# Patient Record
Sex: MALE | Race: WHITE | NOT HISPANIC OR LATINO | Employment: OTHER | URBAN - METROPOLITAN AREA
[De-identification: names, ages, dates, MRNs, and addresses within clinical notes are randomized per-mention and may not be internally consistent; named-entity substitution may affect disease eponyms.]

---

## 2017-03-09 PROBLEM — E78.2 MIXED HYPERLIPIDEMIA: Status: ACTIVE | Noted: 2017-03-09

## 2018-06-07 ENCOUNTER — TELEPHONE (OUTPATIENT)
Dept: ADMINISTRATIVE | Facility: HOSPITAL | Age: 58
End: 2018-06-07

## 2019-07-12 ENCOUNTER — PATIENT OUTREACH (OUTPATIENT)
Dept: ADMINISTRATIVE | Facility: HOSPITAL | Age: 59
End: 2019-07-12

## 2020-03-23 ENCOUNTER — HOSPITAL ENCOUNTER (EMERGENCY)
Facility: HOSPITAL | Age: 60
End: 2020-03-24
Attending: SURGERY
Payer: MEDICARE
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
## 2020-03-23 DIAGNOSIS — F29 PSYCHOSIS, UNSPECIFIED PSYCHOSIS TYPE: Primary | ICD-10-CM

## 2020-03-23 LAB
25(OH)D3+25(OH)D2 SERPL-MCNC: 13 NG/ML (ref 30–96)
ALBUMIN SERPL BCP-MCNC: 4.2 G/DL (ref 3.5–5.2)
ALP SERPL-CCNC: 135 U/L (ref 55–135)
ALT SERPL W/O P-5'-P-CCNC: 32 U/L (ref 10–44)
AMPHET+METHAMPHET UR QL: NEGATIVE
ANION GAP SERPL CALC-SCNC: 17 MMOL/L (ref 8–16)
APAP SERPL-MCNC: <3 UG/ML (ref 10–20)
AST SERPL-CCNC: 29 U/L (ref 10–40)
BARBITURATES UR QL SCN>200 NG/ML: NEGATIVE
BASOPHILS # BLD AUTO: ABNORMAL K/UL (ref 0–0.2)
BASOPHILS NFR BLD: 0 % (ref 0–1.9)
BENZODIAZ UR QL SCN>200 NG/ML: NEGATIVE
BILIRUB SERPL-MCNC: 0.5 MG/DL (ref 0.1–1)
BUN SERPL-MCNC: 22 MG/DL (ref 6–20)
BZE UR QL SCN: NEGATIVE
CALCIUM SERPL-MCNC: 9.6 MG/DL (ref 8.7–10.5)
CANNABINOIDS UR QL SCN: NEGATIVE
CHLORIDE SERPL-SCNC: 99 MMOL/L (ref 95–110)
CO2 SERPL-SCNC: 18 MMOL/L (ref 23–29)
CREAT SERPL-MCNC: 1.7 MG/DL (ref 0.5–1.4)
CREAT UR-MCNC: 133.1 MG/DL (ref 23–375)
DIFFERENTIAL METHOD: ABNORMAL
EOSINOPHIL # BLD AUTO: ABNORMAL K/UL (ref 0–0.5)
EOSINOPHIL NFR BLD: 0 % (ref 0–8)
ERYTHROCYTE [DISTWIDTH] IN BLOOD BY AUTOMATED COUNT: 12.9 % (ref 11.5–14.5)
EST. GFR  (AFRICAN AMERICAN): 50 ML/MIN/1.73 M^2
EST. GFR  (NON AFRICAN AMERICAN): 43 ML/MIN/1.73 M^2
ETHANOL SERPL-MCNC: <10 MG/DL
FOLATE SERPL-MCNC: 14.3 NG/ML (ref 4–24)
GLUCOSE SERPL-MCNC: 429 MG/DL (ref 70–110)
HCT VFR BLD AUTO: 39.9 % (ref 40–54)
HGB BLD-MCNC: 14 G/DL (ref 14–18)
IMM GRANULOCYTES # BLD AUTO: ABNORMAL K/UL (ref 0–0.04)
IMM GRANULOCYTES NFR BLD AUTO: ABNORMAL % (ref 0–0.5)
LYMPHOCYTES # BLD AUTO: ABNORMAL K/UL (ref 1–4.8)
LYMPHOCYTES NFR BLD: 22 % (ref 18–48)
MCH RBC QN AUTO: 30.8 PG (ref 27–31)
MCHC RBC AUTO-ENTMCNC: 35.1 G/DL (ref 32–36)
MCV RBC AUTO: 88 FL (ref 82–98)
METHADONE UR QL SCN>300 NG/ML: NEGATIVE
MONOCYTES # BLD AUTO: ABNORMAL K/UL (ref 0.3–1)
MONOCYTES NFR BLD: 10 % (ref 4–15)
NEUTROPHILS NFR BLD: 58 % (ref 38–73)
NEUTS BAND NFR BLD MANUAL: 10 %
NRBC BLD-RTO: 0 /100 WBC
OPIATES UR QL SCN: NEGATIVE
PCP UR QL SCN>25 NG/ML: NEGATIVE
PLATELET # BLD AUTO: 197 K/UL (ref 150–350)
PLATELET BLD QL SMEAR: ABNORMAL
PMV BLD AUTO: 10.5 FL (ref 9.2–12.9)
POCT GLUCOSE: 273 MG/DL (ref 70–110)
POCT GLUCOSE: 329 MG/DL (ref 70–110)
POCT GLUCOSE: 398 MG/DL (ref 70–110)
POCT GLUCOSE: 399 MG/DL (ref 70–110)
POTASSIUM SERPL-SCNC: 4.2 MMOL/L (ref 3.5–5.1)
PROT SERPL-MCNC: 8.2 G/DL (ref 6–8.4)
RBC # BLD AUTO: 4.54 M/UL (ref 4.6–6.2)
SALICYLATES SERPL-MCNC: <5 MG/DL (ref 15–30)
SODIUM SERPL-SCNC: 134 MMOL/L (ref 136–145)
T3FREE SERPL-MCNC: 3.7 PG/ML (ref 2.3–4.2)
T4 FREE SERPL-MCNC: 1.39 NG/DL (ref 0.71–1.51)
TOXICOLOGY INFORMATION: NORMAL
TSH SERPL DL<=0.005 MIU/L-ACNC: 7.48 UIU/ML (ref 0.4–4)
VIT B12 SERPL-MCNC: 339 PG/ML (ref 210–950)
WBC # BLD AUTO: 4.91 K/UL (ref 3.9–12.7)

## 2020-03-23 PROCEDURE — 84443 ASSAY THYROID STIM HORMONE: CPT

## 2020-03-23 PROCEDURE — 96361 HYDRATE IV INFUSION ADD-ON: CPT

## 2020-03-23 PROCEDURE — 80053 COMPREHEN METABOLIC PANEL: CPT | Mod: 91

## 2020-03-23 PROCEDURE — 63600175 PHARM REV CODE 636 W HCPCS: Performed by: SURGERY

## 2020-03-23 PROCEDURE — 82306 VITAMIN D 25 HYDROXY: CPT

## 2020-03-23 PROCEDURE — 80329 ANALGESICS NON-OPIOID 1 OR 2: CPT

## 2020-03-23 PROCEDURE — 84439 ASSAY OF FREE THYROXINE: CPT

## 2020-03-23 PROCEDURE — 80053 COMPREHEN METABOLIC PANEL: CPT

## 2020-03-23 PROCEDURE — 99285 EMERGENCY DEPT VISIT HI MDM: CPT | Mod: 25

## 2020-03-23 PROCEDURE — 63600175 PHARM REV CODE 636 W HCPCS: Performed by: EMERGENCY MEDICINE

## 2020-03-23 PROCEDURE — 80307 DRUG TEST PRSMV CHEM ANLYZR: CPT

## 2020-03-23 PROCEDURE — 82746 ASSAY OF FOLIC ACID SERUM: CPT

## 2020-03-23 PROCEDURE — 82607 VITAMIN B-12: CPT

## 2020-03-23 PROCEDURE — 82962 GLUCOSE BLOOD TEST: CPT | Mod: 91

## 2020-03-23 PROCEDURE — 84481 FREE ASSAY (FT-3): CPT

## 2020-03-23 PROCEDURE — 85027 COMPLETE CBC AUTOMATED: CPT

## 2020-03-23 PROCEDURE — 80320 DRUG SCREEN QUANTALCOHOLS: CPT

## 2020-03-23 PROCEDURE — 96372 THER/PROPH/DIAG INJ SC/IM: CPT | Mod: 59

## 2020-03-23 PROCEDURE — 85007 BL SMEAR W/DIFF WBC COUNT: CPT

## 2020-03-23 PROCEDURE — 96374 THER/PROPH/DIAG INJ IV PUSH: CPT

## 2020-03-23 PROCEDURE — 36415 COLL VENOUS BLD VENIPUNCTURE: CPT

## 2020-03-23 RX ORDER — LORAZEPAM 2 MG/ML
2 INJECTION INTRAMUSCULAR EVERY 4 HOURS PRN
Status: DISCONTINUED | OUTPATIENT
Start: 2020-03-23 | End: 2020-03-24 | Stop reason: HOSPADM

## 2020-03-23 RX ORDER — SODIUM CHLORIDE 9 MG/ML
1000 INJECTION, SOLUTION INTRAVENOUS
Status: COMPLETED | OUTPATIENT
Start: 2020-03-23 | End: 2020-03-23

## 2020-03-23 RX ORDER — INSULIN ASPART 100 [IU]/ML
6 INJECTION, SOLUTION INTRAVENOUS; SUBCUTANEOUS
Status: COMPLETED | OUTPATIENT
Start: 2020-03-23 | End: 2020-03-23

## 2020-03-23 RX ORDER — HALOPERIDOL 5 MG/ML
5 INJECTION INTRAMUSCULAR EVERY 4 HOURS PRN
Status: DISCONTINUED | OUTPATIENT
Start: 2020-03-23 | End: 2020-03-24 | Stop reason: HOSPADM

## 2020-03-23 RX ORDER — DIPHENHYDRAMINE HYDROCHLORIDE 50 MG/ML
50 INJECTION INTRAMUSCULAR; INTRAVENOUS EVERY 4 HOURS PRN
Status: DISCONTINUED | OUTPATIENT
Start: 2020-03-23 | End: 2020-03-24 | Stop reason: HOSPADM

## 2020-03-23 RX ORDER — INSULIN ASPART 100 [IU]/ML
6 INJECTION, SUSPENSION SUBCUTANEOUS
Status: DISCONTINUED | OUTPATIENT
Start: 2020-03-23 | End: 2020-03-23

## 2020-03-23 RX ADMIN — LORAZEPAM 2 MG: 2 INJECTION INTRAMUSCULAR; INTRAVENOUS at 04:03

## 2020-03-23 RX ADMIN — INSULIN ASPART 6 UNITS: 100 INJECTION, SOLUTION INTRAVENOUS; SUBCUTANEOUS at 05:03

## 2020-03-23 RX ADMIN — SODIUM CHLORIDE 1000 ML: 0.9 INJECTION, SOLUTION INTRAVENOUS at 10:03

## 2020-03-23 RX ADMIN — DIPHENHYDRAMINE HYDROCHLORIDE 50 MG: 50 INJECTION INTRAMUSCULAR; INTRAVENOUS at 04:03

## 2020-03-23 RX ADMIN — INSULIN HUMAN 4 UNITS: 100 INJECTION, SOLUTION PARENTERAL at 07:03

## 2020-03-23 RX ADMIN — HALOPERIDOL LACTATE 5 MG: 5 INJECTION, SOLUTION INTRAMUSCULAR at 04:03

## 2020-03-23 NOTE — ED TRIAGE NOTES
Patient presents to the ER via EMS for psychiatric evaluation.  Patient is seen by [facility].  Patient has history of bipolar and schizophrenia.  Patient is manic at time of arrival, shouting out random words.  It is reported by EMS that patient is having visual hallucinations, pointing to people who are not there.  Patient unable to answer any questions himself due to manic state.

## 2020-03-23 NOTE — ED PROVIDER NOTES
[facility]                                                 Chief Complaint  59 y.o. male with Psychiatric Evaluation    History of Present Illness  Chau Lovett presents to the emergency room with hallucinations  Patient has long history of bipolar disorder, last hospitalization was January  Patient is seeing things and hearing voices, family called 911 this afternoon  Patient initially was paranoid per EMS, has calmed down considerably since  Patient is nonviolent, not suicidal but obviously delusional and paranoid today    The history is provided by the patient   device was not used during this ER visit  Medical history:  Bipolar, bradycardia, diabetes, HTN, nuclear sclerosis  Surgeries: Cholecystectomy and pacemaker  No known allergies    I have reviewed all of this patient's past medical, surgical, family, and social   histories as well as active allergies and medications documented in the  electronic medical record    Review of Systems and Physical Exam      Review of Systems  -- Constitution - no fever, denies fatigue, no weakness, no chills  -- Eyes - no tearing or redness, no visual disturbance  -- Ear, Nose - no tinnitus or earache, no nasal congestion or discharge  -- Mouth,Throat - no sore throat, no toothache, normal voice, normal swallowing  -- Respiratory - denies cough and congestion, no shortness of breath, no REES  -- Cardiovascular - denies chest pain, no palpitations, denies claudication  -- Gastrointestinal - denies abdominal pain, nausea, vomiting, or diarrhea  -- Genitourinary - no dysuria, denies flank pain, no hematuria, no STD risk  -- Musculoskeletal - denies back pain, negative for trauma or injury   -- Neurological - no headache, denies weakness or seizure; no LOC  -- Skin - denies pallor, rash, or changes in skin. no hives or welts noted  -- Psychiatric - psychosis with fracture thought process, not suicidal    Physical Exam  -- Nursing  note and vitals reviewed  -- Constitutional: Appears well-developed and well-nourished  -- Head: Atraumatic. Normocephalic. No obvious abnormality  -- Eyes: Pupils are equal and reactive to light. Normal conjunctiva and lids  -- Cardiac: Normal rate, regular rhythm and normal heart sounds  -- Pulmonary: Normal respiratory effort, breath sounds clear to auscultation  -- Abdominal: Soft, no tenderness. Normal bowel sounds. Normal liver edge  -- Musculoskeletal: Normal range of motion, no effusions. Joints stable   -- Neurological: No focal deficits. Showed good interaction with staff  -- Vascular: Posterior tibial, dorsalis pedis and radial pulses 2+ bilaterally    -- Lymphatics: No cervical or peripheral lymphadenopathy. No edema noted  -- Skin: Warm and dry. No evidence of rash or cellulitis    Emergency Room Course      Diagnosis  -- The encounter diagnosis was Psychosis, unspecified psychosis type.    Disposition and Plan  -- Disposition: PEC  -- Condition: stable  -- Pt will be placed in a psychiatric facility  -- The patient is a direct observation until placement  -- The patient has been made aware of his or her rights while under [facility]EC in the ER  -- All questions have been answered; will follow ER protocols until placement    Lab work was performed in the ER, cleared for psychiatric placement     This note is dictated on Dragon Natural Speaking word recognition program.  There are word recognition mistakes that are occasionally missed on review.          Kei Bonds MD  03/23/20 4232

## 2020-03-23 NOTE — ED NOTES
Pt occupied to bathroom to void and give urine sample and change in blue paper scrubs.  Pt co-operated well.

## 2020-03-24 ENCOUNTER — HOSPITAL ENCOUNTER (INPATIENT)
Facility: HOSPITAL | Age: 60
LOS: 15 days | Discharge: HOME | DRG: 885 | End: 2020-04-08
Attending: PSYCHIATRY & NEUROLOGY | Admitting: PSYCHIATRY & NEUROLOGY
Payer: MEDICARE

## 2020-03-24 VITALS
OXYGEN SATURATION: 100 % | TEMPERATURE: 97 F | SYSTOLIC BLOOD PRESSURE: 128 MMHG | HEIGHT: 70 IN | WEIGHT: 200 LBS | HEART RATE: 84 BPM | DIASTOLIC BLOOD PRESSURE: 62 MMHG | BODY MASS INDEX: 28.63 KG/M2 | RESPIRATION RATE: 18 BRPM

## 2020-03-24 DIAGNOSIS — N28.9 ACUTE RENAL INSUFFICIENCY: ICD-10-CM

## 2020-03-24 DIAGNOSIS — E78.2 MIXED HYPERLIPIDEMIA: ICD-10-CM

## 2020-03-24 DIAGNOSIS — F31.2 BIPOLAR AFFECTIVE DISORDER, MANIC, SEVERE, WITH PSYCHOTIC BEHAVIOR: ICD-10-CM

## 2020-03-24 DIAGNOSIS — E11.65 UNCONTROLLED TYPE 2 DIABETES MELLITUS WITH HYPERGLYCEMIA, WITHOUT LONG-TERM CURRENT USE OF INSULIN: ICD-10-CM

## 2020-03-24 DIAGNOSIS — R79.89 ABNORMAL TSH: ICD-10-CM

## 2020-03-24 DIAGNOSIS — R00.1 BRADYCARDIA: ICD-10-CM

## 2020-03-24 DIAGNOSIS — I10 ESSENTIAL HYPERTENSION: ICD-10-CM

## 2020-03-24 DIAGNOSIS — F31.2 BIPOLAR I DISORDER, CURRENT OR MOST RECENT EPISODE MANIC, SEVERE WITH MOOD-CONGRUENT PSYCHOTIC FEATURES: Primary | ICD-10-CM

## 2020-03-24 DIAGNOSIS — E87.1 HYPONATREMIA: ICD-10-CM

## 2020-03-24 LAB
ALBUMIN SERPL BCP-MCNC: 3.5 G/DL (ref 3.5–5.2)
ALP SERPL-CCNC: 97 U/L (ref 55–135)
ALT SERPL W/O P-5'-P-CCNC: 28 U/L (ref 10–44)
ANION GAP SERPL CALC-SCNC: 10 MMOL/L (ref 8–16)
ANION GAP SERPL CALC-SCNC: 11 MMOL/L (ref 8–16)
AST SERPL-CCNC: 34 U/L (ref 10–40)
BILIRUB SERPL-MCNC: 0.1 MG/DL (ref 0.1–1)
BUN SERPL-MCNC: 24 MG/DL (ref 6–20)
BUN SERPL-MCNC: 26 MG/DL (ref 6–20)
CALCIUM SERPL-MCNC: 8 MG/DL (ref 8.7–10.5)
CALCIUM SERPL-MCNC: 8.4 MG/DL (ref 8.7–10.5)
CHLORIDE SERPL-SCNC: 98 MMOL/L (ref 95–110)
CHLORIDE SERPL-SCNC: 99 MMOL/L (ref 95–110)
CO2 SERPL-SCNC: 22 MMOL/L (ref 23–29)
CO2 SERPL-SCNC: 24 MMOL/L (ref 23–29)
CREAT SERPL-MCNC: 1.3 MG/DL (ref 0.5–1.4)
CREAT SERPL-MCNC: 1.4 MG/DL (ref 0.5–1.4)
EST. GFR  (AFRICAN AMERICAN): >60 ML/MIN/1.73 M^2
EST. GFR  (AFRICAN AMERICAN): >60 ML/MIN/1.73 M^2
EST. GFR  (NON AFRICAN AMERICAN): 55 ML/MIN/1.73 M^2
EST. GFR  (NON AFRICAN AMERICAN): 60 ML/MIN/1.73 M^2
ESTIMATED AVG GLUCOSE: 295 MG/DL (ref 68–131)
GLUCOSE SERPL-MCNC: 255 MG/DL (ref 70–110)
GLUCOSE SERPL-MCNC: 262 MG/DL (ref 70–110)
HBA1C MFR BLD HPLC: 11.9 % (ref 4–5.6)
POCT GLUCOSE: 219 MG/DL (ref 70–110)
POCT GLUCOSE: 248 MG/DL (ref 70–110)
POCT GLUCOSE: 267 MG/DL (ref 70–110)
POCT GLUCOSE: 267 MG/DL (ref 70–110)
POTASSIUM SERPL-SCNC: 3.8 MMOL/L (ref 3.5–5.1)
POTASSIUM SERPL-SCNC: 4.6 MMOL/L (ref 3.5–5.1)
PROT SERPL-MCNC: 6.7 G/DL (ref 6–8.4)
SODIUM SERPL-SCNC: 132 MMOL/L (ref 136–145)
SODIUM SERPL-SCNC: 132 MMOL/L (ref 136–145)

## 2020-03-24 PROCEDURE — 83036 HEMOGLOBIN GLYCOSYLATED A1C: CPT

## 2020-03-24 PROCEDURE — 90833 PR PSYCHOTHERAPY W/PATIENT W/E&M, 30 MIN (ADD ON): ICD-10-PCS | Mod: S$PBB,,, | Performed by: PSYCHIATRY & NEUROLOGY

## 2020-03-24 PROCEDURE — 90833 PSYTX W PT W E/M 30 MIN: CPT | Mod: S$PBB,,, | Performed by: PSYCHIATRY & NEUROLOGY

## 2020-03-24 PROCEDURE — 80048 BASIC METABOLIC PNL TOTAL CA: CPT

## 2020-03-24 PROCEDURE — 63600175 PHARM REV CODE 636 W HCPCS: Performed by: PSYCHIATRY & NEUROLOGY

## 2020-03-24 PROCEDURE — 99222 1ST HOSP IP/OBS MODERATE 55: CPT | Mod: ,,, | Performed by: NURSE PRACTITIONER

## 2020-03-24 PROCEDURE — 11400000 HC PSYCH PRIVATE ROOM

## 2020-03-24 PROCEDURE — 25000003 PHARM REV CODE 250: Performed by: PSYCHIATRY & NEUROLOGY

## 2020-03-24 PROCEDURE — 99222 PR INITIAL HOSPITAL CARE,LEVL II: ICD-10-PCS | Mod: ,,, | Performed by: NURSE PRACTITIONER

## 2020-03-24 PROCEDURE — 99223 PR INITIAL HOSPITAL CARE,LEVL III: ICD-10-PCS | Mod: AI,S$PBB,, | Performed by: PSYCHIATRY & NEUROLOGY

## 2020-03-24 PROCEDURE — 36415 COLL VENOUS BLD VENIPUNCTURE: CPT

## 2020-03-24 PROCEDURE — 25000003 PHARM REV CODE 250: Performed by: NURSE PRACTITIONER

## 2020-03-24 PROCEDURE — 99223 1ST HOSP IP/OBS HIGH 75: CPT | Mod: AI,S$PBB,, | Performed by: PSYCHIATRY & NEUROLOGY

## 2020-03-24 RX ORDER — FOLIC ACID 1 MG/1
1 TABLET ORAL DAILY
Status: DISCONTINUED | OUTPATIENT
Start: 2020-03-24 | End: 2020-03-25

## 2020-03-24 RX ORDER — CARBAMAZEPINE 100 MG/1
100 TABLET, EXTENDED RELEASE ORAL 2 TIMES DAILY
Status: DISCONTINUED | OUTPATIENT
Start: 2020-03-24 | End: 2020-03-26

## 2020-03-24 RX ORDER — IBUPROFEN 200 MG
24 TABLET ORAL
Status: DISCONTINUED | OUTPATIENT
Start: 2020-03-24 | End: 2020-04-08 | Stop reason: HOSPADM

## 2020-03-24 RX ORDER — ASPIRIN 81 MG/1
81 TABLET ORAL DAILY
Status: DISCONTINUED | OUTPATIENT
Start: 2020-03-24 | End: 2020-04-08 | Stop reason: HOSPADM

## 2020-03-24 RX ORDER — LOPERAMIDE HYDROCHLORIDE 2 MG/1
2 CAPSULE ORAL
Status: DISCONTINUED | OUTPATIENT
Start: 2020-03-24 | End: 2020-04-08 | Stop reason: HOSPADM

## 2020-03-24 RX ORDER — METFORMIN HYDROCHLORIDE 500 MG/1
1000 TABLET ORAL 2 TIMES DAILY WITH MEALS
Status: DISCONTINUED | OUTPATIENT
Start: 2020-03-24 | End: 2020-04-08 | Stop reason: HOSPADM

## 2020-03-24 RX ORDER — OLANZAPINE 10 MG/1
10 TABLET ORAL EVERY 4 HOURS PRN
Status: DISCONTINUED | OUTPATIENT
Start: 2020-03-24 | End: 2020-04-08 | Stop reason: HOSPADM

## 2020-03-24 RX ORDER — HYDROXYZINE PAMOATE 50 MG/1
50 CAPSULE ORAL EVERY 6 HOURS PRN
Status: DISCONTINUED | OUTPATIENT
Start: 2020-03-24 | End: 2020-04-08 | Stop reason: HOSPADM

## 2020-03-24 RX ORDER — GLUCAGON 1 MG
1 KIT INJECTION
Status: DISCONTINUED | OUTPATIENT
Start: 2020-03-24 | End: 2020-04-08 | Stop reason: HOSPADM

## 2020-03-24 RX ORDER — DOCUSATE SODIUM 100 MG/1
100 CAPSULE, LIQUID FILLED ORAL DAILY PRN
Status: DISCONTINUED | OUTPATIENT
Start: 2020-03-24 | End: 2020-04-08 | Stop reason: HOSPADM

## 2020-03-24 RX ORDER — ACETAMINOPHEN 325 MG/1
650 TABLET ORAL EVERY 6 HOURS PRN
Status: DISCONTINUED | OUTPATIENT
Start: 2020-03-24 | End: 2020-04-08 | Stop reason: HOSPADM

## 2020-03-24 RX ORDER — SIMVASTATIN 10 MG/1
20 TABLET, FILM COATED ORAL NIGHTLY
Status: DISCONTINUED | OUTPATIENT
Start: 2020-03-24 | End: 2020-04-08 | Stop reason: HOSPADM

## 2020-03-24 RX ORDER — MAG HYDROX/ALUMINUM HYD/SIMETH 200-200-20
30 SUSPENSION, ORAL (FINAL DOSE FORM) ORAL EVERY 6 HOURS PRN
Status: DISCONTINUED | OUTPATIENT
Start: 2020-03-24 | End: 2020-04-08 | Stop reason: HOSPADM

## 2020-03-24 RX ORDER — IBUPROFEN 200 MG
16 TABLET ORAL
Status: DISCONTINUED | OUTPATIENT
Start: 2020-03-24 | End: 2020-04-08 | Stop reason: HOSPADM

## 2020-03-24 RX ORDER — OLANZAPINE 5 MG/1
5 TABLET ORAL NIGHTLY
Status: DISCONTINUED | OUTPATIENT
Start: 2020-03-24 | End: 2020-03-25

## 2020-03-24 RX ORDER — OLANZAPINE 10 MG/2ML
10 INJECTION, POWDER, FOR SOLUTION INTRAMUSCULAR EVERY 4 HOURS PRN
Status: DISCONTINUED | OUTPATIENT
Start: 2020-03-24 | End: 2020-04-08 | Stop reason: HOSPADM

## 2020-03-24 RX ORDER — SAXAGLIPTIN 2.5 MG/1
5 TABLET, FILM COATED ORAL DAILY
Status: DISCONTINUED | OUTPATIENT
Start: 2020-03-24 | End: 2020-04-08 | Stop reason: HOSPADM

## 2020-03-24 RX ORDER — INSULIN ASPART 100 [IU]/ML
0-5 INJECTION, SOLUTION INTRAVENOUS; SUBCUTANEOUS
Status: DISCONTINUED | OUTPATIENT
Start: 2020-03-24 | End: 2020-04-08 | Stop reason: HOSPADM

## 2020-03-24 RX ADMIN — SAXAGLIPTIN 5 MG: 2.5 TABLET, FILM COATED ORAL at 08:03

## 2020-03-24 RX ADMIN — INSULIN ASPART 3 UNITS: 100 INJECTION, SOLUTION INTRAVENOUS; SUBCUTANEOUS at 12:03

## 2020-03-24 RX ADMIN — ASPIRIN 81 MG: 81 TABLET, COATED ORAL at 08:03

## 2020-03-24 RX ADMIN — CARBAMAZEPINE 100 MG: 100 TABLET, EXTENDED RELEASE ORAL at 08:03

## 2020-03-24 RX ADMIN — FOLIC ACID 1 MG: 1 TABLET ORAL at 08:03

## 2020-03-24 RX ADMIN — THERA TABS 1 TABLET: TAB at 08:03

## 2020-03-24 RX ADMIN — METFORMIN HYDROCHLORIDE 1000 MG: 500 TABLET ORAL at 08:03

## 2020-03-24 RX ADMIN — SIMVASTATIN 20 MG: 10 TABLET, FILM COATED ORAL at 08:03

## 2020-03-24 RX ADMIN — OLANZAPINE 5 MG: 5 TABLET, FILM COATED ORAL at 08:03

## 2020-03-24 RX ADMIN — INSULIN ASPART 3 UNITS: 100 INJECTION, SOLUTION INTRAVENOUS; SUBCUTANEOUS at 04:03

## 2020-03-24 RX ADMIN — INSULIN ASPART 1 UNITS: 100 INJECTION, SOLUTION INTRAVENOUS; SUBCUTANEOUS at 08:03

## 2020-03-24 RX ADMIN — METFORMIN HYDROCHLORIDE 1000 MG: 500 TABLET ORAL at 04:03

## 2020-03-24 RX ADMIN — CARBAMAZEPINE 100 MG: 100 TABLET, EXTENDED RELEASE ORAL at 10:03

## 2020-03-24 NOTE — PLAN OF CARE
Pt awake in assigned bed at this time.  Pt awakened by disruptive behavior of female peer.  Pt slept 2 hours.  Pathways clear and bed is low.  Q 15 minute safety checks ongoing.  All precautions maintained.

## 2020-03-24 NOTE — ASSESSMENT & PLAN NOTE
"Should be on ace/arb due to DM diagnosis. Has lisinopril 5mg on med list. Bp running on low side and patient reports that he was on something for bp but was taken off because "it leveled out". I will not start Ace/arb inpatient unless bp rises. Should f/u oupt to have this added to medications for renal protection     "

## 2020-03-24 NOTE — PLAN OF CARE
Problem: Adult Behavioral Health Plan of Care  Goal: Optimized Coping Skills in Response to Life Stressors  Intervention: Promote Effective Coping Strategies  Flowsheets (Taken 3/24/2020 1513)  Supportive Measures: active listening utilized; counseling provided; positive reinforcement provided; verbalization of feelings encouraged; problem solving facilitated; decision-making supported; relaxation techniques promoted; goal setting facilitated; self-care encouraged; self-reflection promoted; self-responsibility promoted; journaling promoted     Behavior: Patient attended psychotherapeutic group dressed in hospital scrubs, appropriate grooming with congruent affect and elevated mood, hypervigilant posture, and persistent eye contact. Patient remained engaged and attentive.    Intervention:  will engage patients in a CBT based process group focused on the cycle of anxiety: anxiety, avoidance, temporary relief, long term anxiety. This will allow patients to be introspective about their coping skills when they are anxious, as well as identify the behaviors they are exhibiting when anxious. Patients will be encouraged to express concerns and goals for treatment and discharge, as well as perceived barriers to progress.  will aid patients in prioritizing and making realistic goals, identifying strengths that will aid in transition, and verbalizing emotions.     Response: Patient remained attentive and engaged in conversation. He is grandiose and repeats that he never gives up. He has minimal insight at this time. He refers to himself as a perfectionist. He was smiling throughout group.    Plan:  will continue to encourage patient to explore and verbalize emotions, set goals to aid in preventing re-hospitalization, attend psychotherapeutic group, and follow up with aftercare appointments.
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
## 2020-03-24 NOTE — PROGRESS NOTES
"   03/24/20 1040   [facility] Group Therapy   Group Name Therapeutic Recreation   Specific Interventions Cognitive Stimulation Training   Participation Level Appropriate   Participation Quality Cooperative   Insight/Motivation Good   Affect/Mood Display Appropriate   Cognition Alert   Psychomotor WNL   Patient reports "good" mood. Patient states "here I get total relaxation, at home it's to much pressure, people telling me what to do when I think it's wrong. When I'm high I think I can touch the jose guadalupe. It's my bipolar." Patient attention seeking, talking while others talked, smiling and staring.  "

## 2020-03-24 NOTE — ASSESSMENT & PLAN NOTE
Amaryl, metformin and trulicity on home med list. A1C pending. Bs 255 this am.   Start DM diet. SSI as well as resume metformin while here hold amaryl

## 2020-03-24 NOTE — SUBJECTIVE & OBJECTIVE
Past Medical History:   Diagnosis Date    Bipolar 1 disorder     Bradycardia     Diabetes mellitus     History of psychiatric hospitalization     Hx of psychiatric care     Hypertension     Jacinta     Nuclear sclerosis of both eyes 10/22/2015    Psychiatric problem     Therapy        Past Surgical History:   Procedure Laterality Date    GALLBLADDER SURGERY      INSERT / REPLACE / REMOVE PACEMAKER         Review of patient's allergies indicates:  No Known Allergies    Current Facility-Administered Medications on File Prior to Encounter   Medication    [COMPLETED] 0.9%  NaCl infusion    [COMPLETED] insulin aspart U-100 pen 6 Units    [COMPLETED] insulin regular injection 4 Units    [DISCONTINUED] diphenhydrAMINE injection 50 mg    [DISCONTINUED] haloperidol lactate injection 5 mg    [DISCONTINUED] insulin aspart protamine-insulin aspart (NovoLOG 70/30) injection    [DISCONTINUED] lorazepam injection 2 mg     Current Outpatient Medications on File Prior to Encounter   Medication Sig    blood sugar diagnostic (BLOOD GLUCOSE TEST) Strp 1 strip once daily.    cholecalciferol, vitamin D3, (VITAMIN D3) 2,000 unit Cap Take 1 capsule by mouth once daily.    metFORMIN (GLUCOPHAGE) 1000 MG tablet Take 1 tablet (1,000 mg total) by mouth 2 (two) times daily with meals.    SAXagliptin (ONGLYZA) 5 mg Tab tablet Take 1 tablet (5 mg total) by mouth once daily.    aspirin (ECOTRIN) 81 MG EC tablet Take 81 mg by mouth once daily.    diphenhydrAMINE (BENADRYL) 25 mg capsule Take 25 mg by mouth every evening. Take three tablets (75mg total) nightly    divalproex (DEPAKOTE ER) 500 MG Tb24 Take 500 mg by mouth nightly.     fish oil-omega-3 fatty acids 300-1,000 mg capsule Take 2 g by mouth once daily.    glimepiride (AMARYL) 2 MG tablet Take 1 tablet (2 mg total) by mouth 2 (two) times daily.    lancets (TRUEPLUS LANCETS) 28 gauge Misc 1 lancet by Misc.(Non-Drug; Combo Route) route 3 (three) times daily.     lisinopril (PRINIVIL,ZESTRIL) 5 MG tablet TAKE 1 TABLET BY MOUTH EVERY DAY    multivitamin with minerals tablet Take 1 tablet by mouth once daily.    risperidone (RISPERDAL) 3 MG Tab Take 4 mg by mouth nightly.     simvastatin (ZOCOR) 20 MG tablet Take 1 tablet (20 mg total) by mouth every evening.    TRUEPLUS LANCETS 28 gauge Misc USE AS DIRECTED     Family History     Problem Relation (Age of Onset)    Bipolar disorder Sister    Heart attack Mother, Father        Tobacco Use    Smoking status: Never Smoker    Smokeless tobacco: Never Used   Substance and Sexual Activity    Alcohol use: No     Alcohol/week: 0.0 standard drinks    Drug use: No    Sexual activity: Never     Review of Systems   Constitutional: Negative for chills and fever.   Respiratory: Negative for chest tightness and shortness of breath.    Cardiovascular: Negative for chest pain and palpitations.   Gastrointestinal: Negative for abdominal distention, abdominal pain, blood in stool and vomiting.   Genitourinary: Negative for dysuria, flank pain, hematuria and urgency.   Musculoskeletal: Negative for back pain and neck pain.   Skin: Negative for rash and wound.   Neurological: Negative for dizziness, weakness and numbness.   Hematological: Does not bruise/bleed easily.   Psychiatric/Behavioral: Positive for self-injury. Negative for agitation and suicidal ideas. The patient is not nervous/anxious.      Objective:     Vital Signs (Most Recent):  Temp: 96.9 °F (36.1 °C) (03/24/20 0045)  Pulse: 94 (03/24/20 0045)  Resp: 18 (03/24/20 0045)  BP: 130/68 (03/24/20 0045) Vital Signs (24h Range):  Temp:  [96.4 °F (35.8 °C)-97.6 °F (36.4 °C)] 96.9 °F (36.1 °C)  Pulse:  [] 94  Resp:  [16-20] 18  SpO2:  [98 %-100 %] 100 %  BP: (114-134)/(58-83) 130/68     Weight: 92 kg (202 lb 11.4 oz)  Body mass index is 29.09 kg/m².    Physical Exam   Constitutional: He is oriented to person, place, and time. He appears well-developed and well-nourished.    HENT:   Head: Normocephalic and atraumatic.   Neck: Normal range of motion. Neck supple. No thyromegaly present.   Cardiovascular: Normal rate, regular rhythm, normal heart sounds and intact distal pulses.   No murmur heard.  Pulmonary/Chest: Effort normal and breath sounds normal. No respiratory distress. He has no wheezes. He has no rales.   Abdominal: Soft. Bowel sounds are normal. He exhibits no distension. There is no tenderness.   Musculoskeletal: Normal range of motion. He exhibits no edema or deformity.   Neurological: He is alert and oriented to person, place, and time.   Neuro: Cranial nerves:  CN II Visual fields full to confrontation.   CN III, IV, VI Pupils are equal, round, and reactive to light.  CN III: no palsy  Nystagmus: none   Diplopia: none  Ophthalmoparesis: none  CN V Facial sensation intact.   CN VII Facial expression full, symmetric.   CN VIII normal.   CN IX normal.   CN X normal.   CN XI normal.   CN XII normal.     Skin: Skin is warm and dry.   Very superficial scrapes scabbed to right knee. No s/s infection    No bruising to right rib cage, just tender to palp   Psychiatric: He has a normal mood and affect. His behavior is normal. Thought content normal.   Nursing note and vitals reviewed.      Significant Labs:     U/A- ordered  UDS  Results for orders placed or performed during the hospital encounter of 03/23/20   Drug screen panel, emergency   Result Value Ref Range    Benzodiazepines Negative     Methadone metabolites Negative     Cocaine (Metab.) Negative     Opiate Scrn, Ur Negative     Barbiturate Screen, Ur Negative     Amphetamine Screen, Ur Negative     THC Negative     Phencyclidine Negative     Creatinine, Random Ur 133.1 23.0 - 375.0 mg/dL    Toxicology Information SEE COMMENT      CBC  Results for orders placed or performed during the hospital encounter of 03/23/20   CBC auto differential   Result Value Ref Range    WBC 4.91 3.90 - 12.70 K/uL    RBC 4.54 (L) 4.60 - 6.20  M/uL    Hemoglobin 14.0 14.0 - 18.0 g/dL    Hematocrit 39.9 (L) 40.0 - 54.0 %    Mean Corpuscular Volume 88 82 - 98 fL    Mean Corpuscular Hemoglobin 30.8 27.0 - 31.0 pg    Mean Corpuscular Hemoglobin Conc 35.1 32.0 - 36.0 g/dL    RDW 12.9 11.5 - 14.5 %    Platelets 197 150 - 350 K/uL    MPV 10.5 9.2 - 12.9 fL    Immature Granulocytes CANCELED 0.0 - 0.5 %    Immature Grans (Abs) CANCELED 0.00 - 0.04 K/uL    Lymph # CANCELED 1.0 - 4.8 K/uL    Mono # CANCELED 0.3 - 1.0 K/uL    Eos # CANCELED 0.0 - 0.5 K/uL    Baso # CANCELED 0.00 - 0.20 K/uL    nRBC 0 0 /100 WBC    Gran% 58.0 38.0 - 73.0 %    Lymph% 22.0 18.0 - 48.0 %    Mono% 10.0 4.0 - 15.0 %    Eosinophil% 0.0 0.0 - 8.0 %    Basophil% 0.0 0.0 - 1.9 %    Bands 10.0 %    Platelet Estimate Appears normal     Differential Method Manual      CMP  Results for orders placed or performed during the hospital encounter of 03/23/20   Comprehensive metabolic panel   Result Value Ref Range    Sodium 132 (L) 136 - 145 mmol/L    Potassium 3.8 3.5 - 5.1 mmol/L    Chloride 99 95 - 110 mmol/L    CO2 22 (L) 23 - 29 mmol/L    Glucose 262 (H) 70 - 110 mg/dL    BUN, Bld 26 (H) 6 - 20 mg/dL    Creatinine 1.4 0.5 - 1.4 mg/dL    Calcium 8.0 (L) 8.7 - 10.5 mg/dL    Total Protein 6.7 6.0 - 8.4 g/dL    Albumin 3.5 3.5 - 5.2 g/dL    Total Bilirubin 0.1 0.1 - 1.0 mg/dL    Alkaline Phosphatase 97 55 - 135 U/L    AST 34 10 - 40 U/L    ALT 28 10 - 44 U/L    Anion Gap 11 8 - 16 mmol/L    eGFR if African American >60 >60 mL/min/1.73 m^2    eGFR if non African American 55 (A) >60 mL/min/1.73 m^2     TSH  Results for orders placed or performed during the hospital encounter of 03/23/20   TSH   Result Value Ref Range    TSH 7.479 (H) 0.400 - 4.000 uIU/mL   free t4 1.39  ETOH  Results for orders placed or performed during the hospital encounter of 03/23/20   Ethanol   Result Value Ref Range    Alcohol, Medical, Serum <10 <10 mg/dL     Salicylate  Results for orders placed or performed during the hospital  encounter of 03/23/20   Salicylate level   Result Value Ref Range    Salicylate Lvl <5.0 (L) 15.0 - 30.0 mg/dL     Acetaminophen  Results for orders placed or performed during the hospital encounter of 03/23/20   Acetaminophen level   Result Value Ref Range    Acetaminophen (Tylenol), Serum <3.0 (L) 10.0 - 20.0 ug/mL

## 2020-03-24 NOTE — PLAN OF CARE
Recommendation:  1. Continue Diabetic diet as ordered;  2. encourage pt to increase intake of meals    Goals: increase intake of meals to 75% by f/u  Nutrition Goal Status: new  Nutrition Discharge Planning: Diabetic/low sodium

## 2020-03-24 NOTE — CONSULTS
[facility] Hospital Medicine  Consult Note    Patient Name: Chau Lovett  MRN: [id number]  Admission Date: 3/24/2020  Hospital Length of Stay: 0 days  Attending Physician: Bao Naranjo MD   Primary Care Provider: Primary Doctor No           Patient information was obtained from patient and ER records.     Inpatient consult to [facility] for History and Physical  Consult performed by: Tiarra Ahn NP  Consult ordered by: Bao Naranjo MD        Subjective:     Principal Problem: <principal problem not specified>    Chief Complaint: No chief complaint on file.       HPI: 60 yo male patient that presented to ER with EMS delusional. Hx of bipolar. He was admitted to [facility] and medicine was consulted for H/p. He also has hx of DM, HTN and bradycardia. He report sthat his only complaint is bruised ribs from recent trip and fall. Xray without rib fractures from ER. No SOB or difficulty breathing. % on RA. Other VSS/labs reviewed.     Past Medical History:   Diagnosis Date    Bipolar 1 disorder     Bradycardia     Diabetes mellitus     History of psychiatric hospitalization     Hx of psychiatric care     Hypertension     Jacinta     Nuclear sclerosis of both eyes 10/22/2015    Psychiatric problem     Therapy        Past Surgical History:   Procedure Laterality Date    GALLBLADDER SURGERY      INSERT / REPLACE / REMOVE PACEMAKER         Review of patient's allergies indicates:  No Known Allergies    Current Facility-Administered Medications on File Prior to Encounter   Medication    [COMPLETED] 0.9%  NaCl infusion    [COMPLETED] insulin aspart U-100 pen 6 Units    [COMPLETED] insulin regular injection 4 Units    [DISCONTINUED] diphenhydrAMINE injection 50 mg    [DISCONTINUED] haloperidol lactate injection 5 mg    [DISCONTINUED] insulin aspart protamine-insulin aspart (NovoLOG 70/30) injection    [DISCONTINUED] lorazepam injection 2 mg     Current  Outpatient Medications on File Prior to Encounter   Medication Sig    blood sugar diagnostic (BLOOD GLUCOSE TEST) Strp 1 strip once daily.    cholecalciferol, vitamin D3, (VITAMIN D3) 2,000 unit Cap Take 1 capsule by mouth once daily.    metFORMIN (GLUCOPHAGE) 1000 MG tablet Take 1 tablet (1,000 mg total) by mouth 2 (two) times daily with meals.    SAXagliptin (ONGLYZA) 5 mg Tab tablet Take 1 tablet (5 mg total) by mouth once daily.    aspirin (ECOTRIN) 81 MG EC tablet Take 81 mg by mouth once daily.    diphenhydrAMINE (BENADRYL) 25 mg capsule Take 25 mg by mouth every evening. Take three tablets (75mg total) nightly    divalproex (DEPAKOTE ER) 500 MG Tb24 Take 500 mg by mouth nightly.     fish oil-omega-3 fatty acids 300-1,000 mg capsule Take 2 g by mouth once daily.    glimepiride (AMARYL) 2 MG tablet Take 1 tablet (2 mg total) by mouth 2 (two) times daily.    lancets (TRUEPLUS LANCETS) 28 gauge Misc 1 lancet by Misc.(Non-Drug; Combo Route) route 3 (three) times daily.    lisinopril (PRINIVIL,ZESTRIL) 5 MG tablet TAKE 1 TABLET BY MOUTH EVERY DAY    multivitamin with minerals tablet Take 1 tablet by mouth once daily.    risperidone (RISPERDAL) 3 MG Tab Take 4 mg by mouth nightly.     simvastatin (ZOCOR) 20 MG tablet Take 1 tablet (20 mg total) by mouth every evening.    TRUEPLUS LANCETS 28 gauge Misc USE AS DIRECTED     Family History     Problem Relation (Age of Onset)    Bipolar disorder Sister    Heart attack Mother, Father        Tobacco Use    Smoking status: Never Smoker    Smokeless tobacco: Never Used   Substance and Sexual Activity    Alcohol use: No     Alcohol/week: 0.0 standard drinks    Drug use: No    Sexual activity: Never     Review of Systems   Constitutional: Negative for chills and fever.   Respiratory: Negative for chest tightness and shortness of breath.    Cardiovascular: Negative for chest pain and palpitations.   Gastrointestinal: Negative for abdominal distention,  abdominal pain, blood in stool and vomiting.   Genitourinary: Negative for dysuria, flank pain, hematuria and urgency.   Musculoskeletal: Negative for back pain and neck pain.   Skin: Negative for rash and wound.   Neurological: Negative for dizziness, weakness and numbness.   Hematological: Does not bruise/bleed easily.   Psychiatric/Behavioral: Positive for self-injury. Negative for agitation and suicidal ideas. The patient is not nervous/anxious.      Objective:     Vital Signs (Most Recent):  Temp: 96.9 °F (36.1 °C) (03/24/20 0045)  Pulse: 94 (03/24/20 0045)  Resp: 18 (03/24/20 0045)  BP: 130/68 (03/24/20 0045) Vital Signs (24h Range):  Temp:  [96.4 °F (35.8 °C)-97.6 °F (36.4 °C)] 96.9 °F (36.1 °C)  Pulse:  [] 94  Resp:  [16-20] 18  SpO2:  [98 %-100 %] 100 %  BP: (114-134)/(58-83) 130/68     Weight: 92 kg (202 lb 11.4 oz)  Body mass index is 29.09 kg/m².    Physical Exam   Constitutional: He is oriented to person, place, and time. He appears well-developed and well-nourished.   HENT:   Head: Normocephalic and atraumatic.   Neck: Normal range of motion. Neck supple. No thyromegaly present.   Cardiovascular: Normal rate, regular rhythm, normal heart sounds and intact distal pulses.   No murmur heard.  Pulmonary/Chest: Effort normal and breath sounds normal. No respiratory distress. He has no wheezes. He has no rales.   Abdominal: Soft. Bowel sounds are normal. He exhibits no distension. There is no tenderness.   Musculoskeletal: Normal range of motion. He exhibits no edema or deformity.   Neurological: He is alert and oriented to person, place, and time.   Neuro: Cranial nerves:  CN II Visual fields full to confrontation.   CN III, IV, VI Pupils are equal, round, and reactive to light.  CN III: no palsy  Nystagmus: none   Diplopia: none  Ophthalmoparesis: none  CN V Facial sensation intact.   CN VII Facial expression full, symmetric.   CN VIII normal.   CN IX normal.   CN X normal.   CN XI normal.   CN XII  normal.     Skin: Skin is warm and dry.   Very superficial scrapes scabbed to right knee. No s/s infection    No bruising to right rib cage, just tender to palp   Psychiatric: He has a normal mood and affect. His behavior is normal. Thought content normal.   Nursing note and vitals reviewed.      Significant Labs:     U/A- ordered  UDS  Results for orders placed or performed during the hospital encounter of 03/23/20   Drug screen panel, emergency   Result Value Ref Range    Benzodiazepines Negative     Methadone metabolites Negative     Cocaine (Metab.) Negative     Opiate Scrn, Ur Negative     Barbiturate Screen, Ur Negative     Amphetamine Screen, Ur Negative     THC Negative     Phencyclidine Negative     Creatinine, Random Ur 133.1 23.0 - 375.0 mg/dL    Toxicology Information SEE COMMENT      CBC  Results for orders placed or performed during the hospital encounter of 03/23/20   CBC auto differential   Result Value Ref Range    WBC 4.91 3.90 - 12.70 K/uL    RBC 4.54 (L) 4.60 - 6.20 M/uL    Hemoglobin 14.0 14.0 - 18.0 g/dL    Hematocrit 39.9 (L) 40.0 - 54.0 %    Mean Corpuscular Volume 88 82 - 98 fL    Mean Corpuscular Hemoglobin 30.8 27.0 - 31.0 pg    Mean Corpuscular Hemoglobin Conc 35.1 32.0 - 36.0 g/dL    RDW 12.9 11.5 - 14.5 %    Platelets 197 150 - 350 K/uL    MPV 10.5 9.2 - 12.9 fL    Immature Granulocytes CANCELED 0.0 - 0.5 %    Immature Grans (Abs) CANCELED 0.00 - 0.04 K/uL    Lymph # CANCELED 1.0 - 4.8 K/uL    Mono # CANCELED 0.3 - 1.0 K/uL    Eos # CANCELED 0.0 - 0.5 K/uL    Baso # CANCELED 0.00 - 0.20 K/uL    nRBC 0 0 /100 WBC    Gran% 58.0 38.0 - 73.0 %    Lymph% 22.0 18.0 - 48.0 %    Mono% 10.0 4.0 - 15.0 %    Eosinophil% 0.0 0.0 - 8.0 %    Basophil% 0.0 0.0 - 1.9 %    Bands 10.0 %    Platelet Estimate Appears normal     Differential Method Manual      CMP  Results for orders placed or performed during the hospital encounter of 03/23/20   Comprehensive metabolic panel   Result Value Ref Range     Sodium 132 (L) 136 - 145 mmol/L    Potassium 3.8 3.5 - 5.1 mmol/L    Chloride 99 95 - 110 mmol/L    CO2 22 (L) 23 - 29 mmol/L    Glucose 262 (H) 70 - 110 mg/dL    BUN, Bld 26 (H) 6 - 20 mg/dL    Creatinine 1.4 0.5 - 1.4 mg/dL    Calcium 8.0 (L) 8.7 - 10.5 mg/dL    Total Protein 6.7 6.0 - 8.4 g/dL    Albumin 3.5 3.5 - 5.2 g/dL    Total Bilirubin 0.1 0.1 - 1.0 mg/dL    Alkaline Phosphatase 97 55 - 135 U/L    AST 34 10 - 40 U/L    ALT 28 10 - 44 U/L    Anion Gap 11 8 - 16 mmol/L    eGFR if African American >60 >60 mL/min/1.73 m^2    eGFR if non African American 55 (A) >60 mL/min/1.73 m^2     TSH  Results for orders placed or performed during the hospital encounter of 03/23/20   TSH   Result Value Ref Range    TSH 7.479 (H) 0.400 - 4.000 uIU/mL   free t4 1.39  ETOH  Results for orders placed or performed during the hospital encounter of 03/23/20   Ethanol   Result Value Ref Range    Alcohol, Medical, Serum <10 <10 mg/dL     Salicylate  Results for orders placed or performed during the hospital encounter of 03/23/20   Salicylate level   Result Value Ref Range    Salicylate Lvl <5.0 (L) 15.0 - 30.0 mg/dL     Acetaminophen  Results for orders placed or performed during the hospital encounter of 03/23/20   Acetaminophen level   Result Value Ref Range    Acetaminophen (Tylenol), Serum <3.0 (L) 10.0 - 20.0 ug/mL             Assessment/Plan:     Acute renal insufficiency  This is resolved as of todays repeat bmp      Hyponatremia  No diuretics, watch antipsychotics with this being his baseline      Abnormal TSH    Free t4 and t3 wnl    Bipolar affective disorder, manic, severe, with psychotic behavior  Further orders per psych      Mixed hyperlipidemia  Resume statin      Uncontrolled type 2 diabetes mellitus with hyperglycemia, without long-term current use of insulin  Amaryl, metformin and trulicity on home med list. A1C pending. Bs 255 this am.   Start DM diet. SSI as well as resume metformin while here hold amaryl  "      Essential hypertension  Should be on ace/arb due to DM diagnosis. Has lisinopril 5mg on med list. Bp running on low side and patient reports that he was on something for bp but was taken off because "it leveled out". I will not start Ace/arb inpatient unless bp rises. Should f/u oupt to have this added to medications for renal protection       Bradycardia  Actually HR is remaining in 90s here        VTE Risk Mitigation (From admission, onward)    None              Thank you for your consult. I will sign off. Please contact us if you have any additional questions.    Tiarra Ahn NP  Department of Hospital Medicine   [facility]    "

## 2020-03-24 NOTE — CONSULTS
"  [facility]  Adult Nutrition  Consult Note    SUMMARY     Recommendations    Recommendation:  1. Continue Diabetic diet as ordered;  2. encourage pt to increase intake of meals    Goals: increase intake of meals to 75% by f/u  Nutrition Goal Status: new  Communication of RD Recs: (POC)    Reason for Assessment    Reason For Assessment: consult  Diagnosis: psychological disorder  Relevant Medical History: Bipolar 1 disorder, T2DM, psych, HTN, craig    General Information Comments: 11.8% non-significant wt loss noted in 1 yr. 50% intake of breakfast this AM, will monitor for changes in intake. NFPE not completed per psych status.    Nutrition Discharge Planning: Diabetic/low sodium     Nutrition Risk Screen    Nutrition Risk Screen: no indicators present    Nutrition/Diet History    Spiritual, Cultural Beliefs, Episcopal Practices, Values that Affect Care: no  Food Allergies: NKFA    Anthropometrics    Temp: 97.9 °F (36.6 °C)  Height Method: Stated  Height: 5' 10" (177.8 cm)  Height (inches): 70 in  Weight Method: Standard Scale  Weight: 92 kg (202 lb 11.4 oz)  Weight (lb): 202.71 lb  Ideal Body Weight (IBW), Male: 166 lb  % Ideal Body Weight, Male (lb): 122.11 %  BMI (Calculated): 29.1  BMI Grade: 25 - 29.9 - overweight       Lab/Procedures/Meds    Pertinent Labs Reviewed: reviewed  Pertinent Labs Comments: Na 132, BUN 24, glucose 255, calcium 8.4  Pertinent Medications Reviewed: reviewed  Pertinent Medications Comments: Folic acid, metformin, multivitamin, statin    Estimated/Assessed Needs    Weight Used For Calorie Calculations: 91.6 kg (202 lb)  Energy Calorie Requirements (kcal): 2258  Energy Need Method: McKean-St Nathan  Protein Requirements: 73-91 g(.8-1 g/kg)  Weight Used For Protein Calculations: 91.6 kg (202 lb)     Estimated Fluid Requirement Method: RDA Method  RDA Method (mL): 2258  CHO Requirement: 50% of calories    Nutrition Prescription Ordered    Current Diet Order: " Diabetic    Evaluation of Received Nutrient/Fluid Intake    Fluid Required: meeting needs  % Intake of Estimated Energy Needs: 50 - 75 %  % Meal Intake: 50 - 75 %    Nutrition Risk    Level of Risk/Frequency of Follow-up: (1x/wk)     Assessment and Plan  Nutrition Problem:  Inadequate energy intake    Related to (etiology):   psychosis    Signs and Symptoms (as evidenced by):   Meal intake is 50%    Interventions:  General Healthful Diet    Nutrition Diagnosis Status:   New   Monitor and Evaluation    Food and Nutrient Intake: energy intake, food and beverage intake  Food and Nutrient Adminstration: diet order  Anthropometric Measurements: weight, weight change, body mass index  Biochemical Data, Medical Tests and Procedures: electrolyte and renal panel, glucose/endocrine profile  Nutrition-Focused Physical Findings: overall appearance     Nutrition Follow-Up    RD Follow-up?: Yes
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
## 2020-03-24 NOTE — HPI
58 yo male patient that presented to ER with EMS delusional. Hx of bipolar. He was admitted to [facility] and medicine was consulted for H/p. He also has hx of DM, HTN and bradycardia. He report sthat his only complaint is bruised ribs from recent trip and fall. Xray without rib fractures from ER. No SOB or difficulty breathing. % on RA. Other VSS/labs reviewed.
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
## 2020-03-24 NOTE — PLAN OF CARE
Pt is cooperative.  Slightly elevated with some pressured speech.  Restless but compliant with treatment thus far.  Denies any S/I or H/I.  Attending group and interacting appropriately with others.  No acute distress apparent at this time, will continue to monitor.

## 2020-03-25 LAB
POCT GLUCOSE: 199 MG/DL (ref 70–110)
POCT GLUCOSE: 220 MG/DL (ref 70–110)
POCT GLUCOSE: 224 MG/DL (ref 70–110)
POCT GLUCOSE: 230 MG/DL (ref 70–110)

## 2020-03-25 PROCEDURE — 11400000 HC PSYCH PRIVATE ROOM

## 2020-03-25 PROCEDURE — 25000003 PHARM REV CODE 250: Performed by: PSYCHIATRY & NEUROLOGY

## 2020-03-25 PROCEDURE — 99233 PR SUBSEQUENT HOSPITAL CARE,LEVL III: ICD-10-PCS | Mod: S$PBB,,, | Performed by: PSYCHIATRY & NEUROLOGY

## 2020-03-25 PROCEDURE — 90833 PR PSYCHOTHERAPY W/PATIENT W/E&M, 30 MIN (ADD ON): ICD-10-PCS | Mod: S$PBB,,, | Performed by: PSYCHIATRY & NEUROLOGY

## 2020-03-25 PROCEDURE — 90833 PSYTX W PT W E/M 30 MIN: CPT | Mod: S$PBB,,, | Performed by: PSYCHIATRY & NEUROLOGY

## 2020-03-25 PROCEDURE — 99233 SBSQ HOSP IP/OBS HIGH 50: CPT | Mod: S$PBB,,, | Performed by: PSYCHIATRY & NEUROLOGY

## 2020-03-25 PROCEDURE — 63600175 PHARM REV CODE 636 W HCPCS: Performed by: PSYCHIATRY & NEUROLOGY

## 2020-03-25 RX ORDER — OLANZAPINE 10 MG/1
10 TABLET ORAL NIGHTLY
Status: DISCONTINUED | OUTPATIENT
Start: 2020-03-25 | End: 2020-03-26

## 2020-03-25 RX ORDER — CYANOCOBALAMIN 1000 UG/ML
1000 INJECTION, SOLUTION INTRAMUSCULAR; SUBCUTANEOUS ONCE
Status: COMPLETED | OUTPATIENT
Start: 2020-03-25 | End: 2020-03-25

## 2020-03-25 RX ADMIN — THERA TABS 1 TABLET: TAB at 08:03

## 2020-03-25 RX ADMIN — Medication 1 TABLET: at 10:03

## 2020-03-25 RX ADMIN — SIMVASTATIN 20 MG: 10 TABLET, FILM COATED ORAL at 08:03

## 2020-03-25 RX ADMIN — METFORMIN HYDROCHLORIDE 1000 MG: 500 TABLET ORAL at 05:03

## 2020-03-25 RX ADMIN — CARBAMAZEPINE 100 MG: 100 TABLET, EXTENDED RELEASE ORAL at 08:03

## 2020-03-25 RX ADMIN — SAXAGLIPTIN 5 MG: 2.5 TABLET, FILM COATED ORAL at 08:03

## 2020-03-25 RX ADMIN — METFORMIN HYDROCHLORIDE 1000 MG: 500 TABLET ORAL at 08:03

## 2020-03-25 RX ADMIN — INSULIN ASPART 2 UNITS: 100 INJECTION, SOLUTION INTRAVENOUS; SUBCUTANEOUS at 08:03

## 2020-03-25 RX ADMIN — INSULIN ASPART 2 UNITS: 100 INJECTION, SOLUTION INTRAVENOUS; SUBCUTANEOUS at 11:03

## 2020-03-25 RX ADMIN — HYDROXYZINE PAMOATE 50 MG: 50 CAPSULE ORAL at 10:03

## 2020-03-25 RX ADMIN — INSULIN ASPART 1 UNITS: 100 INJECTION, SOLUTION INTRAVENOUS; SUBCUTANEOUS at 08:03

## 2020-03-25 RX ADMIN — OLANZAPINE 10 MG: 10 TABLET, FILM COATED ORAL at 08:03

## 2020-03-25 RX ADMIN — CYANOCOBALAMIN 1000 MCG: 1000 INJECTION, SOLUTION INTRAMUSCULAR; SUBCUTANEOUS at 10:03

## 2020-03-25 RX ADMIN — FOLIC ACID 1 MG: 1 TABLET ORAL at 08:03

## 2020-03-25 RX ADMIN — ASPIRIN 81 MG: 81 TABLET, COATED ORAL at 08:03

## 2020-03-25 NOTE — PLAN OF CARE
Pt out on unit all shift.Pt with poor sleep last night.Appetite good.Hygiene and grooming poor.Pt participating in unit activities.Pt with inappropriate smiling and affect.Pt currently denies si and hearing voices.Medication compliant.

## 2020-03-25 NOTE — PROGRESS NOTES
"   03/25/20 1040   [facility] Group Therapy   Group Name Therapeutic Recreation   Specific Interventions Cognitive Stimulation Training   Participation Level Attentive   Participation Quality Cooperative   Insight/Motivation Applies New Skills;Good   Affect/Mood Display Other (see comments)   Cognition Alert   Psychomotor WNL   Patient smiling inappropriately, attention seeking, interrupting CTRS while assisting others, easily redirected. Patient states "I feel like I accomplished something today. I'm proud of myself." Patient says "I'll be here 5 more days. I want to do some more puzzles too."  "

## 2020-03-25 NOTE — PROGRESS NOTES
"PSYCHIATRY DAILY INPATIENT PROGRESS NOTE  SUBSEQUENT HOSPITAL VISIT    ENCOUNTER DATE: 3/25/2020  SITE: [facility]    DATE OF ADMISSION: 3/24/2020 12:41 AM  LENGTH OF STAY: 1 days      HISTORY    CHIEF COMPLAINT   Chau Lovett is a 59 y.o. male, seen during daily contreras rounds on the inpatient unit.  Chau Lovett presents with the chief complaint of psychosis/hallucinations, "I have been falling."    HPI   (Elements: Location, Quality, Severity, Duration, Timing, Content, Modifying Factors, Associated Signs & Symptoms)    The patient was seen and examined. The chart was reviewed.     Reviewed notes by LMSW, RN, CTRS, LPN, NP and RD from the last 24 hours.    The patient's case was discussed with the treatment team and care providers today, including RN, CTRS, LMSW and Specialty Services.    Staff reports no behavioral or management issues.     The patient has been compliant with treatment. The patient denied any side effects.    The patient continues to lack insight into his illness. He denies all psychiatric symptoms, despite ongoing symtpoms of psychosis/craig which remain fx/bx impairing.     Denied Symptoms of Depression: no diminished mood or loss of interest/anhedonia; no irritability, diminished energy, change in sleep, change in appetite, diminished concentration or cognition or indecisiveness, PMA/R, excessive guilt or hopelessness or worthlessness, or suicidal ideations     Continued Changes in Sleep: +trouble with initiation/maintenance, no early morning awakening with inability to return to sleep; +diminshed need for sleep; pt slept 4.5 hours last night     Denied Suicidal/Homicidal ideations: no active/passive ideations, organized plans, or future intentions     Continued Symptoms of psychosis: +hallucinations, no delusions, no disorganized thinking, no disorganized behavior or abnormal motor behavior, no negative symptoms      Continued Symptoms of craig or hypomania: no " elevated, no expansive, or +irritable mood with +increased energy or activity; with no inflated self-esteem or grandiosity, +decreased need for sleep, no increased rate of speech, no FOI or racing thoughts, +distractibility, no increased goal directed activity or PMA, +risky/disinhibited behavior    Denied Symptoms of Anxiety: no excessive anxiety/worry/fear, no panic attacks; without agoraphobia    PSYCHOTHERAPY ADD-ON +87525   30 (16-37*) minutes    Time: 16 minutes  Participants: Met with patient    Therapeutic Intervention Type: behavior modifying psychotherapy, supportive psychotherapy  Why chosen therapy is appropriate versus another modality: relevant to diagnosis, patient responds to this modality, evidence based practice    Target symptoms: mood disorder, psychosis  Primary focus: psychosis, stressors  Psychotherapeutic techniques: supportive and psycho-dynamic techniques; psycho-education    Outcome monitoring methods: self-report, observation    Patient's response to intervention:  The patient's response to intervention is accepting.    Progress toward goals:  The patient's progress toward goals is limited.          ROS  General ROS: negative  Ophthalmic ROS: negative  ENT ROS: negative  Allergy and Immunology ROS: negative  Hematological and Lymphatic ROS: negative  Endocrine ROS: negative  Respiratory ROS: no cough, shortness of breath, or wheezing  Cardiovascular ROS: no chest pain or dyspnea on exertion  Gastrointestinal ROS: no abdominal pain, change in bowel habits, or black or bloody stools  Genito-Urinary ROS: no dysuria, trouble voiding, or hematuria  Musculoskeletal ROS: negative  Neurological ROS: no TIA or stroke symptoms  Dermatological ROS: negative    PAST MEDICAL HISTORY   Past Medical History:   Diagnosis Date    Anxiety     Bipolar 1 disorder     Bradycardia     Depression     Diabetes mellitus     Hallucination     [facility]     of psychiatric care     Hypertension     Jacinta     Nuclear sclerosis of both eyes 10/22/2015    Psychiatric exam requested by authority     Psychiatric problem     Psychosis     Renal disorder     Schizoaffective disorder     Sleep difficulties     Therapy     Thyroid disease     possible           PSYCHOTROPIC MEDICATIONS   Scheduled Meds:   aspirin  81 mg Oral Daily    carBAMazepine  100 mg Oral BID    folic acid  1 mg Oral Daily    metFORMIN  1,000 mg Oral BID WM    multivitamin  1 tablet Oral Daily    OLANZapine  5 mg Oral QHS    SAXagliptin  5 mg Oral Daily    simvastatin  20 mg Oral QHS     Continuous Infusions:  PRN Meds:.acetaminophen, aluminum-magnesium hydroxide-simethicone, docusate sodium, glucagon (human recombinant), glucose, glucose, glucose, hydrOXYzine pamoate, insulin aspart U-100, loperamide, OLANZapine **AND** OLANZapine        EXAMINATION    VITALS   Vitals:    03/25/20 0738   BP: 119/64   Pulse: 91   Resp: 20   Temp: 98.3 °F (36.8 °C)     Body mass index is 28.72 kg/m².      CONSTITUTIONAL  General Appearance: , in hospital garb, overweight; NAD     MUSCULOSKELETAL  Muscle Strength and Tone:  normal  Abnormal Involuntary Movements:  None aside from mild hand shake  Gait and Station:  normal; mildly ataxic     PSYCHIATRIC   Level of Consciousness: awake, alert  Orientation: p/p/t/s  Grooming:  adequate to circumstances  Psychomotor Behavior: no PMA/R  Speech: nl r/t/v/s  Language:  English fluent  Mood: "god"  Affect: less decreased range, less irritable  Thought Process: mostly linear and organized; racing at thoughts  Associations:  intact; no CARLEY  Thought Content:  denied AVH/delusions; denied HI/SI  Memory:  intact to recent and remote events  Attention: mostly intact to conversation; +somewhat distractible   Fund of Knowledge:  age and education appropriate  Estimate if Intelligence:  average based on work/education history, vocabulary and " mental status exam  Insight:  fair- partially seeks help, understands/accepts illness  Judgment:   good- no bx issues, compliant and cooperative        DIAGNOSTIC TESTING   Laboratory Results  Recent Results (from the past 24 hour(s))   POCT glucose    Collection Time: 03/24/20 11:36 AM   Result Value Ref Range    POCT Glucose 267 (H) 70 - 110 mg/dL   POCT glucose    Collection Time: 03/24/20  4:21 PM   Result Value Ref Range    POCT Glucose 267 (H) 70 - 110 mg/dL   POCT glucose    Collection Time: 03/24/20  8:23 PM   Result Value Ref Range    POCT Glucose 219 (H) 70 - 110 mg/dL   POCT glucose    Collection Time: 03/25/20  7:31 AM   Result Value Ref Range    POCT Glucose 224 (H) 70 - 110 mg/dL         ASSESSMENT      IMPRESSION   Bipolar I Disorder MRE manic, severe with psychotic features     EPS     Psychosocial stressors     Vitamin D insufficiency  B12 deficiency      Acute renal insufficiency  Hyponatremia  Abnormal TSH  Mixed hyperlipidemia  Uncontrolled type 2 diabetes mellitus with hyperglycemia, without long-term current use of insulin  Essential hypertension     MEDICAL DECISION MAKING       PROBLEM LIST AND MANAGEMENT PLANS; PRESCRIPTION DRUG MANAGEMENT  Compliance: yes  Side Effects: no  Regimen Adjustments:      Bipolar I Disorder mre manic, severe with psychotic features: pt counseled  -Start re-trial of Tegretol CR at 100 mg po BID- increase to 200 mg po TID- check level in 4 days; will titrate as indicated  -Start re-trial of Zyprexa 5 mg po q HS- increase to 10 mg po q HS  -we discussed his medication options in depth; the above medications were the only ones he was willing to try at this time     EPS: pt counseled  -continue to monitor for now; pt declining pharmacotherapy  -consider trial of cogentin     Psychosocial stressors: Pt counseled  -SW consulted and assisting with resources     Vitamin D insufficiency: Pt counseled  -Start Vitamin D3 50,000 units po q week x 8 weeks (1/8 given)     B12  deficiency:  Pt counseled  -Give B12 1000 mcg IM x 1 on 3/24/20  -Start/continue Folbic po q day     Acute renal insufficiency: Pt counseled  -likely from poor PO intake   -improved/resolved     Hyponatremia: pt counseled  -recheck and continue to monitor     Abnormal TSH:pt counseled  -recheck and continue to monitor     Mixed hyperlipidemia: pt counseled  Resume/continue simvastatin 20 mg po q day     Uncontrolled type 2 diabetes mellitus with hyperglycemia, without long-term current use of insulin: pt counseled  -resumed/continue metformin 1000 mg po BID with meals and Saxagliptim 5 mg po q day     Essential hypertension: pt counseled  Hold lisinopril for now; monitor BP closely and resume if needed        DIAGNOSTIC TESTING  Labs reviewed with patient; follow up pending labs; check tegretol, CBC, CMP and TSH in 4 days     Disposition:  -SW to assist with aftercare planning and collateral  -Once stable discharge home with outpatient follow up care and/or rehab  -Continue inpatient treatment under a PEC and/or CEC for grave disability as evident by fx/bx impairing mood and psychotic symptoms    NEED FOR CONTINUED HOSPITALIZATION  Psychiatric illness continues to pose a potential threat to life or bodily function, of self or others, thereby requiring the need for continued inpatient psychiatric hospitalization: Yes    Protective inpatient pyschiatric hospitalization required while a safe disposition plan is enacted: Yes    Patient stabilized and ready for discharge from inpatient psychiatric unit: No      STAFF:   Bao Naranjo MD  Psychiatry

## 2020-03-25 NOTE — PLAN OF CARE
Problem: Adult Behavioral Health Plan of Care  Goal: Develops/Participates in Therapeutic [place] to Support Successful Transition  Intervention: Foster Therapeutic [place]  Flowsheets (Taken 3/25/2020 1152)  Trust Relationship/Rapport: care explained; choices provided; thoughts/feelings acknowledged; emotional support provided; empathic listening provided; questions answered; questions encouraged; reassurance provided     Russell Amin at [phone], the patient's POA called to ask about the question he had regarding transfer.  reported what the psychiatrist said: a transfer can be done, but it has to be set up by the family and [facility] inpatient, and they would have to pay for it, as he is already receiving treatment here. He did not seem interested in this scenario and asked how he could ensure that the patient be sent to [facility] inpatient in the future to ensure continuity of care, since [facility] is now his PHP/ outpatient provider.  asked her supervisor and the discharge planner to confirm.      relayed that the power of a POA is only in cases where the patient is deemed incompetent, going to an inpatient unit does not deem the patient incompetent, incompetence is based on a judges ruling. Therefore, he is welcome to request, but the patient still has the ability to object. The patient can request, when he goes to an ER, to be sent to [facility]'s inpatient facility. Continuity and stability of care as he says it, is important. However, these are crisis situations, so choice is limited. The patient's placement will be based on the physicians approval - at the unit- and bed availability/ time frame of bed availability. There is no way at this time to ensure that all of his care be at a particular unit. I recommend you talk with [facility], maybe there is a way that you can bring him to them when in crisis, but I am unsure if they have the ability to PEC patient's at their  facility, which is usually why patient's are sent through the ER. If they do not, they would have to go through an ER either way.    He says that the best number to reach him at is his cell at [phone]. He verbalized understanding of the information given and says that correlates with what [facility] told him. He asked that the patient be set up with aftercare at [facility], that he be notified when patient is going to discharge due to needing to set up things like an aunt that goes cook for him. He also asked that the psychiatrist emphasize that he cannot drive unless someone gives him the okay due to his tendency to believe he can drive unless told otherwise. He says that he took his car with him, and the patient has been trying to buy a car. He says that he was signing papers at a dealership until the salesman called to confirm income with the cousin.

## 2020-03-25 NOTE — PLAN OF CARE
Problem: Adult Behavioral Health Plan of Care  Goal: Optimized Coping Skills in Response to Life Stressors  Intervention: Promote Effective Coping Strategies  Flowsheets (Taken 3/25/2020 1326)  Supportive Measures: active listening utilized; positive reinforcement provided; verbalization of feelings encouraged; counseling provided; problem solving facilitated; decision-making supported; relaxation techniques promoted; goal setting facilitated; self-reflection promoted; journaling promoted; self-responsibility promoted; self-care encouraged     Behavior: Patient attended psychotherapeutic group dressed in hospital scrubs, appropriate grooming with innapropriate smiling and elavated mood, attentive posture, and intense eye contact. Patient remained engaged and attentive.    Intervention:  will engage patients in a CBT based process group focused on interpersonal boundaries. Patient's will be engaged with discussion on boundaries, and asked to apply to their own lives, whether it applies to the self or to others. Patients will be encouraged to express concerns and goals for treatment and discharge, as well as perceived barriers to progress.  will aid patients in prioritizing and making realistic goals, identifying strengths that will aid in transition, and verbalizing emotions.     Response: Patient remained attentive and engaged. He interrupts often and repeats himself. He has quotes like reach for the stars that he likes to say. He has minimal insight and trouble understanding basic concepts. He did verbalize ways in which boundaries could be violated and mentioned that ultimatums are a form of violating sexual boundaries.     Plan:  will continue to encourage patient to explore and verbalize emotions, set goals to aid in preventing re-hospitalization, attend psychotherapeutic group, and follow up with aftercare appointments.

## 2020-03-25 NOTE — PSYCH
Received medication list from [facility] in [place], [place].  Printed a copy and placed it on patients chart.   will present it to the doctor in the morning.

## 2020-03-25 NOTE — PLAN OF CARE
Plan of care reviewed. Denies intent to harm self or others at this time. Not displaying any signs of psychosis at this time. Accepts snacks and medications. Gait steady, no falls. Interacting with staff and peers. Displays overly friendly behavior. Blood glucose: 219 mg/dl. Aspart Insulin 1 unit given SQ. Promoted individualized safety plan, reality-based interactions, effective coping strategies, and impulse control. Will continue precautions and monitor for safety.

## 2020-03-26 LAB
POCT GLUCOSE: 190 MG/DL (ref 70–110)
POCT GLUCOSE: 208 MG/DL (ref 70–110)
POCT GLUCOSE: 210 MG/DL (ref 70–110)
POCT GLUCOSE: 256 MG/DL (ref 70–110)

## 2020-03-26 PROCEDURE — 99233 SBSQ HOSP IP/OBS HIGH 50: CPT | Mod: ,,, | Performed by: PSYCHIATRY & NEUROLOGY

## 2020-03-26 PROCEDURE — 99233 PR SUBSEQUENT HOSPITAL CARE,LEVL III: ICD-10-PCS | Mod: ,,, | Performed by: PSYCHIATRY & NEUROLOGY

## 2020-03-26 PROCEDURE — 90833 PR PSYCHOTHERAPY W/PATIENT W/E&M, 30 MIN (ADD ON): ICD-10-PCS | Mod: ,,, | Performed by: PSYCHIATRY & NEUROLOGY

## 2020-03-26 PROCEDURE — 25000003 PHARM REV CODE 250: Performed by: PSYCHIATRY & NEUROLOGY

## 2020-03-26 PROCEDURE — 90833 PSYTX W PT W E/M 30 MIN: CPT | Mod: ,,, | Performed by: PSYCHIATRY & NEUROLOGY

## 2020-03-26 PROCEDURE — 11400000 HC PSYCH PRIVATE ROOM

## 2020-03-26 RX ORDER — CARBAMAZEPINE 100 MG/1
200 TABLET, EXTENDED RELEASE ORAL 2 TIMES DAILY
Status: DISCONTINUED | OUTPATIENT
Start: 2020-03-26 | End: 2020-03-29

## 2020-03-26 RX ADMIN — SIMVASTATIN 20 MG: 10 TABLET, FILM COATED ORAL at 08:03

## 2020-03-26 RX ADMIN — THERA TABS 1 TABLET: TAB at 09:03

## 2020-03-26 RX ADMIN — INSULIN ASPART 2 UNITS: 100 INJECTION, SOLUTION INTRAVENOUS; SUBCUTANEOUS at 12:03

## 2020-03-26 RX ADMIN — CARBAMAZEPINE 200 MG: 100 TABLET, EXTENDED RELEASE ORAL at 08:03

## 2020-03-26 RX ADMIN — OLANZAPINE 15 MG: 5 TABLET, FILM COATED ORAL at 08:03

## 2020-03-26 RX ADMIN — Medication 1 TABLET: at 09:03

## 2020-03-26 RX ADMIN — ASPIRIN 81 MG: 81 TABLET, COATED ORAL at 09:03

## 2020-03-26 RX ADMIN — SAXAGLIPTIN 5 MG: 2.5 TABLET, FILM COATED ORAL at 09:03

## 2020-03-26 RX ADMIN — METFORMIN HYDROCHLORIDE 1000 MG: 500 TABLET ORAL at 05:03

## 2020-03-26 RX ADMIN — METFORMIN HYDROCHLORIDE 1000 MG: 500 TABLET ORAL at 08:03

## 2020-03-26 RX ADMIN — CARBAMAZEPINE 100 MG: 100 TABLET, EXTENDED RELEASE ORAL at 09:03

## 2020-03-26 RX ADMIN — INSULIN ASPART 2 UNITS: 100 INJECTION, SOLUTION INTRAVENOUS; SUBCUTANEOUS at 09:03

## 2020-03-26 RX ADMIN — INSULIN ASPART 3 UNITS: 100 INJECTION, SOLUTION INTRAVENOUS; SUBCUTANEOUS at 05:03

## 2020-03-26 NOTE — PROGRESS NOTES
"PSYCHIATRY DAILY INPATIENT PROGRESS NOTE  SUBSEQUENT HOSPITAL VISIT    ENCOUNTER DATE: 3/26/2020  [facility]    DATE OF ADMISSION: 3/24/2020 12:41 AM  LENGTH OF STAY: 2 days      HISTORY    CHIEF COMPLAINT   Chau Lovett is a 59 y.o. male, seen during daily contreras rounds on the inpatient unit.  Chua Lovett presents with the chief complaint of psychosis/hallucinations, "I have been falling."    HPI   (Elements: Location, Quality, Severity, Duration, Timing, Content, Modifying Factors, Associated Signs & Symptoms)    The patient was seen and examined. The chart was reviewed.     Reviewed notes by LMSW, CTRS, Speciality Services, and RN from the last 24 hours.    The patient's case was discussed with the treatment team and care providers today, including RN, CTRS, LMSW and Specialty Services.    Staff reports no behavioral or management issues.     The patient has been compliant with treatment. The patient denied any side effects.    The patient continues to lack insight into his illness. He denies all psychiatric symptoms, despite ongoing symtpoms of psychosis/craig which remain fx/bx impairing. He discussed how he "tries to touch the sun.. I can do whatever I want." He attributes this to his craig.     Denied Symptoms of Depression: no diminished mood or loss of interest/anhedonia; no irritability, diminished energy, change in sleep, change in appetite, diminished concentration or cognition or indecisiveness, PMA/R, excessive guilt or hopelessness or worthlessness, or suicidal ideations     Continued Changes in Sleep: +trouble with initiation/maintenance, no early morning awakening with inability to return to sleep; +diminshed need for sleep; pt slept 4.5 hours last night     Denied Suicidal/Homicidal ideations: no active/passive ideations, organized plans, or future intentions     Continued Symptoms of psychosis: +hallucinations, no delusions, no disorganized thinking, no disorganized " behavior or abnormal motor behavior, no negative symptoms      Continued Symptoms of craig or hypomania: no elevated, no expansive, or +irritable mood with +increased energy or activity; with no inflated self-esteem or grandiosity, +decreased need for sleep, no increased rate of speech, no FOI or racing thoughts, +distractibility, no increased goal directed activity or PMA, +risky/disinhibited behavior    Denied Symptoms of Anxiety: no excessive anxiety/worry/fear, no panic attacks; without agoraphobia    PSYCHOTHERAPY ADD-ON +[id number]   30 (16-37*) minutes    Time: 16 minutes  Participants: Met with patient    Therapeutic Intervention Type: behavior modifying psychotherapy, supportive psychotherapy  Why chosen therapy is appropriate versus another modality: relevant to diagnosis, patient responds to this modality, evidence based practice    Target symptoms: mood disorder, psychosis  Primary focus: psychosis, stressors, compliance  Psychotherapeutic techniques: supportive and psycho-dynamic techniques; psycho-education; motivational techniques     Outcome monitoring methods: self-report, observation    Patient's response to intervention:  The patient's response to intervention is accepting.    Progress toward goals:  The patient's progress toward goals is limited.          ROS  General ROS: negative  Ophthalmic ROS: negative  ENT ROS: negative  Allergy and Immunology ROS: negative  Hematological and Lymphatic ROS: negative  Endocrine ROS: negative  Respiratory ROS: no cough, shortness of breath, or wheezing  Cardiovascular ROS: no chest pain or dyspnea on exertion  Gastrointestinal ROS: no abdominal pain, change in bowel habits, or black or bloody stools  Genito-Urinary ROS: no dysuria, trouble voiding, or hematuria  Musculoskeletal ROS: negative  Neurological ROS: no TIA or stroke symptoms  Dermatological ROS: negative    PAST MEDICAL HISTORY   Past Medical History:   Diagnosis Date    Anxiety     Bipolar 1 disorder  "    Bradycardia     Depression     Diabetes mellitus     Hallucination     History of psychiatric hospitalization     [place] 2/2020 and  [facility]    Hx of psychiatric care     Hypertension     [place]     Nuclear sclerosis of both eyes 10/22/2015    Psychiatric exam requested by authority     Psychiatric problem     Psychosis     Renal disorder     Schizoaffective disorder     Sleep difficulties     Therapy     Thyroid disease     possible           PSYCHOTROPIC MEDICATIONS   Scheduled Meds:   aspirin  81 mg Oral Daily    carBAMazepine  100 mg Oral BID    folic acid-vit B6-vit B12 2.5-25-2 mg  1 tablet Oral Daily    metFORMIN  1,000 mg Oral BID WM    multivitamin  1 tablet Oral Daily    OLANZapine  10 mg Oral QHS    SAXagliptin  5 mg Oral Daily    simvastatin  20 mg Oral QHS     Continuous Infusions:  PRN Meds:.acetaminophen, aluminum-magnesium hydroxide-simethicone, docusate sodium, glucagon (human recombinant), glucose, glucose, glucose, hydrOXYzine pamoate, insulin aspart U-100, loperamide, OLANZapine **AND** OLANZapine        EXAMINATION    VITALS   Vitals:    03/26/20 0820   BP: 128/82   Pulse: 95   Resp: 18   Temp: 98 °F (36.7 °C)     Body mass index is 28.72 kg/m².      CONSTITUTIONAL  General Appearance: WM, in hospital garb, overweight; NAD     MUSCULOSKELETAL  Muscle Strength and Tone:  normal  Abnormal Involuntary Movements:  None aside from mild hand shake  Gait and Station:  normal; mildly ataxic     PSYCHIATRIC   Level of Consciousness: awake, alert  Orientation: p/p/t/s  Grooming:  adequate to circumstances  Psychomotor Behavior: no PMA/R  Speech: nl r/t/v/s  Language:  English fluent  Mood: "ok"  Affect: less decreased range, less irritable  Thought Process: mostly linear and organized; racing at thoughts  Associations:  intact; no CARLEY  Thought Content:  denied AVH/delusions; denied HI/SI  Memory:  intact to recent and remote events  Attention: mostly intact to " conversation; +somewhat distractible   Fund of Knowledge:  age and education appropriate  Estimate if Intelligence:  average based on work/education history, vocabulary and mental status exam  Insight:  fair- partially seeks help, understands/accepts illness  Judgment:   good- no bx issues, compliant and cooperative        DIAGNOSTIC TESTING   Laboratory Results  Recent Results (from the past 24 hour(s))   POCT glucose    Collection Time: 03/25/20 11:29 AM   Result Value Ref Range    POCT Glucose 230 (H) 70 - 110 mg/dL   POCT glucose    Collection Time: 03/25/20  4:50 PM   Result Value Ref Range    POCT Glucose 199 (H) 70 - 110 mg/dL   POCT glucose    Collection Time: 03/25/20  8:18 PM   Result Value Ref Range    POCT Glucose 220 (H) 70 - 110 mg/dL   POCT glucose    Collection Time: 03/26/20  8:18 AM   Result Value Ref Range    POCT Glucose 210 (H) 70 - 110 mg/dL         ASSESSMENT      IMPRESSION   Bipolar I Disorder MRE manic, severe with psychotic features     EPS     Psychosocial stressors     Vitamin D insufficiency  B12 deficiency      Acute renal insufficiency  Hyponatremia  Abnormal TSH  Mixed hyperlipidemia  Uncontrolled type 2 diabetes mellitus with hyperglycemia, without long-term current use of insulin  Essential hypertension     MEDICAL DECISION MAKING       PROBLEM LIST AND MANAGEMENT PLANS; PRESCRIPTION DRUG MANAGEMENT  Compliance: yes  Side Effects: no  Regimen Adjustments:      Bipolar I Disorder mre manic, severe with psychotic features: pt counseled  -Start re-trial of Tegretol CR at 100 mg po BID- increase to 200 mg po BID (last known stabilizing dosage)- check level in 3 days; will titrate as indicated  -Start re-trial of Zyprexa 5 mg po q HS- increase to 10 mg po q HS- increase to 15 mg po q HS starting tonight (pt last on 25 mg nightly)  -we discussed his medication options in depth; the above medications were the only ones he was willing to try at this time     EPS: pt counseled  -continue to  monitor for now; pt declining pharmacotherapy  -consider trial of cogentin     Psychosocial stressors: Pt counseled  -SW consulted and assisting with resources     Vitamin D insufficiency: Pt counseled  -Start Vitamin D3 50,000 units po q week x 8 weeks (1/8 given)     B12 deficiency:  Pt counseled  -Give B12 1000 mcg IM x 1 on 3/24/20  -Start/continue Folbic po q day     Acute renal insufficiency: Pt counseled  -likely from poor PO intake   -improved/resolved     Hyponatremia: pt counseled  -recheck and continue to monitor     Abnormal TSH:pt counseled  -recheck and continue to monitor     Mixed hyperlipidemia: pt counseled  Resume/continue simvastatin 20 mg po q day     Uncontrolled type 2 diabetes mellitus with hyperglycemia, without long-term current use of insulin: pt counseled  -resumed/continue metformin 1000 mg po BID with meals and Saxagliptim 5 mg po q day     Essential hypertension: pt counseled  Hold lisinopril for now; monitor BP closely and resume if needed        DIAGNOSTIC TESTING  Labs reviewed with patient; follow up pending labs; check tegretol, CBC, CMP and TSH in 3 days     Disposition:  -SW to assist with aftercare planning and collateral  -Once stable discharge home with outpatient follow up care and/or rehab  -Continue inpatient treatment under a PEC and/or [facility] for grave disability as evident by fx/bx impairing mood and psychotic symptoms    NEED FOR CONTINUED HOSPITALIZATION  Psychiatric illness continues to pose a potential threat to life or bodily function, of self or others, thereby requiring the need for continued inpatient psychiatric hospitalization: Yes    Protective inpatient pyschiatric hospitalization required while a safe disposition plan is enacted: Yes    Patient stabilized and ready for discharge from inpatient psychiatric unit: No      STAFF:   Bao Naranjo MD  Psychiatry

## 2020-03-26 NOTE — PROGRESS NOTES
"   03/25/20 1600   Assessment   Patient's Identification of the Problem Patient cooperative, smiling inappropriate, intense eye contact and reports "good" mood. Patient states his admit is due to "I fell twice. The land was not leveled. I didn't have any shoes on." According to the H&P patient admit is due to Psychosis/Hallucinations. Patient reports a history of Bipolar diagnosis. Patient denies alcohol or drug use. Patient reports he has never been , no children, wears glasses, college graduate, disabled, receive SSI income, lives alone in [place] [facility]. Patient verbalized main goal is to stay healthy.    Leisure Interest Watching TV;Listen to Music;Walk;Sit Outside;Enjoy Family/Friends;Sports   Leisure Barriers In Pain;Lack of Transportation;Lack of Finances   Treatment Focus To Improve Reality Orientation;To Improve Leisure Awareness/Lifestyle/Interest;To Improve Coping Skills;To Promote Social Skills/Tolerance/Interaction     Treatment Recommendation: To address problem(s) #  1:1 Intervention (as needed)    Reality Orientation Skilled Activity  Cognitive Stimulation Skilled Activity  Creative Expression Skilled Activity  Mild Exercises Skilled Activity  Coping Skilled Activity  Leisure Education and Awareness Skilled Activity    Treatment Goal(s):  Long Term Goals Refer To Master Treatment Plan    Short Term Treatment Goal(s)  Patient Will:  Exhibit Improvement in Mood  Demonstrate Improvement in Thought Processes / Awareness  Demonstrate Constructive Expression of Feelings and Behavior    Discharge Recommendations:  Encourage Patient to Actively Utilize Available Community Resources to Increase Leisure Involvement to Decrease Signs and Symptoms of Illness  Encourage Patient to Utilize Coping Skills on a Regular Basis to Reduce the Risk of Decompensating and Re-Hospitalizations  Follow Up with After Care Appointments  Continue with Current Leisure Activities  "

## 2020-03-26 NOTE — PSYCH
The patient has signed a release of information form for [facility]; therefore, a packet has been faxed. The patient has been informed that because of COVID 19, he may be asked to participate in an assessment by phone.

## 2020-03-26 NOTE — PLAN OF CARE
Problem: Adult Behavioral Health Plan of Care  Goal: Develops/Participates in Therapeutic [place] to Support Successful Transition  Outcome: Ongoing, Progressing     The medication regimen/list was obtained by staff from [facility], the patient's outpatient provider. The information was relayed to staff and placed in pt chart.

## 2020-03-26 NOTE — PLAN OF CARE
"  Problem: Adult Behavioral Health Plan of Care  Goal: Optimized Coping Skills in Response to Life Stressors  Intervention: Promote Effective Coping Strategies  Flowsheets (Taken 3/26/2020 3952)  Supportive Measures: active listening utilized; positive reinforcement provided; verbalization of feelings encouraged; counseling provided; problem solving facilitated; decision-making supported; relaxation techniques promoted; goal setting facilitated; self-responsibility promoted; self-reflection promoted; journaling promoted; self-care encouraged     Behavior: Patient attended psychotherapeutic group dressed in personal clothing, appropriate grooming with smiling affect and "great" mood, attentive posture, and appropriate eye contact. Patient remained engaged and attentive.    Intervention:  will engage patients in a DBT based group regarding distress tolerance skills. We will discuss what a crisis situation is and skills that can help us get through times in intense emotions including the STOP skill, pros and cons, TIP your body chemistry, distract with Wise Mind accepts, self soothe with the five senses, and improve the moment. Patients will be given time to express thoughts, concerns and goals for treatment and discharge, as well as perceived barriers to progress.    Response: Patient remained attentive and engaged in conversation. He has minimal insight into his presentation and talks in motivational quotes.  tried to translate his quotes in a way that helped him open up about what has been going on in his life and the problematic behaviors. He says that "my adrenaline starts and then it wont stop."  translated that as needing to take a step back and think of his next move before he acts. He agreed that this would be beneficial. He said that TIP sounded like a tool he would use.     Plan:  will continue to encourage patient to explore and verbalize emotions, set " goals to aid in preventing re-hospitalization, attend psychotherapeutic group, and follow up with aftercare appointments.

## 2020-03-26 NOTE — PLAN OF CARE
Plan of care reviewed. Denies intent to harm self or others at this time. Accepts snacks and medications. Gait steady, no falls. Interacting with staff and peers. Exhibits extremely friendly behavior. Very talkative. Promoted individualized safety plan, reality-based interactions, effective coping strategies, and impulse control. Will continue precautions and monitor for safety.

## 2020-03-26 NOTE — PLAN OF CARE
Ambulating in room and in hallway. Has asked to take bath and was informed that bathtime is at 6:30 AM. Slept 5 hours thus far. Safety precautions maintained. Rounds done every 15 minutes. Bed is fixed in low position and room is uncluttered and pathways are clear.

## 2020-03-26 NOTE — PSYCH
Spoke with the patient about options for aftercare; therefore, [facility]'s partial hospitalization program was presented to the patient; however, he stated that he is unable to participate in their program. We also discussed [facility] as another option for treatment; however, the patient stated that he does not like the program. Finally we discussed [facility]'s intensive outpatient program; therefore' the patient stated that he would try the [place]  Program. The social  will obtain a release of information and send a packet for their review.

## 2020-03-26 NOTE — PLAN OF CARE
Pt out on unit all shift.Pt reports he slept well last night but night staff report  he only four hours sleep.Appetite good.Pt denies any complaints.Pt denies hallucinations or receiving messages.Pt with constant smile on face.Participating in unit activities.Medication compliant.

## 2020-03-26 NOTE — PROGRESS NOTES
"   03/26/20 1030   [facility] Group Therapy   Group Name Therapeutic Recreation   Specific Interventions Cognitive Stimulation Training   Participation Level Active;Attentive   Participation Quality Cooperative;Social   Insight/Motivation Applies New Skills;Improved   Affect/Mood Display Other (see comments)  (inapopropriate smiling, attention seeking)   Cognition Alert   Psychomotor WNL   Patient states "I really enjoy doing puzzles. It's challenges me." Patient used markers to decorate artwork and states "coloring helps me with patience."  "

## 2020-03-27 LAB
POCT GLUCOSE: 164 MG/DL (ref 70–110)
POCT GLUCOSE: 177 MG/DL (ref 70–110)
POCT GLUCOSE: 178 MG/DL (ref 70–110)
POCT GLUCOSE: 216 MG/DL (ref 70–110)

## 2020-03-27 PROCEDURE — 90833 PSYTX W PT W E/M 30 MIN: CPT | Mod: ,,, | Performed by: PSYCHIATRY & NEUROLOGY

## 2020-03-27 PROCEDURE — 90833 PR PSYCHOTHERAPY W/PATIENT W/E&M, 30 MIN (ADD ON): ICD-10-PCS | Mod: ,,, | Performed by: PSYCHIATRY & NEUROLOGY

## 2020-03-27 PROCEDURE — 25000003 PHARM REV CODE 250: Performed by: PSYCHIATRY & NEUROLOGY

## 2020-03-27 PROCEDURE — 99233 PR SUBSEQUENT HOSPITAL CARE,LEVL III: ICD-10-PCS | Mod: ,,, | Performed by: PSYCHIATRY & NEUROLOGY

## 2020-03-27 PROCEDURE — 99233 SBSQ HOSP IP/OBS HIGH 50: CPT | Mod: ,,, | Performed by: PSYCHIATRY & NEUROLOGY

## 2020-03-27 PROCEDURE — 11400000 HC PSYCH PRIVATE ROOM

## 2020-03-27 RX ORDER — OLANZAPINE 10 MG/1
20 TABLET ORAL NIGHTLY
Status: DISCONTINUED | OUTPATIENT
Start: 2020-03-27 | End: 2020-03-28

## 2020-03-27 RX ADMIN — CARBAMAZEPINE 200 MG: 100 TABLET, EXTENDED RELEASE ORAL at 09:03

## 2020-03-27 RX ADMIN — INSULIN ASPART 1 UNITS: 100 INJECTION, SOLUTION INTRAVENOUS; SUBCUTANEOUS at 09:03

## 2020-03-27 RX ADMIN — SIMVASTATIN 20 MG: 10 TABLET, FILM COATED ORAL at 09:03

## 2020-03-27 RX ADMIN — SAXAGLIPTIN 5 MG: 2.5 TABLET, FILM COATED ORAL at 09:03

## 2020-03-27 RX ADMIN — OLANZAPINE 20 MG: 10 TABLET, FILM COATED ORAL at 09:03

## 2020-03-27 RX ADMIN — ASPIRIN 81 MG: 81 TABLET, COATED ORAL at 09:03

## 2020-03-27 RX ADMIN — METFORMIN HYDROCHLORIDE 1000 MG: 500 TABLET ORAL at 04:03

## 2020-03-27 RX ADMIN — THERA TABS 1 TABLET: TAB at 09:03

## 2020-03-27 RX ADMIN — METFORMIN HYDROCHLORIDE 1000 MG: 500 TABLET ORAL at 09:03

## 2020-03-27 RX ADMIN — Medication 1 TABLET: at 09:03

## 2020-03-27 NOTE — PLAN OF CARE
Lying quiet in bed, eyes closed, respiration even and unlabored, appearing asleep.  Slept well this shift till 0400 with one awakening at about 0015.  Was going to read while sitting up in bed but fell back to sleep till 0400.  Once awake tried to engage another pt to go sit at the end of darby A but the other pt was easily redirected to go back to bed but he still went and was quiet.  Pt waiting to take a bath and attempts to push that time up to earlier but firm limits set.   Safety and precautions maintained with rounds every 15 minutes, bed is fixed in a low position and pathways kept clear.  No fall occurred.

## 2020-03-27 NOTE — PROGRESS NOTES
"PSYCHIATRY DAILY INPATIENT PROGRESS NOTE  SUBSEQUENT HOSPITAL VISIT    ENCOUNTER DATE: 3/27/2020  [facility]    DATE OF ADMISSION: 3/24/2020 12:41 AM  LENGTH OF STAY: 3 days    HISTORY    CHIEF COMPLAINT   Chau Lovett is a 59 y.o. male, seen during daily contreras rounds on the inpatient unit.  Chau Lovett presents with the chief complaint of psychosis/hallucinations, "I have been falling."    HPI   (Elements: Location, Quality, Severity, Duration, Timing, Content, Modifying Factors, Associated Signs & Symptoms)    The patient was seen and examined. The chart was reviewed.     Reviewed notes by LMSW, CTRS, Speciality Services, and RN from the last 24 hours.    The patient's case was discussed with the treatment team and care providers today, including RN, CTRS, LMSW and Specialty Services.    Staff reports no behavioral or management issues.     The patient has been compliant with treatment. The patient denied any side effects.    The patient continues to lack insight into his illness. He denies all psychiatric symptoms, despite ongoing symtpoms of psychosis/craig which remain fx/bx impairing. He discussed how he "tries to touch the sun.. I can do whatever I want." He attributes this to his craig. He had a "disagreement" with staff last night about wanting to shower outside of the normal showering times.     Denied Symptoms of Depression: no diminished mood or loss of interest/anhedonia; no irritability, diminished energy, change in sleep, change in appetite, diminished concentration or cognition or indecisiveness, PMA/R, excessive guilt or hopelessness or worthlessness, or suicidal ideations     Continued Changes in Sleep: +trouble with initiation/maintenance, no early morning awakening with inability to return to sleep; +diminshed need for sleep; pt slept 5 hours last night     Denied Suicidal/Homicidal ideations: no active/passive ideations, organized plans, or future " "intentions     Continued Symptoms of psychosis: +hallucinations, no delusions, no disorganized thinking, no disorganized behavior or abnormal motor behavior, no negative symptoms      Continued Symptoms of craig or hypomania: no elevated, no expansive, or +irritable mood with +increased energy or activity; with no inflated self-esteem or grandiosity, +decreased need for sleep, no increased rate of speech, no FOI or racing thoughts, +distractibility, no increased goal directed activity or PMA, +risky/disinhibited behavior    Denied Symptoms of Anxiety: no excessive anxiety/worry/fear, no panic attacks; without agoraphobia    PSYCHOTHERAPY ADD-ON +40727   30 (16-37*) minutes    Time: 16 minutes  Participants: Met with patient    Therapeutic Intervention Type: behavior modifying psychotherapy, supportive psychotherapy  Why chosen therapy is appropriate versus another modality: relevant to diagnosis, patient responds to this modality, evidence based practice    Target symptoms: mood disorder, psychosis  Primary focus: psychosis, stressors, compliance  Psychotherapeutic techniques: supportive and psycho-dynamic techniques; psycho-education; motivational techniques for compliance; managing "impaitence."     Outcome monitoring methods: self-report, observation    Patient's response to intervention:  The patient's response to intervention is accepting.    Progress toward goals:  The patient's progress toward goals is limited.        ROS  General ROS: negative  Ophthalmic ROS: negative  ENT ROS: negative  Allergy and Immunology ROS: negative  Hematological and Lymphatic ROS: negative  Endocrine ROS: negative  Respiratory ROS: no cough, shortness of breath, or wheezing  Cardiovascular ROS: no chest pain or dyspnea on exertion  Gastrointestinal ROS: no abdominal pain, change in bowel habits, or black or bloody stools  Genito-Urinary ROS: no dysuria, trouble voiding, or hematuria  Musculoskeletal ROS: negative  Neurological " "ROS: no TIA or stroke symptoms  Dermatological ROS: negative      PAST MEDICAL HISTORY   Past Medical History:   Diagnosis Date    Anxiety     Bipolar 1 disorder     Bradycardia     Depression     Diabetes mellitus     Hallucination     History of psychiatric hospitalization     [facility] 2/2020 and [facility] 3/2020     of psychiatric care     Hypertension     Jacinta     Nuclear sclerosis of both eyes 10/22/2015    Psychiatric exam requested by authority     Psychiatric problem     Psychosis     Renal disorder     Schizoaffective disorder     Sleep difficulties     Therapy     Thyroid disease     possible           PSYCHOTROPIC MEDICATIONS   Scheduled Meds:   aspirin  81 mg Oral Daily    carBAMazepine  200 mg Oral BID    folic acid-vit B6-vit B12 2.5-25-2 mg  1 tablet Oral Daily    metFORMIN  1,000 mg Oral BID WM    multivitamin  1 tablet Oral Daily    OLANZapine  15 mg Oral QHS    SAXagliptin  5 mg Oral Daily    simvastatin  20 mg Oral QHS     Continuous Infusions:  PRN Meds:.acetaminophen, aluminum-magnesium hydroxide-simethicone, docusate sodium, glucagon (human recombinant), glucose, glucose, glucose, hydrOXYzine pamoate, insulin aspart U-100, loperamide, OLANZapine **AND** OLANZapine        EXAMINATION    VITALS   Vitals:    03/27/20 0743   BP: (!) 140/65   Pulse: 97   Resp: 18   Temp: 96.7 °F (35.9 °C)     Body mass index is 28.72 kg/m².      CONSTITUTIONAL  General Appearance: , in hospital garb, overweight; NAD     MUSCULOSKELETAL  Muscle Strength and Tone:  normal  Abnormal Involuntary Movements:  None- no hand tremor today  Gait and Station:  normal; mildly ataxic     PSYCHIATRIC   Level of Consciousness: awake, alert  Orientation: p/p/t/s  Grooming:  adequate to circumstances  Psychomotor Behavior: no PMA/R  Speech: nl r/t/v/s  Language:  English fluent  Mood: "ok"  Affect: less decreased range, less irritable, oppositional at times  Thought Process: mostly linear and " organized; racing thoughts atimes  Associations:  intact; no CARLEY  Thought Content:  denied AVH/delusions; denied HI/SI  Memory:  intact to recent and remote events  Attention: mostly intact to conversation; +somewhat distractible   Fund of Knowledge:  age and education appropriate  Estimate if Intelligence:  average based on work/education history, vocabulary and mental status exam  Insight:  fair- partially seeks help, understands/accepts illness  Judgment:   good- no bx issues, compliant and cooperative        DIAGNOSTIC TESTING   Laboratory Results  Recent Results (from the past 24 hour(s))   POCT glucose    Collection Time: 03/26/20 12:02 PM   Result Value Ref Range    POCT Glucose 208 (H) 70 - 110 mg/dL   POCT glucose    Collection Time: 03/26/20  4:53 PM   Result Value Ref Range    POCT Glucose 256 (H) 70 - 110 mg/dL   POCT glucose    Collection Time: 03/26/20  7:57 PM   Result Value Ref Range    POCT Glucose 190 (H) 70 - 110 mg/dL   POCT glucose    Collection Time: 03/27/20  7:47 AM   Result Value Ref Range    POCT Glucose 178 (H) 70 - 110 mg/dL         ASSESSMENT      IMPRESSION   Bipolar I Disorder MRE manic, severe with psychotic features     EPS     Psychosocial stressors     Vitamin D insufficiency  B12 deficiency      Acute renal insufficiency  Hyponatremia  Abnormal TSH  Mixed hyperlipidemia  Uncontrolled type 2 diabetes mellitus with hyperglycemia, without long-term current use of insulin  Essential hypertension     MEDICAL DECISION MAKING       PROBLEM LIST AND MANAGEMENT PLANS; PRESCRIPTION DRUG MANAGEMENT  Compliance: yes  Side Effects: no  Regimen Adjustments:      Bipolar I Disorder mre manic, severe with psychotic features: pt counseled  -Start re-trial of Tegretol CR at 100 mg po BID- increase to 200 mg po BID (last known stabilizing dosage)- check level in 2 days; will titrate as indicated  -Start re-trial of Zyprexa 5 mg po q HS- increase to 10 mg po q HS- increase to 15 mg po q HS- increase  to 20 mg po q HS starting tonight (pt last on 25 mg nightly)  -we discussed his medication options in depth; the above medications were the only ones he was willing to try at this time     EPS: pt counseled  -continue to monitor for now; pt declining pharmacotherapy  -consider trial of cogentin     Psychosocial stressors: Pt counseled  -SW consulted and assisting with resources     Vitamin D insufficiency: Pt counseled  -Started/continue Vitamin D3 50,000 units po q week x 8 weeks (1/8 given)     B12 deficiency:  Pt counseled  -Give B12 1000 mcg IM x 1 on 3/24/20  -Start/continue Folbic po q day     Acute renal insufficiency: Pt counseled  -likely from poor PO intake   -improved/resolved     Hyponatremia: pt counseled  -recheck and continue to monitor     Abnormal TSH:pt counseled  -recheck and continue to monitor     Mixed hyperlipidemia: pt counseled  Resume/continue simvastatin 20 mg po q day     Uncontrolled type 2 diabetes mellitus with hyperglycemia, without long-term current use of insulin: pt counseled  -resumed/continue metformin 1000 mg po BID with meals and Saxagliptim 5 mg po q day     Essential hypertension: pt counseled  Hold lisinopril for now; monitor BP closely and resume if needed        DIAGNOSTIC TESTING  Labs reviewed with patient; follow up pending labs; check tegretol, CBC, CMP and TSH in 1 days     Disposition:  -SW to assist with aftercare planning and collateral  -Once stable discharge home with outpatient follow up care and/or rehab  -Continue inpatient treatment under a PEC and/or [facility] for grave disability as evident by fx/bx impairing mood and psychotic symptoms    NEED FOR CONTINUED HOSPITALIZATION  Psychiatric illness continues to pose a potential threat to life or bodily function, of self or others, thereby requiring the need for continued inpatient psychiatric hospitalization: Yes    Protective inpatient pyschiatric hospitalization required while a safe disposition plan is enacted:  Yes    Patient stabilized and ready for discharge from inpatient psychiatric unit: No      STAFF:   Bao Naranjo MD  Psychiatry

## 2020-03-27 NOTE — PLAN OF CARE
Accepts meals and medications without difficulty. Calm and cooperative. Interacting well with staff and select peers. Reports slept well last night. Mood improved. Denies SI, HI and AVH.

## 2020-03-27 NOTE — PLAN OF CARE
"Plan of care reviewed.  Denies intent to harm self or others at this time and no overt psychosis noted.  Accepts medications.  Gait steady, no falls.  Interacting with staff and peers. Did get upset about not being 1st on the shower list and became augmentative with the MHT's.  Baldo walked to room and just announced he wasn't going to shower.  "I don't need to shower."  Told him that was his choice and when attempting to process with him stated "It's over."  Returned to dayroom  and only apologized to the male MHT.  Promoted an individualized safety plan, reality-based interactions, effective coping strategies, and impulse control.  Will continue to monitor for safety.        "

## 2020-03-27 NOTE — PLAN OF CARE
"  Problem: Adult Behavioral Health Plan of Care  Goal: Optimized Coping Skills in Response to Life Stressors  Intervention: Promote Effective Coping Strategies  Flowsheets (Taken 3/27/2020 1115)  Supportive Measures: positive reinforcement provided; active listening utilized; verbalization of feelings encouraged; counseling provided; decision-making supported; problem solving facilitated; relaxation techniques promoted; goal setting facilitated; self-care encouraged; self-reflection promoted; self-responsibility promoted; journaling promoted     Behavior: Patient attended psychotherapeutic group dressed in personal clothing, appropriate grooming with congruent affect and happy mood, attentive posture, and appropriate eye contact. Patient remained engaged and attentive.    Intervention:  will engage patients in a CBT based, goal oriented wellness group to discuss cognitive distortions and ways of recognizing maladaptive thinking patterns.  will engage patient in exercise to help them recognize the distortions and identifying ways to combat those thoughts. Patients will be given time to express thoughts, concerns and goals for treatment and discharge, as well as perceived barriers to progress.    Response: Patient continues to have minimal insight into his behaviors or presentation. He says "I am a perfectionist and I think positive" as answers to most questions.     Plan:  will continue to encourage patient to explore and verbalize emotions, set goals to aid in preventing re-hospitalization, attend psychotherapeutic group, and follow up with aftercare appointments.  "

## 2020-03-27 NOTE — PROGRESS NOTES
"   03/27/20 1040   [facility] Group Therapy   Group Name Therapeutic Recreation   Specific Interventions Coping Skills Training   Participation Level Active;Appropriate;Attentive   Participation Quality Cooperative;Social   Insight/Motivation Applies New Skills;Good   Affect/Mood Display Appropriate   Cognition Alert   Psychomotor WNL   Patient shared "I'm a very selfish person. I'm a perfectionist. If it doesn't work. I'll keep trying until I get it right. I want to improve patience." Patient completed task using problem solving skills, matching skills and concentration.  "

## 2020-03-28 LAB
ALBUMIN SERPL BCP-MCNC: 3.6 G/DL (ref 3.5–5.2)
ALP SERPL-CCNC: 106 U/L (ref 55–135)
ALT SERPL W/O P-5'-P-CCNC: 21 U/L (ref 10–44)
ANION GAP SERPL CALC-SCNC: 12 MMOL/L (ref 8–16)
AST SERPL-CCNC: 26 U/L (ref 10–40)
BASOPHILS # BLD AUTO: 0 K/UL (ref 0–0.2)
BASOPHILS NFR BLD: 0 % (ref 0–1.9)
BILIRUB SERPL-MCNC: 0.3 MG/DL (ref 0.1–1)
BUN SERPL-MCNC: 11 MG/DL (ref 6–20)
CALCIUM SERPL-MCNC: 8.7 MG/DL (ref 8.7–10.5)
CARBAMAZEPINE SERPL-MCNC: 6.7 UG/ML (ref 4–12)
CHLORIDE SERPL-SCNC: 101 MMOL/L (ref 95–110)
CO2 SERPL-SCNC: 23 MMOL/L (ref 23–29)
CREAT SERPL-MCNC: 0.9 MG/DL (ref 0.5–1.4)
DIFFERENTIAL METHOD: ABNORMAL
EOSINOPHIL # BLD AUTO: 0 K/UL (ref 0–0.5)
EOSINOPHIL NFR BLD: 0.4 % (ref 0–8)
ERYTHROCYTE [DISTWIDTH] IN BLOOD BY AUTOMATED COUNT: 12.9 % (ref 11.5–14.5)
EST. GFR  (AFRICAN AMERICAN): >60 ML/MIN/1.73 M^2
EST. GFR  (NON AFRICAN AMERICAN): >60 ML/MIN/1.73 M^2
GLUCOSE SERPL-MCNC: 197 MG/DL (ref 70–110)
HCT VFR BLD AUTO: 37.1 % (ref 40–54)
HGB BLD-MCNC: 12.3 G/DL (ref 14–18)
IMM GRANULOCYTES # BLD AUTO: 0.02 K/UL (ref 0–0.04)
IMM GRANULOCYTES NFR BLD AUTO: 0.4 % (ref 0–0.5)
LYMPHOCYTES # BLD AUTO: 1 K/UL (ref 1–4.8)
LYMPHOCYTES NFR BLD: 19.4 % (ref 18–48)
MCH RBC QN AUTO: 30.4 PG (ref 27–31)
MCHC RBC AUTO-ENTMCNC: 33.2 G/DL (ref 32–36)
MCV RBC AUTO: 92 FL (ref 82–98)
MONOCYTES # BLD AUTO: 0.6 K/UL (ref 0.3–1)
MONOCYTES NFR BLD: 11.9 % (ref 4–15)
NEUTROPHILS # BLD AUTO: 3.4 K/UL (ref 1.8–7.7)
NEUTROPHILS NFR BLD: 67.9 % (ref 38–73)
NRBC BLD-RTO: 0 /100 WBC
PLATELET # BLD AUTO: 230 K/UL (ref 150–350)
PMV BLD AUTO: 10.2 FL (ref 9.2–12.9)
POCT GLUCOSE: 147 MG/DL (ref 70–110)
POCT GLUCOSE: 163 MG/DL (ref 70–110)
POCT GLUCOSE: 176 MG/DL (ref 70–110)
POCT GLUCOSE: 207 MG/DL (ref 70–110)
POTASSIUM SERPL-SCNC: 4.2 MMOL/L (ref 3.5–5.1)
PROT SERPL-MCNC: 7.5 G/DL (ref 6–8.4)
RBC # BLD AUTO: 4.05 M/UL (ref 4.6–6.2)
SODIUM SERPL-SCNC: 136 MMOL/L (ref 136–145)
WBC # BLD AUTO: 5.04 K/UL (ref 3.9–12.7)

## 2020-03-28 PROCEDURE — 99233 PR SUBSEQUENT HOSPITAL CARE,LEVL III: ICD-10-PCS | Mod: S$PBB,,, | Performed by: PSYCHIATRY & NEUROLOGY

## 2020-03-28 PROCEDURE — 85025 COMPLETE CBC W/AUTO DIFF WBC: CPT

## 2020-03-28 PROCEDURE — 80156 ASSAY CARBAMAZEPINE TOTAL: CPT

## 2020-03-28 PROCEDURE — 80053 COMPREHEN METABOLIC PANEL: CPT

## 2020-03-28 PROCEDURE — 25000003 PHARM REV CODE 250: Performed by: PSYCHIATRY & NEUROLOGY

## 2020-03-28 PROCEDURE — 99233 SBSQ HOSP IP/OBS HIGH 50: CPT | Mod: S$PBB,,, | Performed by: PSYCHIATRY & NEUROLOGY

## 2020-03-28 PROCEDURE — 11400000 HC PSYCH PRIVATE ROOM

## 2020-03-28 PROCEDURE — 90833 PR PSYCHOTHERAPY W/PATIENT W/E&M, 30 MIN (ADD ON): ICD-10-PCS | Mod: S$PBB,,, | Performed by: PSYCHIATRY & NEUROLOGY

## 2020-03-28 PROCEDURE — 36415 COLL VENOUS BLD VENIPUNCTURE: CPT

## 2020-03-28 PROCEDURE — 90833 PSYTX W PT W E/M 30 MIN: CPT | Mod: S$PBB,,, | Performed by: PSYCHIATRY & NEUROLOGY

## 2020-03-28 RX ADMIN — SAXAGLIPTIN 5 MG: 2.5 TABLET, FILM COATED ORAL at 08:03

## 2020-03-28 RX ADMIN — OLANZAPINE 25 MG: 5 TABLET, FILM COATED ORAL at 08:03

## 2020-03-28 RX ADMIN — CARBAMAZEPINE 200 MG: 100 TABLET, EXTENDED RELEASE ORAL at 08:03

## 2020-03-28 RX ADMIN — METFORMIN HYDROCHLORIDE 1000 MG: 500 TABLET ORAL at 08:03

## 2020-03-28 RX ADMIN — Medication 1 TABLET: at 08:03

## 2020-03-28 RX ADMIN — SIMVASTATIN 20 MG: 10 TABLET, FILM COATED ORAL at 08:03

## 2020-03-28 RX ADMIN — THERA TABS 1 TABLET: TAB at 08:03

## 2020-03-28 RX ADMIN — ASPIRIN 81 MG: 81 TABLET, COATED ORAL at 08:03

## 2020-03-28 RX ADMIN — METFORMIN HYDROCHLORIDE 1000 MG: 500 TABLET ORAL at 04:03

## 2020-03-28 RX ADMIN — INSULIN ASPART 1 UNITS: 100 INJECTION, SOLUTION INTRAVENOUS; SUBCUTANEOUS at 08:03

## 2020-03-28 NOTE — PROGRESS NOTES
"PSYCHIATRY DAILY INPATIENT PROGRESS NOTE  SUBSEQUENT HOSPITAL VISIT    ENCOUNTER DATE: 3/28/2020  SITE[facility]    DATE OF ADMISSION: 3/24/2020 12:41 AM  LENGTH OF STAY: 4 days    HISTORY    CHIEF COMPLAINT   Chau Lovett is a 59 y.o. male, seen during daily contreras rounds on the inpatient unit.  Chau Lovett presents with the chief complaint of psychosis/hallucinations, "I have been falling."    HPI   (Elements: Location, Quality, Severity, Duration, Timing, Content, Modifying Factors, Associated Signs & Symptoms)    The patient was seen and examined. The chart was reviewed.     Reviewed notes by LMSW, CTRS, LPN, and RN from the last 24 hours.    The patient's case was discussed with the treatment team and care providers today, including RNs.    Staff reports no behavioral or management issues.     The patient has been compliant with treatment. The patient denied any side effects.    The patient continues to lack insight into his illness. He denies all psychiatric symptoms, despite ongoing symtpoms of psychosis/craig which remain fx/bx impairing. He discussed how he "tries to touch the sun.. I can do whatever I want." He reports that his medicines are helpful and he would like to attend an IOP post discharge.     His thought process is concrete and distractible, which limits treatment/therapy efficacy a this time.      Denied Symptoms of Depression: no diminished mood or loss of interest/anhedonia; no irritability, diminished energy, change in sleep, change in appetite, diminished concentration or cognition or indecisiveness, PMA/R, excessive guilt or hopelessness or worthlessness, or suicidal ideations     Continued Changes in Sleep: +trouble with initiation/maintenance, no early morning awakening with inability to return to sleep; +diminshed need for sleep; pt slept 3 hours last night     Denied Suicidal/Homicidal ideations: no active/passive ideations, organized plans, or future " intentions     Continued Symptoms of psychosis: +hallucinations, no delusions, no disorganized thinking, no disorganized behavior or abnormal motor behavior, no negative symptoms      Continued but less Symptoms of craig or hypomania: no elevated, no expansive, or less irritable mood with less increased energy or activity; with no inflated self-esteem or grandiosity, +decreased need for sleep, no increased rate of speech, no FOI or racing thoughts, +distractibility, no increased goal directed activity or PMA, +risky/disinhibited behavior    Denied Symptoms of Anxiety: no excessive anxiety/worry/fear, no panic attacks; without agoraphobia    PSYCHOTHERAPY ADD-ON +36211   30 (16-37*) minutes    Time: 20 minutes  Participants: Met with patient    Therapeutic Intervention Type: behavior modifying psychotherapy, supportive psychotherapy  Why chosen therapy is appropriate versus another modality: relevant to diagnosis, patient responds to this modality, evidence based practice    Target symptoms: mood disorder, psychosis  Primary focus: psychosis, stressors, compliance  Psychotherapeutic techniques: supportive and psycho-dynamic techniques; psycho-education; motivational techniques for compliance; self-redirection    Outcome monitoring methods: self-report, observation    Patient's response to intervention:  The patient's response to intervention is accepting.    Progress toward goals:  The patient's progress toward goals is limited.        ROS  General ROS: negative  Ophthalmic ROS: negative  ENT ROS: negative  Allergy and Immunology ROS: negative  Hematological and Lymphatic ROS: negative  Endocrine ROS: negative  Respiratory ROS: no cough, shortness of breath, or wheezing  Cardiovascular ROS: no chest pain or dyspnea on exertion  Gastrointestinal ROS: no abdominal pain, change in bowel habits, or black or bloody stools  Genito-Urinary ROS: no dysuria, trouble voiding, or hematuria  Musculoskeletal ROS:  "negative  Neurological ROS: no TIA or stroke symptoms  Dermatological ROS: negative      PAST MEDICAL HISTORY   Past Medical History:   Diagnosis Date    Anxiety     Bipolar 1 disorder     Bradycardia     Depression     Diabetes mellitus     Hallucination     History of psychiatric hospitalization     [place] 2/2020 and [facility] 3/[facility]     of psychiatric care     Hypertension     Jacinta     Nuclear sclerosis of both eyes 10/22/2015    Psychiatric exam requested by authority     Psychiatric problem     Psychosis     Renal disorder     Schizoaffective disorder     Sleep difficulties     Therapy     Thyroid disease     possible           PSYCHOTROPIC MEDICATIONS   Scheduled Meds:   aspirin  81 mg Oral Daily    carBAMazepine  200 mg Oral BID    folic acid-vit B6-vit B12 2.5-25-2 mg  1 tablet Oral Daily    metFORMIN  1,000 mg Oral BID WM    multivitamin  1 tablet Oral Daily    OLANZapine  20 mg Oral QHS    SAXagliptin  5 mg Oral Daily    simvastatin  20 mg Oral QHS     Continuous Infusions:  PRN Meds:.acetaminophen, aluminum-magnesium hydroxide-simethicone, docusate sodium, glucagon (human recombinant), glucose, glucose, glucose, hydrOXYzine pamoate, insulin aspart U-100, loperamide, OLANZapine **AND** OLANZapine        EXAMINATION    VITALS   Vitals:    03/28/20 0740   BP: 137/60   Pulse: 92   Resp: 18   Temp: 98.7 °F (37.1 °C)     Body mass index is 28.72 kg/m².      CONSTITUTIONAL  General Appearance: , in hospital garb, overweight; NAD     MUSCULOSKELETAL  Muscle Strength and Tone:  normal  Abnormal Involuntary Movements:  None- no hand tremor today  Gait and Station:  normal; mildly ataxic     PSYCHIATRIC   Level of Consciousness: awake, alert  Orientation: p/p/t/s  Grooming:  adequate to circumstances  Psychomotor Behavior: no PMA/R  Speech: nl r/t/v/s  Language:  English fluent  Mood: "alright"  Affect: less decreased range, less irritable, less oppositional at times  Thought " Process: mostly linear and organized; racing thoughts at times; derailed at times  Associations:  intact; no CARLEY  Thought Content:  denied AVH/delusions; denied HI/SI  Memory:  intact to recent and remote events  Attention: mostly intact to conversation; +somewhat distractible   Fund of Knowledge:  age and education appropriate  Estimate if Intelligence:  average based on work/education history, vocabulary and mental status exam  Insight:  fair- partially seeks help, understands/accepts illness  Judgment:   good- no bx issues, compliant and cooperative        DIAGNOSTIC TESTING   Laboratory Results  Recent Results (from the past 24 hour(s))   POCT glucose    Collection Time: 03/27/20 11:28 AM   Result Value Ref Range    POCT Glucose 177 (H) 70 - 110 mg/dL   POCT glucose    Collection Time: 03/27/20  4:45 PM   Result Value Ref Range    POCT Glucose 164 (H) 70 - 110 mg/dL   POCT glucose    Collection Time: 03/27/20  9:20 PM   Result Value Ref Range    POCT Glucose 216 (H) 70 - 110 mg/dL   CBC auto differential    Collection Time: 03/28/20  6:33 AM   Result Value Ref Range    WBC 5.04 3.90 - 12.70 K/uL    RBC 4.05 (L) 4.60 - 6.20 M/uL    Hemoglobin 12.3 (L) 14.0 - 18.0 g/dL    Hematocrit 37.1 (L) 40.0 - 54.0 %    Mean Corpuscular Volume 92 82 - 98 fL    Mean Corpuscular Hemoglobin 30.4 27.0 - 31.0 pg    Mean Corpuscular Hemoglobin Conc 33.2 32.0 - 36.0 g/dL    RDW 12.9 11.5 - 14.5 %    Platelets 230 150 - 350 K/uL    MPV 10.2 9.2 - 12.9 fL    Immature Granulocytes 0.4 0.0 - 0.5 %    Gran # (ANC) 3.4 1.8 - 7.7 K/uL    Immature Grans (Abs) 0.02 0.00 - 0.04 K/uL    Lymph # 1.0 1.0 - 4.8 K/uL    Mono # 0.6 0.3 - 1.0 K/uL    Eos # 0.0 0.0 - 0.5 K/uL    Baso # 0.00 0.00 - 0.20 K/uL    nRBC 0 0 /100 WBC    Gran% 67.9 38.0 - 73.0 %    Lymph% 19.4 18.0 - 48.0 %    Mono% 11.9 4.0 - 15.0 %    Eosinophil% 0.4 0.0 - 8.0 %    Basophil% 0.0 0.0 - 1.9 %    Differential Method Automated    Comprehensive metabolic panel    Collection  Time: 03/28/20  6:33 AM   Result Value Ref Range    Sodium 136 136 - 145 mmol/L    Potassium 4.2 3.5 - 5.1 mmol/L    Chloride 101 95 - 110 mmol/L    CO2 23 23 - 29 mmol/L    Glucose 197 (H) 70 - 110 mg/dL    BUN, Bld 11 6 - 20 mg/dL    Creatinine 0.9 0.5 - 1.4 mg/dL    Calcium 8.7 8.7 - 10.5 mg/dL    Total Protein 7.5 6.0 - 8.4 g/dL    Albumin 3.6 3.5 - 5.2 g/dL    Total Bilirubin 0.3 0.1 - 1.0 mg/dL    Alkaline Phosphatase 106 55 - 135 U/L    AST 26 10 - 40 U/L    ALT 21 10 - 44 U/L    Anion Gap 12 8 - 16 mmol/L    eGFR if African American >60 >60 mL/min/1.73 m^2    eGFR if non African American >60 >60 mL/min/1.73 m^2   POCT glucose    Collection Time: 03/28/20  7:57 AM   Result Value Ref Range    POCT Glucose 176 (H) 70 - 110 mg/dL         ASSESSMENT      IMPRESSION   Bipolar I Disorder MRE manic, severe with psychotic features     EPS     Psychosocial stressors     Vitamin D insufficiency  B12 deficiency      Acute renal insufficiency  Hyponatremia  Abnormal TSH  Mixed hyperlipidemia  Uncontrolled type 2 diabetes mellitus with hyperglycemia, without long-term current use of insulin  Essential hypertension     MEDICAL DECISION MAKING       PROBLEM LIST AND MANAGEMENT PLANS; PRESCRIPTION DRUG MANAGEMENT  Compliance: yes  Side Effects: no  Regimen Adjustments:      Bipolar I Disorder mre manic, severe with psychotic features: pt counseled  -Start re-trial of Tegretol CR at 100 mg po BID- increase to 200 mg po BID (last known stabilizing dosage)- check level today (results pending) will titrate as indicated  -Start re-trial of Zyprexa 5 mg po q HS- increase to 10 mg po q HS- increase to 15 mg po q HS- increase to 20 mg po q HS- increase to 25 mg po q HS starting tonight  -we discussed his medication options in depth; the above medications were the only ones he was willing to try at this time     EPS: pt counseled  -continue to monitor for now; pt declining pharmacotherapy  -consider trial of cogentin     Psychosocial  stressors: Pt counseled  -SW consulted and assisting with resources     Vitamin D insufficiency: Pt counseled  -Started/continue Vitamin D3 50,000 units po q week x 8 weeks (1/8 given)     B12 deficiency:  Pt counseled  -Give B12 1000 mcg IM x 1 on 3/24/20  -Start/continue Folbic po q day     Acute renal insufficiency: Pt counseled  -likely from poor PO intake   -improved/resolved     Hyponatremia: pt counseled  -recheckrf snf resolved     Abnormal TSH:pt counseled  -recheck and continue to monitor     Mixed hyperlipidemia: pt counseled  Resume/continue simvastatin 20 mg po q day     Uncontrolled type 2 diabetes mellitus with hyperglycemia, without long-term current use of insulin: pt counseled  -resumed/continue metformin 1000 mg po BID with meals and Saxagliptim 5 mg po q day     Essential hypertension: pt counseled  Hold lisinopril for now; monitor BP closely and resume if needed        DIAGNOSTIC TESTING  Labs reviewed with patient; follow up pending labs     Disposition:  -SW to assist with aftercare planning and collateral  -Once stable discharge home with outpatient follow up care and/or rehab  -Continue inpatient treatment under a PEC and/or CEC for grave disability as evident by fx/bx impairing mood and psychotic symptoms    NEED FOR CONTINUED HOSPITALIZATION  Psychiatric illness continues to pose a potential threat to life or bodily function, of self or others, thereby requiring the need for continued inpatient psychiatric hospitalization: Yes    Protective inpatient pyschiatric hospitalization required while a safe disposition plan is enacted: Yes    Patient stabilized and ready for discharge from inpatient psychiatric unit: No      STAFF:   Bao Naranjo MD  Psychiatry

## 2020-03-28 NOTE — PLAN OF CARE
Pt slept 3 hours with 4 interruptions.  Pt is awake at this time.  No complaints.  Pathways clear and bed is low.  Q 15 minute safety checks ongoing.  All precautions maintained.

## 2020-03-28 NOTE — PLAN OF CARE
Patient in dayroom most of shift, calm and cooperative, talking to peers. Taking medications as ordered. Patient has not needed prn insulin,. Promoted safety plan, effective coping skills, absence SI/HI will continue to monitor safety

## 2020-03-28 NOTE — PLAN OF CARE
Pt is calm and cooperative.  Denies any S/I or H/I at this time.  Mood still somewhat elevated.  Interacting well with others.  No complaints voiced at this time.  No acute distress apparent, will continue to monitor.

## 2020-03-29 LAB
POCT GLUCOSE: 183 MG/DL (ref 70–110)
POCT GLUCOSE: 197 MG/DL (ref 70–110)
POCT GLUCOSE: 209 MG/DL (ref 70–110)
POCT GLUCOSE: 222 MG/DL (ref 70–110)

## 2020-03-29 PROCEDURE — 99233 PR SUBSEQUENT HOSPITAL CARE,LEVL III: ICD-10-PCS | Mod: S$PBB,,, | Performed by: PSYCHIATRY & NEUROLOGY

## 2020-03-29 PROCEDURE — 11400000 HC PSYCH PRIVATE ROOM

## 2020-03-29 PROCEDURE — 25000003 PHARM REV CODE 250: Performed by: PSYCHIATRY & NEUROLOGY

## 2020-03-29 PROCEDURE — 99233 SBSQ HOSP IP/OBS HIGH 50: CPT | Mod: S$PBB,,, | Performed by: PSYCHIATRY & NEUROLOGY

## 2020-03-29 RX ORDER — CARBAMAZEPINE 100 MG/1
300 TABLET, EXTENDED RELEASE ORAL 2 TIMES DAILY
Status: DISCONTINUED | OUTPATIENT
Start: 2020-03-29 | End: 2020-04-03

## 2020-03-29 RX ORDER — OLANZAPINE 10 MG/1
30 TABLET ORAL NIGHTLY
Status: DISCONTINUED | OUTPATIENT
Start: 2020-03-29 | End: 2020-03-30

## 2020-03-29 RX ADMIN — SIMVASTATIN 20 MG: 10 TABLET, FILM COATED ORAL at 08:03

## 2020-03-29 RX ADMIN — THERA TABS 1 TABLET: TAB at 08:03

## 2020-03-29 RX ADMIN — METFORMIN HYDROCHLORIDE 1000 MG: 500 TABLET ORAL at 04:03

## 2020-03-29 RX ADMIN — ASPIRIN 81 MG: 81 TABLET, COATED ORAL at 08:03

## 2020-03-29 RX ADMIN — CARBAMAZEPINE 300 MG: 100 TABLET, EXTENDED RELEASE ORAL at 08:03

## 2020-03-29 RX ADMIN — SAXAGLIPTIN 5 MG: 2.5 TABLET, FILM COATED ORAL at 08:03

## 2020-03-29 RX ADMIN — HYDROXYZINE PAMOATE 50 MG: 50 CAPSULE ORAL at 01:03

## 2020-03-29 RX ADMIN — CARBAMAZEPINE 200 MG: 100 TABLET, EXTENDED RELEASE ORAL at 08:03

## 2020-03-29 RX ADMIN — OLANZAPINE 30 MG: 10 TABLET, FILM COATED ORAL at 08:03

## 2020-03-29 RX ADMIN — METFORMIN HYDROCHLORIDE 1000 MG: 500 TABLET ORAL at 08:03

## 2020-03-29 RX ADMIN — Medication 1 TABLET: at 08:03

## 2020-03-29 NOTE — PLAN OF CARE
Pt is cooperative and interacting well with others.  Compliant with treatment thus far.  Still somewhat restless and slightly elevated but mood is improving.  Denies any S/I or H/I at this time.  No acute distress apparent at this time, will continue to monitor.

## 2020-03-29 NOTE — PLAN OF CARE
Out and about the unit.  Spending the evening in the dayroom watching tv, eating snack and talking with peers.  Interacting appropriately with peers and staff.  Talkative. Calm and cooperative.  Compliant with unit rules and meds.  Denies SI at this time.  Good appetite.  Fair hygiene.  Accucheck this evening was 207.  All precautions maintained.

## 2020-03-29 NOTE — PLAN OF CARE
Pt has slept 4.5 hours so far with two interruptions.  NAD.  Resp even & unlabored.  Pathways clear and bed is low.  Q 15 minute safety checks ongoing.  All precautions maintained.

## 2020-03-29 NOTE — PROGRESS NOTES
"PSYCHIATRY DAILY INPATIENT PROGRESS NOTE  SUBSEQUENT HOSPITAL VISIT    ENCOUNTER DATE: 3/29/2020  [facility]    DATE OF ADMISSION: 3/24/2020 12:41 AM  LENGTH OF STAY: 5 days    HISTORY    CHIEF COMPLAINT   Chau Lovett is a 59 y.o. male, seen during daily contreras rounds on the inpatient unit.  Chau Lovett presents with the chief complaint of psychosis/hallucinations, "I have been falling."    HPI   (Elements: Location, Quality, Severity, Duration, Timing, Content, Modifying Factors, Associated Signs & Symptoms)    The patient was seen and examined. The chart was reviewed.     Reviewed notes by LPN and RN from the last 24 hours.    The patient's case was discussed with the treatment team and care providers today, including RNs.    Staff reports no behavioral or management issues.     The patient has been compliant with treatment. The patient denied any side effects.    The patient continues to lack insight into his illness. He denies all psychiatric symptoms, despite ongoing symtpoms of psychosis/craig which remain fx/bx impairing. He discussed how he "tries to touch the sun.. I can do whatever I want." He reports that his medicines are helpful and he would like to attend an IOP post discharge.     His thought process is concrete and distractible, which limits treatment/therapy efficacy a this time.  He is pleasant and having no bx issues at this time.     Denied Symptoms of Depression: no diminished mood or loss of interest/anhedonia; no irritability, diminished energy, change in sleep, change in appetite, diminished concentration or cognition or indecisiveness, PMA/R, excessive guilt or hopelessness or worthlessness, or suicidal ideations     Continued Changes in Sleep: +trouble with initiation/maintenance, no early morning awakening with inability to return to sleep; +diminshed need for sleep; pt slept 3 hours last night     Denied Suicidal/Homicidal ideations: no active/passive " ideations, organized plans, or future intentions     Continued Symptoms of psychosis: +hallucinations, no delusions, no disorganized thinking, no disorganized behavior or abnormal motor behavior, no negative symptoms      Continued but less Symptoms of craig or hypomania: no elevated, no expansive, or less irritable mood with less increased energy or activity; with no inflated self-esteem or grandiosity, +decreased need for sleep, no increased rate of speech, no FOI or racing thoughts, less distractibility, no increased goal directed activity or PMA, less risky/disinhibited behavior    Denied Symptoms of Anxiety: no excessive anxiety/worry/fear, no panic attacks; without agoraphobia    PSYCHOTHERAPY ADD-ON +64711   30 (16-37*) minutes    Time: 20 minutes  Participants: Met with patient    Therapeutic Intervention Type: behavior modifying psychotherapy, supportive psychotherapy  Why chosen therapy is appropriate versus another modality: relevant to diagnosis, patient responds to this modality, evidence based practice    Target symptoms: mood disorder, psychosis  Primary focus: psychosis, stressors, compliance  Psychotherapeutic techniques: supportive and psycho-dynamic techniques; psycho-education; motivational techniques for compliance; self-redirection    Outcome monitoring methods: self-report, observation    Patient's response to intervention:  The patient's response to intervention is accepting.    Progress toward goals:  The patient's progress toward goals is limited.      ROS  General ROS: negative  Ophthalmic ROS: negative  ENT ROS: negative  Allergy and Immunology ROS: negative  Hematological and Lymphatic ROS: negative  Endocrine ROS: negative  Respiratory ROS: no cough, shortness of breath, or wheezing  Cardiovascular ROS: no chest pain or dyspnea on exertion  Gastrointestinal ROS: no abdominal pain, change in bowel habits, or black or bloody stools  Genito-Urinary ROS: no dysuria, trouble voiding, or  "hematuria  Musculoskeletal ROS: negative  Neurological ROS: no TIA or stroke symptoms  Dermatological ROS: negative      PAST MEDICAL HISTORY   Past Medical History:   Diagnosis Date    Anxiety     Bipolar 1 disorder     Bradycardia     Depression     Diabetes mellitus     Hallucination     History of psychiatric hospitalization     [place] 2/2020 and [facility] 3/[facility]     of psychiatric care     Hypertension     Jacinta     Nuclear sclerosis of both eyes 10/22/2015    Psychiatric exam requested by authority     Psychiatric problem     Psychosis     Renal disorder     Schizoaffective disorder     Sleep difficulties     Therapy     Thyroid disease     possible           PSYCHOTROPIC MEDICATIONS   Scheduled Meds:   aspirin  81 mg Oral Daily    carBAMazepine  200 mg Oral BID    folic acid-vit B6-vit B12 2.5-25-2 mg  1 tablet Oral Daily    metFORMIN  1,000 mg Oral BID WM    multivitamin  1 tablet Oral Daily    OLANZapine  25 mg Oral QHS    SAXagliptin  5 mg Oral Daily    simvastatin  20 mg Oral QHS     Continuous Infusions:  PRN Meds:.acetaminophen, aluminum-magnesium hydroxide-simethicone, docusate sodium, glucagon (human recombinant), glucose, glucose, glucose, hydrOXYzine pamoate, insulin aspart U-100, loperamide, OLANZapine **AND** OLANZapine        EXAMINATION    VITALS   Vitals:    03/29/20 0755   BP: 136/64   Pulse: 101   Resp: 18   Temp: 96.6 °F (35.9 °C)     Body mass index is 28.56 kg/m².      CONSTITUTIONAL  General Appearance: , in hospital garb, overweight; NAD     MUSCULOSKELETAL  Muscle Strength and Tone:  normal  Abnormal Involuntary Movements:  None- no hand tremor today  Gait and Station:  normal; mildly ataxic     PSYCHIATRIC   Level of Consciousness: awake, alert  Orientation: p/p/t/s  Grooming:  adequate to circumstances  Psychomotor Behavior: no PMA/R  Speech: nl r/t/v/s  Language:  English fluent  Mood: "alright"  Affect: less decreased range, less irritable, more " pleasant  Thought Process: mostly linear and organized; racing thoughts at times; derailed at times  Associations:  intact; no CARLEY  Thought Content:  denied AVH/delusions; denied HI/SI  Memory:  intact to recent and remote events  Attention: mostly intact to conversation; +somewhat distractible   Fund of Knowledge:  age and education appropriate  Estimate if Intelligence:  average based on work/education history, vocabulary and mental status exam  Insight:  fair- partially seeks help, understands/accepts illness  Judgment:   good- no bx issues, compliant and cooperative        DIAGNOSTIC TESTING   Laboratory Results  Recent Results (from the past 24 hour(s))   POCT glucose    Collection Time: 03/28/20 11:22 AM   Result Value Ref Range    POCT Glucose 147 (H) 70 - 110 mg/dL   POCT glucose    Collection Time: 03/28/20  4:42 PM   Result Value Ref Range    POCT Glucose 163 (H) 70 - 110 mg/dL   POCT glucose    Collection Time: 03/28/20  8:15 PM   Result Value Ref Range    POCT Glucose 207 (H) 70 - 110 mg/dL   POCT glucose    Collection Time: 03/29/20  7:39 AM   Result Value Ref Range    POCT Glucose 197 (H) 70 - 110 mg/dL         ASSESSMENT      IMPRESSION   Bipolar I Disorder MRE manic, severe with psychotic features     EPS     Psychosocial stressors     Vitamin D insufficiency  B12 deficiency      Acute renal insufficiency  Hyponatremia  Abnormal TSH  Mixed hyperlipidemia  Uncontrolled type 2 diabetes mellitus with hyperglycemia, without long-term current use of insulin  Essential hypertension     MEDICAL DECISION MAKING       PROBLEM LIST AND MANAGEMENT PLANS; PRESCRIPTION DRUG MANAGEMENT  Compliance: yes  Side Effects: no  Regimen Adjustments:      Bipolar I Disorder mre manic, severe with psychotic features: pt counseled  -Start re-trial of Tegretol CR at 100 mg po BID- increase to 200 mg po BID (last known stabilizing dosage)- increase to 300 mg po BID; check level in 4 days  -Start re-trial of Zyprexa 5 mg po q  HS- increase to 10 mg po q HS- increase to 15 mg po q HS- increase to 20 mg po q HS- increase to 25 mg po q HS- increase to 30 mg po q HS starting tonight  -we discussed his medication options in depth; the above medications were the only ones he was willing to try at this time     EPS: pt counseled  -continue to monitor for now; pt declining pharmacotherapy  -consider trial of cogentin     Psychosocial stressors: Pt counseled  -SW consulted and assisting with resources     Vitamin D insufficiency: Pt counseled  -Started/continue Vitamin D3 50,000 units po q week x 8 weeks (1/8 given)     B12 deficiency:  Pt counseled  -Give B12 1000 mcg IM x 1 on 3/24/20  -Start/continue Folbic po q day     Acute renal insufficiency: Pt counseled  -likely from poor PO intake   -improved/resolved     Hyponatremia: pt counseled  -recheckrf snf resolved     Abnormal TSH:pt counseled  -recheck and continue to monitor     Mixed hyperlipidemia: pt counseled  Resume/continue simvastatin 20 mg po q day     Uncontrolled type 2 diabetes mellitus with hyperglycemia, without long-term current use of insulin: pt counseled  -resumed/continue metformin 1000 mg po BID with meals and Saxagliptim 5 mg po q day     Essential hypertension: pt counseled  Hold lisinopril for now; monitor BP closely and resume if needed        DIAGNOSTIC TESTING  Labs reviewed with patient; follow up pending labs     Disposition:  -SW to assist with aftercare planning and collateral  -Once stable discharge home with outpatient follow up care and/or rehab  -Continue inpatient treatment under a PEC and/or CEC for grave disability as evident by fx/bx impairing mood and psychotic symptoms    NEED FOR CONTINUED HOSPITALIZATION  Psychiatric illness continues to pose a potential threat to life or bodily function, of self or others, thereby requiring the need for continued inpatient psychiatric hospitalization: Yes    Protective inpatient pyschiatric hospitalization required  while a safe disposition plan is enacted: Yes    Patient stabilized and ready for discharge from inpatient psychiatric unit: No      STAFF:   Bao Naranjo MD  Psychiatry

## 2020-03-30 LAB
POCT GLUCOSE: 162 MG/DL (ref 70–110)
POCT GLUCOSE: 173 MG/DL (ref 70–110)
POCT GLUCOSE: 248 MG/DL (ref 70–110)

## 2020-03-30 PROCEDURE — 25000003 PHARM REV CODE 250: Performed by: PSYCHIATRY & NEUROLOGY

## 2020-03-30 PROCEDURE — 90833 PSYTX W PT W E/M 30 MIN: CPT | Mod: S$PBB,,, | Performed by: PSYCHIATRY & NEUROLOGY

## 2020-03-30 PROCEDURE — 99233 PR SUBSEQUENT HOSPITAL CARE,LEVL III: ICD-10-PCS | Mod: S$PBB,,, | Performed by: PSYCHIATRY & NEUROLOGY

## 2020-03-30 PROCEDURE — 11400000 HC PSYCH PRIVATE ROOM

## 2020-03-30 PROCEDURE — 90833 PR PSYCHOTHERAPY W/PATIENT W/E&M, 30 MIN (ADD ON): ICD-10-PCS | Mod: S$PBB,,, | Performed by: PSYCHIATRY & NEUROLOGY

## 2020-03-30 PROCEDURE — 99233 SBSQ HOSP IP/OBS HIGH 50: CPT | Mod: S$PBB,,, | Performed by: PSYCHIATRY & NEUROLOGY

## 2020-03-30 RX ORDER — DIPHENHYDRAMINE HCL 50 MG
50 CAPSULE ORAL NIGHTLY
Status: DISCONTINUED | OUTPATIENT
Start: 2020-03-30 | End: 2020-04-04

## 2020-03-30 RX ORDER — TALC
6 POWDER (GRAM) TOPICAL NIGHTLY
Status: DISCONTINUED | OUTPATIENT
Start: 2020-03-30 | End: 2020-04-08 | Stop reason: HOSPADM

## 2020-03-30 RX ADMIN — Medication 1 TABLET: at 09:03

## 2020-03-30 RX ADMIN — DIPHENHYDRAMINE HYDROCHLORIDE 50 MG: 50 CAPSULE ORAL at 11:03

## 2020-03-30 RX ADMIN — METFORMIN HYDROCHLORIDE 1000 MG: 500 TABLET ORAL at 07:03

## 2020-03-30 RX ADMIN — METFORMIN HYDROCHLORIDE 1000 MG: 500 TABLET ORAL at 04:03

## 2020-03-30 RX ADMIN — Medication 6 MG: at 11:03

## 2020-03-30 RX ADMIN — THERA TABS 1 TABLET: TAB at 09:03

## 2020-03-30 RX ADMIN — SIMVASTATIN 20 MG: 10 TABLET, FILM COATED ORAL at 09:03

## 2020-03-30 RX ADMIN — INSULIN ASPART 2 UNITS: 100 INJECTION, SOLUTION INTRAVENOUS; SUBCUTANEOUS at 04:03

## 2020-03-30 RX ADMIN — SAXAGLIPTIN 5 MG: 2.5 TABLET, FILM COATED ORAL at 09:03

## 2020-03-30 RX ADMIN — CARBAMAZEPINE 300 MG: 100 TABLET, EXTENDED RELEASE ORAL at 09:03

## 2020-03-30 RX ADMIN — OLANZAPINE 25 MG: 5 TABLET, FILM COATED ORAL at 11:03

## 2020-03-30 RX ADMIN — ASPIRIN 81 MG: 81 TABLET, COATED ORAL at 09:03

## 2020-03-30 NOTE — PLAN OF CARE
Pt out on unit majority of shift.Pt with poor sleep last night.Appetite good and grooming poor.Participating in unit activities.Pt less elevated today and no acting out behavior.Medication compliant.

## 2020-03-30 NOTE — PROGRESS NOTES
"PSYCHIATRY DAILY INPATIENT PROGRESS NOTE  SUBSEQUENT HOSPITAL VISIT    ENCOUNTER DATE: 3/30/2020  [facility]    DATE OF ADMISSION: 3/24/2020 12:41 AM  LENGTH OF STAY: 6 days    HISTORY    CHIEF COMPLAINT   Chau Lovett is a 59 y.o. male, seen during daily contreras rounds on the inpatient unit.  Chau Lovett presents with the chief complaint of psychosis/hallucinations, "I have been falling."    HPI   (Elements: Location, Quality, Severity, Duration, Timing, Content, Modifying Factors, Associated Signs & Symptoms)    The patient was seen and examined. The chart was reviewed.     Reviewed notes by LPN, Speciality Services, and RN from the last 24 hours.    The patient's case was discussed with the treatment team and care providers today, including RNs. LMSW, and Speciality Services.    Staff reports no behavioral or management issues.     The patient has been compliant with treatment. The patient denied any side effects.    The patient continues to lack insight into his illness. He denies all psychiatric symptoms, despite ongoing symtpoms of psychosis/craig which remain fx/bx impairing (some improvement noted).     His thought process is concrete but less distractible, which still limits treatment/therapy efficacy a this time.  He is pleasant and having no bx issues at this time.     Denied Symptoms of Depression: no diminished mood or loss of interest/anhedonia; no irritability, diminished energy, change in sleep, change in appetite, diminished concentration or cognition or indecisiveness, PMA/R, excessive guilt or hopelessness or worthlessness, or suicidal ideations     Continued Changes in Sleep: +trouble with initiation/maintenance, no early morning awakening with inability to return to sleep; +diminshed need for sleep; pt slept 2.5 hours last night     Denied Suicidal/Homicidal ideations: no active/passive ideations, organized plans, or future intentions     Continued but less Symptoms " of psychosis: less hallucinations, no delusions, no disorganized thinking, no disorganized behavior or abnormal motor behavior, no negative symptoms      Continued but less Symptoms of craig or hypomania: no elevated, no expansive, or less irritable mood with less increased energy or activity; with no inflated self-esteem or grandiosity, +decreased need for sleep, no increased rate of speech, no FOI or racing thoughts, less distractibility, no increased goal directed activity or PMA, less risky/disinhibited behavior    Denied Symptoms of Anxiety: no excessive anxiety/worry/fear, no panic attacks; without agoraphobia    PSYCHOTHERAPY ADD-ON +87519   30 (16-37*) minutes    Time: 16 minutes  Participants: Met with patient    Therapeutic Intervention Type: behavior modifying psychotherapy, supportive psychotherapy  Why chosen therapy is appropriate versus another modality: relevant to diagnosis, patient responds to this modality, evidence based practice    Target symptoms: mood disorder, psychosis  Primary focus: psychosis, stressors, compliance  Psychotherapeutic techniques: supportive and psycho-dynamic techniques; psycho-education; motivational techniques for compliance; self-redirection; preventing recurrence    Outcome monitoring methods: self-report, observation    Patient's response to intervention:  The patient's response to intervention is accepting.    Progress toward goals:  The patient's progress toward goals is limited.      ROS  General ROS: negative  Ophthalmic ROS: negative  ENT ROS: negative  Allergy and Immunology ROS: negative  Hematological and Lymphatic ROS: negative  Endocrine ROS: negative  Respiratory ROS: no cough, shortness of breath, or wheezing  Cardiovascular ROS: no chest pain or dyspnea on exertion  Gastrointestinal ROS: no abdominal pain, change in bowel habits, or black or bloody stools  Genito-Urinary ROS: no dysuria, trouble voiding, or hematuria  Musculoskeletal ROS:  "negative  Neurological ROS: no TIA or stroke symptoms  Dermatological ROS: negative      PAST MEDICAL HISTORY   Past Medical History:   Diagnosis Date    Anxiety     Bipolar 1 disorder     Bradycardia     Depression     Diabetes mellitus     Hallucination     History of psychiatric hospitalization     [facility] 2/2020 and [facility]     of psychiatric care     Hypertension     Jacinta     Nuclear sclerosis of both eyes 10/22/2015    Psychiatric exam requested by authority     Psychiatric problem     Psychosis     Renal disorder     Schizoaffective disorder     Sleep difficulties     Therapy     Thyroid disease     possible           PSYCHOTROPIC MEDICATIONS   Scheduled Meds:   aspirin  81 mg Oral Daily    carBAMazepine  300 mg Oral BID    folic acid-vit B6-vit B12 2.5-25-2 mg  1 tablet Oral Daily    metFORMIN  1,000 mg Oral BID WM    multivitamin  1 tablet Oral Daily    OLANZapine  30 mg Oral QHS    SAXagliptin  5 mg Oral Daily    simvastatin  20 mg Oral QHS     Continuous Infusions:  PRN Meds:.acetaminophen, aluminum-magnesium hydroxide-simethicone, docusate sodium, glucagon (human recombinant), glucose, glucose, glucose, hydrOXYzine pamoate, insulin aspart U-100, loperamide, OLANZapine **AND** OLANZapine        EXAMINATION    VITALS   Vitals:    03/30/20 0725   BP: 121/70   Pulse: 104   Resp: 18   Temp: 99.3 °F (37.4 °C)     Body mass index is 28.56 kg/m².      CONSTITUTIONAL  General Appearance: , in hospital garb, overweight; NAD     MUSCULOSKELETAL  Muscle Strength and Tone:  normal  Abnormal Involuntary Movements:  None- no hand tremor today  Gait and Station:  normal; mildly ataxic     PSYCHIATRIC   Level of Consciousness: awake, alert  Orientation: p/p/t/s  Grooming:  adequate to circumstances  Psychomotor Behavior: no PMA/R  Speech: nl r/t/v/s  Language:  English fluent  Mood: "ok"  Affect: less decreased range, less irritable, more pleasant  Thought Process: mostly linear " and organized; racing thoughts at times; derailed at times  Associations:  intact; no CARLEY  Thought Content:  denied AVH/delusions; denied HI/SI  Memory:  intact to recent and remote events  Attention: mostly intact to conversation; lesst distractible   Fund of Knowledge:  age and education appropriate  Estimate if Intelligence:  average based on work/education history, vocabulary and mental status exam  Insight:  fair- partially seeks help, understands/accepts illness  Judgment:   good- no bx issues, compliant and cooperative        DIAGNOSTIC TESTING   Laboratory Results  Recent Results (from the past 24 hour(s))   POCT glucose    Collection Time: 03/29/20 11:33 AM   Result Value Ref Range    POCT Glucose 209 (H) 70 - 110 mg/dL   POCT glucose    Collection Time: 03/29/20  4:27 PM   Result Value Ref Range    POCT Glucose 222 (H) 70 - 110 mg/dL   POCT glucose    Collection Time: 03/29/20  7:59 PM   Result Value Ref Range    POCT Glucose 183 (H) 70 - 110 mg/dL   POCT glucose    Collection Time: 03/30/20  7:45 AM   Result Value Ref Range    POCT Glucose 173 (H) 70 - 110 mg/dL         ASSESSMENT      IMPRESSION   Bipolar I Disorder MRE manic, severe with psychotic features  Insomnia secondary to a mental illness     EPS     Psychosocial stressors     Vitamin D insufficiency  B12 deficiency      Acute renal insufficiency  Hyponatremia  Abnormal TSH  Mixed hyperlipidemia  Uncontrolled type 2 diabetes mellitus with hyperglycemia, without long-term current use of insulin  Essential hypertension     MEDICAL DECISION MAKING       PROBLEM LIST AND MANAGEMENT PLANS; PRESCRIPTION DRUG MANAGEMENT  Compliance: yes  Side Effects: no  Regimen Adjustments:      Bipolar I Disorder mre manic, severe with psychotic features: pt counseled  -Start re-trial of Tegretol CR at 100 mg po BID- increase to 200 mg po BID (last known stabilizing dosage)- increase to 300 mg po BID; check level in 3 days  -Start re-trial of Zyprexa 5 mg po q HS-  increase to 10 mg po q HS- increase to 15 mg po q HS- increase to 20 mg po q HS- increase to 25 mg po q HS- increase to/continue 30 mg po q HS- decrease back to 25 mg po q HS (no benefit derived form higher dosage)  -we discussed his medication options in depth; the above medications were the only ones he was willing to try at this time     Insomnia: pt counseled  -start melatonin 6 mg po q hs  -Start Benadryl at 50 mg pop q HS    EPS: pt counseled  -benadryl as above     Psychosocial stressors: Pt counseled  -SW consulted and assisting with resources     Vitamin D insufficiency: Pt counseled  -Started/continue Vitamin D3 50,000 units po q week x 8 weeks (1/8 given)     B12 deficiency:  Pt counseled  -Give B12 1000 mcg IM x 1 on 3/24/20  -Start/continue Folbic po q day     Acute renal insufficiency: Pt counseled  -was likely from poor PO intake   -improved/resolved     Hyponatremia: pt counseled  -rechecked- resolved     Abnormal TSH:pt counseled  -recheck and continue to monitor     Mixed hyperlipidemia: pt counseled  Resume/continue simvastatin 20 mg po q day     Uncontrolled type 2 diabetes mellitus with hyperglycemia, without long-term current use of insulin: pt counseled  -resumed/continue metformin 1000 mg po BID with meals and Saxagliptim 5 mg po q day     Essential hypertension: pt counseled  Hold lisinopril for now; monitor BP closely and resume if needed        DIAGNOSTIC TESTING  Labs reviewed with patient; follow up pending labs     Disposition:  -SW to assist with aftercare planning and collateral  -Once stable discharge home with outpatient follow up care and/or rehab  -Continue inpatient treatment under a PEC and/or CEC for grave disability as evident by fx/bx impairing mood and psychotic symptoms    NEED FOR CONTINUED HOSPITALIZATION  Psychiatric illness continues to pose a potential threat to life or bodily function, of self or others, thereby requiring the need for continued inpatient psychiatric  hospitalization: Yes    Protective inpatient pyschiatric hospitalization required while a safe disposition plan is enacted: Yes    Patient stabilized and ready for discharge from inpatient psychiatric unit: No      STAFF:   Bao Naranjo MD  Psychiatry

## 2020-03-30 NOTE — PLAN OF CARE
Pt has slept 2.5 hours.  Pt woke about 0100 and has been walking up and down the darby and in and out his assigned room ever since.  Pt was asked repeatedly to return to his room but did not.  Pt's response was either, 'I'm just going up the darby' or 'I'm going to my room', and then continues on his way.  Pt not irritable or angry, just matter of fact.  At one point, pt came to the nurses station window and stated that he was there for his meds.  The time was about 0300.  Pathways clear and bed is low.  Q 15 minute safety checks ongoing.  All precautions maintained.

## 2020-03-30 NOTE — PLAN OF CARE
"Out and about the unit this evening.  Spent the evening in the dayroom watching TV, coloring, and talking with peers.  Pt is cooperative, pleasant.  Somewhat elevated.  Interacting appropriately with peers and staff.  Good appetite.  Pt is quite comfortable here on [facility].  Pt stated, "I lke it here.  I want to stay here."  Forgetful at times.    "

## 2020-03-30 NOTE — PROGRESS NOTES
03/30/20 1040   [facility] Group Therapy   Group Name Leisure Education   Specific Interventions Coping Skills Training  (the whole benefits of leisure activities)   Participation Level Attentive;Sharing   Participation Quality Cooperative   Insight/Motivation Applies New Skills;Improved   Affect/Mood Display Appropriate   Cognition Alert   Psychomotor WNL   Patient read, wrote and shared he attends [employer] to socialize, watch football as a spectator, does volunteer work to help others, walk for exercise, and meditate to be alone. Patient complimented CTRS blouse and talked about the color blue being his favorite color(repeating himself), intense stare, easily redirected.Patient encouraged to participate in healthy leisure activities to promote a healthier mind and body..

## 2020-03-30 NOTE — PSYCH
Spoke with a representative from [facility] who related that the patient will has not been accepted in their program.

## 2020-03-30 NOTE — PLAN OF CARE
"  Problem: Adult Behavioral Health Plan of Care  Goal: Optimized Coping Skills in Response to Life Stressors  Intervention: Promote Effective Coping Strategies  Flowsheets (Taken 3/30/2020 1315)  Supportive Measures: active listening utilized; counseling provided; positive reinforcement provided; verbalization of feelings encouraged; problem solving facilitated; decision-making supported; self-care encouraged; goal setting facilitated; relaxation techniques promoted; self-reflection promoted; journaling promoted; self-responsibility promoted     Behavior: Patient attended psychotherapeutic group dressed in personal clothing, fair hygiene with congruent affect and "great" mood, attentive posture, and appropriate eye contact. Patient remained engaged and attentive.    Intervention:  will engage patients in a process group designed to stimulate self-reflection and insight. We will start with an ice breaker, in which patients will complete a crossword puzzle with communication words. We will discuss which word is most important to them right now and why. Then we will discuss the problem solving skills, used, translate that to real life situations, and break down the words in relation to effective communication. Patients will be given time to express thoughts, concerns and goals for treatment and discharge, as well as perceived barriers to progress.    Response: Patient remained attentive and engaged. He was focused on getting the words in the cross word puzzle and had a hard time concentrating during discussion. He seems to have poverty of thought, as he continues to talk in motivation quotes. He has minimal insight into his presentation or problematic behaviors. He requires a lot of reassurance and has been trying to give staff pictures or other activities that he completes.  told him to keep his crossword puzzle or throw it away, to model appropriate boundaries. He said "I don't throw " "anything away."     Plan:  will continue to encourage patient to explore and verbalize emotions, set goals to aid in preventing re-hospitalization, attend psychotherapeutic group, and follow up with aftercare appointments.  "

## 2020-03-31 LAB
POCT GLUCOSE: 138 MG/DL (ref 70–110)
POCT GLUCOSE: 167 MG/DL (ref 70–110)
POCT GLUCOSE: 170 MG/DL (ref 70–110)
POCT GLUCOSE: 196 MG/DL (ref 70–110)

## 2020-03-31 PROCEDURE — 90833 PR PSYCHOTHERAPY W/PATIENT W/E&M, 30 MIN (ADD ON): ICD-10-PCS | Mod: ,,, | Performed by: PSYCHIATRY & NEUROLOGY

## 2020-03-31 PROCEDURE — 90833 PSYTX W PT W E/M 30 MIN: CPT | Mod: ,,, | Performed by: PSYCHIATRY & NEUROLOGY

## 2020-03-31 PROCEDURE — 11400000 HC PSYCH PRIVATE ROOM

## 2020-03-31 PROCEDURE — 25000003 PHARM REV CODE 250: Performed by: PSYCHIATRY & NEUROLOGY

## 2020-03-31 PROCEDURE — 99233 SBSQ HOSP IP/OBS HIGH 50: CPT | Mod: ,,, | Performed by: PSYCHIATRY & NEUROLOGY

## 2020-03-31 PROCEDURE — 99233 PR SUBSEQUENT HOSPITAL CARE,LEVL III: ICD-10-PCS | Mod: ,,, | Performed by: PSYCHIATRY & NEUROLOGY

## 2020-03-31 RX ORDER — OLANZAPINE 10 MG/1
20 TABLET ORAL NIGHTLY
Status: DISCONTINUED | OUTPATIENT
Start: 2020-03-31 | End: 2020-04-08 | Stop reason: HOSPADM

## 2020-03-31 RX ADMIN — Medication 6 MG: at 09:03

## 2020-03-31 RX ADMIN — METFORMIN HYDROCHLORIDE 1000 MG: 500 TABLET ORAL at 04:03

## 2020-03-31 RX ADMIN — SAXAGLIPTIN 5 MG: 2.5 TABLET, FILM COATED ORAL at 08:03

## 2020-03-31 RX ADMIN — CARBAMAZEPINE 300 MG: 100 TABLET, EXTENDED RELEASE ORAL at 09:03

## 2020-03-31 RX ADMIN — METFORMIN HYDROCHLORIDE 1000 MG: 500 TABLET ORAL at 07:03

## 2020-03-31 RX ADMIN — DIPHENHYDRAMINE HYDROCHLORIDE 50 MG: 50 CAPSULE ORAL at 09:03

## 2020-03-31 RX ADMIN — SIMVASTATIN 20 MG: 10 TABLET, FILM COATED ORAL at 09:03

## 2020-03-31 RX ADMIN — Medication 1 TABLET: at 08:03

## 2020-03-31 RX ADMIN — OLANZAPINE 20 MG: 10 TABLET, FILM COATED ORAL at 11:03

## 2020-03-31 RX ADMIN — THERA TABS 1 TABLET: TAB at 08:03

## 2020-03-31 RX ADMIN — ASPIRIN 81 MG: 81 TABLET, COATED ORAL at 08:03

## 2020-03-31 RX ADMIN — CARBAMAZEPINE 300 MG: 100 TABLET, EXTENDED RELEASE ORAL at 08:03

## 2020-03-31 NOTE — PLAN OF CARE
Pt out on unit all shift.Pt slept better last night.Appetite good.Hygiene and grooming poor.Pt attended Tx Team and participated.Pt participating in groups.Pt less expansive and no acting out behavior.Medication compliant.

## 2020-03-31 NOTE — PROGRESS NOTES
"   03/31/20 1040   [facility] Group Therapy   Group Name Leisure Skills Training   Specific Interventions Cognitive Stimulation Training   Participation Level Active;Attentive   Participation Quality Cooperative;Social   Insight/Motivation Applies New Skills;Improved   Affect/Mood Display Appropriate   Cognition Alert   Psychomotor WNL   Patient reports "fine, relaxed" mood, enjoys the activity, works well with peers, attention seeking, easily redirected. Patient shared  watching TV and reading at home relaxes him.  "

## 2020-03-31 NOTE — PLAN OF CARE
Recommendation:   1. Continue Diabetic diet as ordered;  2. encourage pt to increase intake of meals    Goals: increase intake of meals to 75% by f/u  Nutrition Goal Status: progressing towards goal  Nutrition Discharge Planning: Diabetic/low sodium

## 2020-03-31 NOTE — PLAN OF CARE
Pt has slept 7 hours with one interruption and is sleeping at this time.  Pt up once to walk down the darby then returned to assigned bed and quickly resumed sleeping.  Much better than the night before.  NAD.  Resp even & unlabored.  Pathways clear and bed is low.  Q 15 minute safety checks ongoing.  All precautions maintained.

## 2020-03-31 NOTE — PLAN OF CARE
Problem: Adult Behavioral Health Plan of Care  Goal: Optimized Coping Skills in Response to Life Stressors  Outcome: Ongoing, Progressing      Behavior: Patient attended psychotherapeutic group dressed in  personal clothing, slightly disheveled with congruent but shallow affect and happy mood, attentive posture, and appropriate eye contact. Patient remained engaged and attentive.    Intervention:  will engage patients in a process group designed to stimulate self-reflection and insight. We will start with an ice breaker, in which patients will complete a crossword puzzle with stress relief words. We will discuss which word is most important to them right now and why. Then we will discuss the phases of grief. Patient's will be asked to apply this to their lives and describe something they are grieving or have grieved. Patients will be given time to express thoughts, concerns and goals for treatment and discharge, as well as perceived barriers to progress.    Response: Patient remained attentive and engaged in conversation but is repetitive and has minimal insight. He is unable to give examples of topics as it relates to his life.     Plan:  will continue to encourage patient to explore and verbalize emotions, set goals to aid in preventing re-hospitalization, attend psychotherapeutic group, and follow up with aftercare appointments.

## 2020-03-31 NOTE — PLAN OF CARE
Out and about in unit most of shift. Mood calm and cooperative. Compliant with medications and unit routine.. Interaction with peers. Safety measures maintained. Will continue to monitor.

## 2020-03-31 NOTE — PLAN OF CARE
"  Problem: Adult Behavioral Health Plan of Care  Goal: Rounds/Family Conference  Outcome: Ongoing, Progressing  Flowsheets (Taken 3/31/2020 5131)  Participants: psychiatrist; other (see comments); social work; patient; therapeutic recreation; nursing (social )  Summary: treatment team update     Chief Complaint:  Patient presented with elevated mood with psychotic features.     Current:  Patient presented to treatment team dressed in personal clothing, fair hygiene, elevated mood, concrete answers. He says that he is in the hospital due to "loss my balance and fell twice." Pt still has minimal insight into the precipitating event. He claims he wanted to come here. He was found in a ditch in front of the [employer]. His aunt called the police. He has not mentioned this since admit. He claims he is getting along well with his family members. Pt lives alone and he claims that Libia helps him with groceries and food, libia says that an aunt helps with that. Pt says that Libia and his arguments are usually due to "him telling me what to do." Pt claims that Libia took over POA when his mother  and a  was involved in the process. Pt says that he started [facility] when his cousin said he would pay bail to get out of skilled nursing. He was defensive about not going to [facility]. He says that he is willing to go to [facility] while waiting for [facility] to reopen. Pt does not have a smart phone or a computer. He says that his aunt Leti has a smart phone that he may be able to use if needed. Discharge planner will call home health to see their options. Pt has trouble maintaining focus. Discussed treatment plan and medications.     Plan:  Patient will be encouraged to engage in psychotherapeutic groups and recreational therapy. Patient's medication will be monitored and adjusted as needed. Patient will be encouraged to follow up with aftercare " appointments.

## 2020-03-31 NOTE — PROGRESS NOTES
"[facility]  Adult Nutrition  Progress Note    SUMMARY       Recommendations    Recommendation:   1. Continue Diabetic diet as ordered;  2. encourage pt to increase intake of meals    Goals: increase intake of meals to 75% by f/u  Nutrition Goal Status: progressing towards goal  Communication of RD Recs: (POC)    Reason for Assessment    Reason For Assessment: RD follow-up  Diagnosis: psychological disorder  Relevant Medical History: Bipolar 1 disorder, T2DM, psych, HTN, craig    General Information Comments: Meal intake fluctuates from 25-75% with 100% intake of snacks. Due to recent hospital wide restrictions to limit the transfer of (COVID-19), we are not performing any physical exams at this time. All S/S will be observational; NFPE to be performed at a future date.     Nutrition Discharge Planning: Diabetic/low sodium     Nutrition Risk Screen    Nutrition Risk Screen: no indicators present    Nutrition/Diet History    Spiritual, Cultural Beliefs, Anabaptist Practices, Values that Affect Care: no  Food Allergies: NKFA  Factors Affecting Nutritional Intake: None identified at this time    Anthropometrics    Temp: 99.5 °F (37.5 °C)  Height Method: Stated  Height: 5' 10" (177.8 cm)  Height (inches): 70 in  Weight Method: Standard Scale  Weight: 90.3 kg (199 lb 1.2 oz)  Weight (lb): 199.08 lb  Ideal Body Weight (IBW), Male: 166 lb  % Ideal Body Weight, Male (lb): 122.11 %  BMI (Calculated): 28.6  BMI Grade: 25 - 29.9 - overweight     Lab/Procedures/Meds    Pertinent Labs Reviewed: reviewed  Pertinent Labs Comments: glucose 167  Pertinent Medications Reviewed: reviewed  Pertinent Medications Comments: Folic acid, metformin, multivitamin, statin    Estimated/Assessed Needs    Weight Used For Calorie Calculations: 91.6 kg (202 lb)  Energy Calorie Requirements (kcal): 2258  Energy Need Method: [place]  Protein Requirements: 73-91 g(.8-1 g/kg)  Weight Used For Protein Calculations: 91.6 kg (202 " lb)     Estimated Fluid Requirement Method: RDA Method  RDA Method (mL): 2258  CHO Requirement: 50% of calories    Nutrition Prescription Ordered    Current Diet Order: Diabetic    Evaluation of Received Nutrient/Fluid Intake    Fluid Required: meeting needs  % Intake of Estimated Energy Needs: 50 - 75 %  % Meal Intake: 25-75%    Nutrition Risk    Level of Risk/Frequency of Follow-up: (1x/wk)     Assessment and Plan  Nutrition Problem:  Inadequate energy intake     Related to (etiology):   psychosis     Signs and Symptoms (as evidenced by):   Meal intake is 50%     Interventions:  General Healthful Diet     Nutrition Diagnosis Status:   Improving    Monitor and Evaluation    Food and Nutrient Intake: energy intake, food and beverage intake  Food and Nutrient Adminstration: diet order  Anthropometric Measurements: weight, weight change, body mass index  Biochemical Data, Medical Tests and Procedures: electrolyte and renal panel, glucose/endocrine profile  Nutrition-Focused Physical Findings: overall appearance     Nutrition Follow-Up    RD Follow-up?: Yes

## 2020-03-31 NOTE — PLAN OF CARE
Problem: Adult Behavioral Health Plan of Care  Goal: Develops/Participates in Therapeutic [place] to Support Successful Transition  Outcome: Ongoing, Progressing      reached out to Russell Amin at [phone] to inform of the patient's discharge for Thursday around noon.  informed him that he will be set up with [facility], while [facility] is closed. [facility] will contact him when they reopen. He will need a ride to the facility, which is at [address] [facility] in [place] [address] at [phone] for Tuesday, 4/14/2020 at 10 AM. He says that he will need transportation. His cell number is [phone]. He asked that the patient be referred back to [facility], who was his provider before he went to [facility].

## 2020-04-01 LAB
POCT GLUCOSE: 135 MG/DL (ref 70–110)
POCT GLUCOSE: 163 MG/DL (ref 70–110)
POCT GLUCOSE: 174 MG/DL (ref 70–110)
POCT GLUCOSE: 178 MG/DL (ref 70–110)

## 2020-04-01 PROCEDURE — 99233 SBSQ HOSP IP/OBS HIGH 50: CPT | Mod: ,,, | Performed by: PSYCHIATRY & NEUROLOGY

## 2020-04-01 PROCEDURE — 90833 PR PSYCHOTHERAPY W/PATIENT W/E&M, 30 MIN (ADD ON): ICD-10-PCS | Mod: ,,, | Performed by: PSYCHIATRY & NEUROLOGY

## 2020-04-01 PROCEDURE — 90833 PSYTX W PT W E/M 30 MIN: CPT | Mod: ,,, | Performed by: PSYCHIATRY & NEUROLOGY

## 2020-04-01 PROCEDURE — 11400000 HC PSYCH PRIVATE ROOM

## 2020-04-01 PROCEDURE — 25000003 PHARM REV CODE 250: Performed by: PSYCHIATRY & NEUROLOGY

## 2020-04-01 PROCEDURE — 99233 PR SUBSEQUENT HOSPITAL CARE,LEVL III: ICD-10-PCS | Mod: ,,, | Performed by: PSYCHIATRY & NEUROLOGY

## 2020-04-01 RX ADMIN — METFORMIN HYDROCHLORIDE 1000 MG: 500 TABLET ORAL at 08:04

## 2020-04-01 RX ADMIN — OLANZAPINE 20 MG: 10 TABLET, FILM COATED ORAL at 09:04

## 2020-04-01 RX ADMIN — Medication 6 MG: at 09:04

## 2020-04-01 RX ADMIN — METFORMIN HYDROCHLORIDE 1000 MG: 500 TABLET ORAL at 04:04

## 2020-04-01 RX ADMIN — THERA TABS 1 TABLET: TAB at 08:04

## 2020-04-01 RX ADMIN — Medication 1 TABLET: at 08:04

## 2020-04-01 RX ADMIN — DIPHENHYDRAMINE HYDROCHLORIDE 50 MG: 50 CAPSULE ORAL at 09:04

## 2020-04-01 RX ADMIN — CARBAMAZEPINE 300 MG: 100 TABLET, EXTENDED RELEASE ORAL at 08:04

## 2020-04-01 RX ADMIN — ASPIRIN 81 MG: 81 TABLET, COATED ORAL at 08:04

## 2020-04-01 RX ADMIN — SIMVASTATIN 20 MG: 10 TABLET, FILM COATED ORAL at 09:04

## 2020-04-01 RX ADMIN — CARBAMAZEPINE 300 MG: 100 TABLET, EXTENDED RELEASE ORAL at 09:04

## 2020-04-01 RX ADMIN — SAXAGLIPTIN 5 MG: 2.5 TABLET, FILM COATED ORAL at 08:04

## 2020-04-01 NOTE — PLAN OF CARE
Lying quiet in bed, eyes closed, respiration even and unlabored, appearing asleep.  Slept well most all shift except up to bathroom once at midnight and awake at 0430.  Was allowed to sit at end of darby A but requested to return to room shortly to get more sleep.  Did agree but did not follow through with this.  Remains at end of darby at present.   Safety and precautions maintained with rounds every 15 minutes, bed is fixed in a low position and pathways kept clear.  No fall occurred.

## 2020-04-01 NOTE — PLAN OF CARE
Problem: Adult Behavioral Health Plan of Care  Goal: Develops/Participates in Therapeutic [place] to Support Successful Transition  Outcome: Ongoing, Progressing      reached out to Russell Murraychica at [phone] to update him on the patient discharge information. After meeting with the treatment team, it was determined that the patient would benefit from a longer stay. Also the  informed him, per his request yesterday, that the discharge planner spoke to [facility], and they are no longer willing to accept him.     He verbalized understanding and was pleased with the determination. He says that he called [facility] in [place], who had someone from the [place] location call him. The woman who called is working from home as part of the [facility] outpatient program and her name is Lyssa (unsure of correct spelling) at [phone]. She informed Russell that they could are setting up their patient's with teletherapy and will pick the patient up from his home and bring him to their facility where they would see the nurse practitioner via their computer.  called and confirmed this. She says to send a discharge packet to [phone] to set up an appointment. This information will be passed to the treatment team.

## 2020-04-01 NOTE — PLAN OF CARE
Pt out on unit all shift.Pt reports fair sleep last night.Appetite good.Hygiene and grooming fair.Mood stable with no behavioral issues.No confusion noted.Steady on his feet.Medication compliant.

## 2020-04-01 NOTE — PROGRESS NOTES
"   04/01/20 1030   [facility] Group Therapy   Group Name Therapeutic Recreation   Specific Interventions Cognitive Stimulation Training   Participation Level Active;Appropriate;Attentive;Sharing   Participation Quality Cooperative;Social   Insight/Motivation Applies New Skills;Good   Affect/Mood Display Appropriate   Cognition Alert   Psychomotor WNL   Patient reports "I'm good" mood. Patient states his goal is to "focus on being honest about everything."  "

## 2020-04-01 NOTE — PROGRESS NOTES
"PSYCHIATRY DAILY INPATIENT PROGRESS NOTE  SUBSEQUENT HOSPITAL VISIT    ENCOUNTER DATE: 4/1/2020  SITE: [facility]    DATE OF ADMISSION: 3/24/2020 12:41 AM  LENGTH OF STAY: 8 days    HISTORY    CHIEF COMPLAINT   Chau Lovett is a 59 y.o. male, seen during daily contreras rounds on the inpatient unit.  Chau Lovett presents with the chief complaint of psychosis/hallucinations, "I have been falling."    HPI   (Elements: Location, Quality, Severity, Duration, Timing, Content, Modifying Factors, Associated Signs & Symptoms)    The patient was seen and examined. The chart was reviewed.     Reviewed notes by LPN, RD, LMSW, CTRS, and RN from the last 24 hours.    The patient's case was discussed with the treatment team and care providers today, including RNs, LMSW, CTRS, and [facility].    Staff reports no behavioral or management issues.     The patient has been compliant with treatment. The patient denied any side effects.    The patient continues to lack insight into his illness. He denies all psychiatric symptoms, despite ongoing symtpoms of psychosis/craig which remain fx/bx impairing (some improvement noted). He is unable to provide a narrative as to why he was hospitalized.     His thought process is concrete but less distractible, which still limits treatment/therapy efficacy a this time.  He is pleasant and having no bx issues at this time aside form having poor boundaries. He is compliant with treatment.     Collateral from his family reports that the patient has not been able to sustain more than 1-2 weeks out of the hospital; he is not stable enough to attend routine outpatient care (nearest available appointment is on 4/14/20). He requires an IOP to sustain stability.     Denied Symptoms of Depression: no diminished mood or loss of interest/anhedonia; no irritability, diminished energy, change in sleep, change in appetite, diminished concentration or cognition or indecisiveness, " PMA/R, excessive guilt or hopelessness or worthlessness, or suicidal ideations     Continued but less Changes in Sleep: +trouble with initiation/maintenance, no early morning awakening with inability to return to sleep; +diminshed need for sleep; pt slept 7.5 hours last night     Denied Suicidal/Homicidal ideations: no active/passive ideations, organized plans, or future intentions     Continued but lessening Symptoms of psychosis: less hallucinations, no delusions, no disorganized thinking, no disorganized behavior or abnormal motor behavior, no negative symptoms      Continued but lessening Symptoms of craig or hypomania: no elevated, no expansive, or less irritable mood with less increased energy or activity; with no inflated self-esteem or grandiosity, less decreased need for sleep, no increased rate of speech, no FOI or racing thoughts, less distractibility, no increased goal directed activity or PMA, less risky/disinhibited behavior    Denied Symptoms of Anxiety: no excessive anxiety/worry/fear, no panic attacks; without agoraphobia    PSYCHOTHERAPY ADD-ON +[id number]   30 (16-37*) minutes    Time: 16 minutes  Participants: Met with patient    Therapeutic Intervention Type: behavior modifying psychotherapy, supportive psychotherapy  Why chosen therapy is appropriate versus another modality: relevant to diagnosis, patient responds to this modality, evidence based practice    Target symptoms: mood disorder, psychosis  Primary focus: psychosis, stressors, compliance  Psychotherapeutic techniques: supportive and psycho-dynamic techniques; psycho-education; motivational techniques for compliance; self-redirection; preventing recurrence and identifying discharge needs; identifying resources    Outcome monitoring methods: self-report, observation    Patient's response to intervention:  The patient's response to intervention is accepting.    Progress toward goals:  The patient's progress toward goals is  limited.      ROS  General ROS: negative  Ophthalmic ROS: negative  ENT ROS: negative  Allergy and Immunology ROS: negative  Hematological and Lymphatic ROS: negative  Endocrine ROS: negative  Respiratory ROS: no cough, shortness of breath, or wheezing  Cardiovascular ROS: no chest pain or dyspnea on exertion  Gastrointestinal ROS: no abdominal pain, change in bowel habits, or black or bloody stools  Genito-Urinary ROS: no dysuria, trouble voiding, or hematuria  Musculoskeletal ROS: negative  Neurological ROS: no TIA or stroke symptoms  Dermatological ROS: negative      PAST MEDICAL HISTORY   Past Medical History:   Diagnosis Date    Anxiety     Bipolar 1 disorder     Bradycardia     Depression     Diabetes mellitus     Hallucination     History of psychiatric hospitalization     [place] 2/2020 and [facility]erg[facility] with [facility]    Hx of psychiatric care     Hypertension     Jacinta     Nuclear sclerosis of both eyes 10/22/2015    Psychiatric exam requested by authority     Psychiatric problem     Psychosis     Renal disorder     Schizoaffective disorder     Sleep difficulties     Therapy     Thyroid disease     possible           PSYCHOTROPIC MEDICATIONS   Scheduled Meds:   aspirin  81 mg Oral Daily    carBAMazepine  300 mg Oral BID    diphenhydrAMINE  50 mg Oral QHS    folic acid-vit B6-vit B12 2.5-25-2 mg  1 tablet Oral Daily    melatonin  6 mg Oral Nightly    metFORMIN  1,000 mg Oral BID WM    multivitamin  1 tablet Oral Daily    OLANZapine  20 mg Oral QHS    SAXagliptin  5 mg Oral Daily    simvastatin  20 mg Oral QHS     Continuous Infusions:  PRN Meds:.acetaminophen, aluminum-magnesium hydroxide-simethicone, docusate sodium, glucagon (human recombinant), glucose, glucose, glucose, hydrOXYzine pamoate, insulin aspart U-100, loperamide, OLANZapine **AND** OLANZapine        EXAMINATION    VITALS   Vitals:    04/01/20 0743   BP: 123/72   Pulse: 100   Resp: 18   Temp: 100.1 °F (37.8 °C)     Body mass  "index is 28.28 kg/m².      CONSTITUTIONAL  General Appearance: WM, in hospital garb, overweight; NAD     MUSCULOSKELETAL  Muscle Strength and Tone:  normal  Abnormal Involuntary Movements:  None- no hand tremor today  Gait and Station:  normal; mildly ataxic     PSYCHIATRIC   Level of Consciousness: awake, alert  Orientation: p/p/t/s  Grooming:  adequate to circumstances  Psychomotor Behavior: no PMA/R  Speech: nl r/t/v/s  Language:  English fluent  Mood: "alright"  Affect: less decreased range, less irritable, more pleasant/calmer  Thought Process: mostly linear and organized; racing thoughts at times; derailed at times  Associations:  intact; no CARLEY  Thought Content:  denied AVH/delusions; denied HI/SI  Memory:  intact to recent and remote events  Attention: mostly intact to conversation; lesst distractible   Fund of Knowledge:  age and education appropriate  Estimate if Intelligence:  average based on work/education history, vocabulary and mental status exam  Insight:  fair- partially seeks help, understands/accepts illness  Judgment:   good- no bx issues, compliant and cooperative        DIAGNOSTIC TESTING   Laboratory Results  Recent Results (from the past 24 hour(s))   POCT glucose    Collection Time: 03/31/20 11:52 AM   Result Value Ref Range    POCT Glucose 138 (H) 70 - 110 mg/dL   POCT glucose    Collection Time: 03/31/20  4:45 PM   Result Value Ref Range    POCT Glucose 170 (H) 70 - 110 mg/dL   POCT glucose    Collection Time: 03/31/20  9:29 PM   Result Value Ref Range    POCT Glucose 196 (H) 70 - 110 mg/dL   POCT glucose    Collection Time: 04/01/20  7:26 AM   Result Value Ref Range    POCT Glucose 174 (H) 70 - 110 mg/dL         ASSESSMENT      IMPRESSION   Bipolar I Disorder MRE manic, severe with psychotic features  Insomnia secondary to a mental illness     EPS     Psychosocial stressors     Vitamin D insufficiency  B12 deficiency      Acute renal insufficiency  Hyponatremia  Abnormal TSH  Mixed " hyperlipidemia  Uncontrolled type 2 diabetes mellitus with hyperglycemia, without long-term current use of insulin  Essential hypertension     MEDICAL DECISION MAKING       PROBLEM LIST AND MANAGEMENT PLANS; PRESCRIPTION DRUG MANAGEMENT  Compliance: yes  Side Effects: no  Regimen Adjustments:      Bipolar I Disorder mre manic, severe with psychotic features: pt counseled  -Start re-trial of Tegretol CR at 100 mg po BID- increase to 200 mg po BID (last known stabilizing dosage)- increase to 300 mg po BID; check level in 1 day  -Start re-trial of Zyprexa 5 mg po q HS- increase to 10 mg po q HS- increase to 15 mg po q HS- increase to 20 mg po q HS- increase to 25 mg po q HS- increase to/continue 30 mg po q HS- decrease back to 25 mg po q HS- decrease back to 20 mg po q HS (no benefit derived form higher dosage)  -we discussed his medication options in depth; the above medications were the only ones he was willing to try at this time     Insomnia: pt counseled  -start melatonin 6 mg po q hs  -Started/conitnue Benadryl at 50 mg pop q HS    EPS: pt counseled  -benadryl as above     Psychosocial stressors: Pt counseled  -SW consulted and assised with resources     Vitamin D insufficiency: Pt counseled  -Started/continue Vitamin D3 50,000 units po q week x 8 weeks (1/8 given)     B12 deficiency:  Pt counseled  -Give B12 1000 mcg IM x 1 on 3/24/20  -Start/continue Folbic po q day     Acute renal insufficiency: Pt counseled  -was likely from poor PO intake   -improved/resolved     Hyponatremia: pt counseled  -rechecked- resolved     Abnormal TSH:pt counseled  -recheck and continue to monitor     Mixed hyperlipidemia: pt counseled  Resume/continue simvastatin 20 mg po q day     Uncontrolled type 2 diabetes mellitus with hyperglycemia, without long-term current use of insulin: pt counseled  -resumed/continue metformin 1000 mg po BID with meals and Saxagliptim 5 mg po q day     Essential hypertension: pt counseled  Hold  lisinopril for now; monitor BP closely and resume if needed        DIAGNOSTIC TESTING  Labs reviewed with patient; follow up pending labs- check TSH, CBC, CMP, and Tegretol level on Thursday     Disposition:  -SW to assist with aftercare planning and collateral  -Once stable discharge home with outpatient follow up care and/or rehab  -Continue inpatient treatment under a PEC and/or CEC for grave disability as evident by fx/bx impairing mood and psychotic symptoms    NEED FOR CONTINUED HOSPITALIZATION  Psychiatric illness continues to pose a potential threat to life or bodily function, of self or others, thereby requiring the need for continued inpatient psychiatric hospitalization: Yes    Protective inpatient pyschiatric hospitalization required while a safe disposition plan is enacted: Yes    Patient stabilized and ready for discharge from inpatient psychiatric unit: No      STAFF:   Bao Naranjo MD  Psychiatry
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
## 2020-04-01 NOTE — PLAN OF CARE
Plan of care reviewed.  Denies intent to harm self or others at this time.  Accepts snacks and medications.  Gait steady, no falls.  Pleasantly interacting with staff and peers. Did note some confusion/disorientation at bedtime.  Pt was able to correct himself but still wants to sleep late in AM and have his breakfast saved.  This is quite different than his admit conversation about how he sleeps only 4 hours per night.  Promoted an individualized safety plan, reality-based interactions, effective coping strategies, and impulse control.  Will continue to monitor for safety.
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
## 2020-04-02 LAB
ALBUMIN SERPL BCP-MCNC: 3 G/DL (ref 3.5–5.2)
ALP SERPL-CCNC: 83 U/L (ref 55–135)
ALT SERPL W/O P-5'-P-CCNC: 26 U/L (ref 10–44)
ANION GAP SERPL CALC-SCNC: 10 MMOL/L (ref 8–16)
AST SERPL-CCNC: 24 U/L (ref 10–40)
BASOPHILS # BLD AUTO: 0.01 K/UL (ref 0–0.2)
BASOPHILS NFR BLD: 0.2 % (ref 0–1.9)
BILIRUB SERPL-MCNC: 0.2 MG/DL (ref 0.1–1)
BUN SERPL-MCNC: 13 MG/DL (ref 6–20)
CALCIUM SERPL-MCNC: 8.7 MG/DL (ref 8.7–10.5)
CARBAMAZEPINE SERPL-MCNC: 10.5 UG/ML (ref 4–12)
CHLORIDE SERPL-SCNC: 99 MMOL/L (ref 95–110)
CO2 SERPL-SCNC: 29 MMOL/L (ref 23–29)
CREAT SERPL-MCNC: 0.9 MG/DL (ref 0.5–1.4)
DIFFERENTIAL METHOD: ABNORMAL
EOSINOPHIL # BLD AUTO: 0 K/UL (ref 0–0.5)
EOSINOPHIL NFR BLD: 0.4 % (ref 0–8)
ERYTHROCYTE [DISTWIDTH] IN BLOOD BY AUTOMATED COUNT: 13.2 % (ref 11.5–14.5)
EST. GFR  (AFRICAN AMERICAN): >60 ML/MIN/1.73 M^2
EST. GFR  (NON AFRICAN AMERICAN): >60 ML/MIN/1.73 M^2
GLUCOSE SERPL-MCNC: 141 MG/DL (ref 70–110)
HCT VFR BLD AUTO: 33.6 % (ref 40–54)
HGB BLD-MCNC: 11.2 G/DL (ref 14–18)
IMM GRANULOCYTES # BLD AUTO: 0.02 K/UL (ref 0–0.04)
IMM GRANULOCYTES NFR BLD AUTO: 0.4 % (ref 0–0.5)
LYMPHOCYTES # BLD AUTO: 0.6 K/UL (ref 1–4.8)
LYMPHOCYTES NFR BLD: 14.1 % (ref 18–48)
MCH RBC QN AUTO: 30.4 PG (ref 27–31)
MCHC RBC AUTO-ENTMCNC: 33.3 G/DL (ref 32–36)
MCV RBC AUTO: 91 FL (ref 82–98)
MONOCYTES # BLD AUTO: 0.6 K/UL (ref 0.3–1)
MONOCYTES NFR BLD: 12.8 % (ref 4–15)
NEUTROPHILS # BLD AUTO: 3.3 K/UL (ref 1.8–7.7)
NEUTROPHILS NFR BLD: 72.1 % (ref 38–73)
NRBC BLD-RTO: 0 /100 WBC
PLATELET # BLD AUTO: 271 K/UL (ref 150–350)
PMV BLD AUTO: 9.2 FL (ref 9.2–12.9)
POCT GLUCOSE: 135 MG/DL (ref 70–110)
POCT GLUCOSE: 143 MG/DL (ref 70–110)
POCT GLUCOSE: 207 MG/DL (ref 70–110)
POTASSIUM SERPL-SCNC: 4.5 MMOL/L (ref 3.5–5.1)
PROT SERPL-MCNC: 7 G/DL (ref 6–8.4)
RBC # BLD AUTO: 3.69 M/UL (ref 4.6–6.2)
SODIUM SERPL-SCNC: 138 MMOL/L (ref 136–145)
TSH SERPL DL<=0.005 MIU/L-ACNC: 1.73 UIU/ML (ref 0.4–4)
WBC # BLD AUTO: 4.54 K/UL (ref 3.9–12.7)

## 2020-04-02 PROCEDURE — 84443 ASSAY THYROID STIM HORMONE: CPT

## 2020-04-02 PROCEDURE — 90833 PR PSYCHOTHERAPY W/PATIENT W/E&M, 30 MIN (ADD ON): ICD-10-PCS | Mod: ,,, | Performed by: PSYCHIATRY & NEUROLOGY

## 2020-04-02 PROCEDURE — 80053 COMPREHEN METABOLIC PANEL: CPT

## 2020-04-02 PROCEDURE — 25000003 PHARM REV CODE 250: Performed by: PSYCHIATRY & NEUROLOGY

## 2020-04-02 PROCEDURE — 11400000 HC PSYCH PRIVATE ROOM

## 2020-04-02 PROCEDURE — 99233 SBSQ HOSP IP/OBS HIGH 50: CPT | Mod: ,,, | Performed by: PSYCHIATRY & NEUROLOGY

## 2020-04-02 PROCEDURE — 90833 PSYTX W PT W E/M 30 MIN: CPT | Mod: ,,, | Performed by: PSYCHIATRY & NEUROLOGY

## 2020-04-02 PROCEDURE — 99233 PR SUBSEQUENT HOSPITAL CARE,LEVL III: ICD-10-PCS | Mod: ,,, | Performed by: PSYCHIATRY & NEUROLOGY

## 2020-04-02 PROCEDURE — 80156 ASSAY CARBAMAZEPINE TOTAL: CPT

## 2020-04-02 PROCEDURE — 36415 COLL VENOUS BLD VENIPUNCTURE: CPT

## 2020-04-02 PROCEDURE — 85025 COMPLETE CBC W/AUTO DIFF WBC: CPT

## 2020-04-02 RX ADMIN — Medication 1 TABLET: at 08:04

## 2020-04-02 RX ADMIN — CARBAMAZEPINE 300 MG: 100 TABLET, EXTENDED RELEASE ORAL at 08:04

## 2020-04-02 RX ADMIN — SIMVASTATIN 20 MG: 10 TABLET, FILM COATED ORAL at 08:04

## 2020-04-02 RX ADMIN — OLANZAPINE 20 MG: 10 TABLET, FILM COATED ORAL at 08:04

## 2020-04-02 RX ADMIN — ASPIRIN 81 MG: 81 TABLET, COATED ORAL at 08:04

## 2020-04-02 RX ADMIN — Medication 6 MG: at 08:04

## 2020-04-02 RX ADMIN — THERA TABS 1 TABLET: TAB at 08:04

## 2020-04-02 RX ADMIN — METFORMIN HYDROCHLORIDE 1000 MG: 500 TABLET ORAL at 08:04

## 2020-04-02 RX ADMIN — SAXAGLIPTIN 5 MG: 2.5 TABLET, FILM COATED ORAL at 08:04

## 2020-04-02 RX ADMIN — DIPHENHYDRAMINE HYDROCHLORIDE 50 MG: 50 CAPSULE ORAL at 08:04

## 2020-04-02 RX ADMIN — METFORMIN HYDROCHLORIDE 1000 MG: 500 TABLET ORAL at 04:04

## 2020-04-02 NOTE — PROGRESS NOTES
"PSYCHIATRY DAILY INPATIENT PROGRESS NOTE  SUBSEQUENT HOSPITAL VISIT    ENCOUNTER DATE: 4/2/2020  SITE: [facility]    DATE OF ADMISSION: 3/24/2020 12:41 AM  LENGTH OF STAY: 9 days    HISTORY    CHIEF COMPLAINT   Chau Lovett is a 59 y.o. male, seen during daily contreras rounds on the inpatient unit.  Chau Lovett presents with the chief complaint of psychosis/hallucinations, "I have been falling."    HPI   (Elements: Location, Quality, Severity, Duration, Timing, Content, Modifying Factors, Associated Signs & Symptoms)    The patient was seen and examined. The chart was reviewed.     Reviewed notes by LPN, RD, LMSW, CTRS, and RN from the last 24 hours.    The patient's case was discussed with the treatment team and care providers today, including RNs, LMSW, CTRS, and [facility].    Staff reports no behavioral or management issues.     The patient has been compliant with treatment. The patient denied any side effects.    The patient continues to lack insight into his illness. He denies all psychiatric symptoms, despite ongoing symtpoms of psychosis/craig which remain fx/bx impairing (some improvement noted). He is unable to provide a narrative as to why he was hospitalized.     His thought process is concrete but less distractible, which still limits treatment/therapy efficacy a this time.  He is pleasant and having no bx issues at this time aside form having poor boundaries. He is compliant with treatment.     Collateral from his family reports that the patient has not been able to sustain more than 1-2 weeks out of the hospital; he is not stable enough to attend routine outpatient care (nearest available appointment is on 4/14/20). He requires an IOP to sustain stability.     Denied Symptoms of Depression: no diminished mood or loss of interest/anhedonia; no irritability, diminished energy, change in sleep, change in appetite, diminished concentration or cognition or indecisiveness, " PMA/R, excessive guilt or hopelessness or worthlessness, or suicidal ideations     Continued but less Changes in Sleep: +trouble with initiation/maintenance, no early morning awakening with inability to return to sleep; +diminshed need for sleep; pt slept 6hours last night     Denied Suicidal/Homicidal ideations: no active/passive ideations, organized plans, or future intentions     Continued but lessening Symptoms of psychosis: less hallucinations, no delusions, no disorganized thinking, no disorganized behavior or abnormal motor behavior, no negative symptoms      Continued but lessening Symptoms of craig or hypomania: no elevated, no expansive, or less irritable mood with less increased energy or activity; with no inflated self-esteem or grandiosity, less decreased need for sleep, no increased rate of speech, no FOI or racing thoughts, less distractibility, no increased goal directed activity or PMA, less risky/disinhibited behavior    Denied Symptoms of Anxiety: no excessive anxiety/worry/fear, no panic attacks; without agoraphobia    PSYCHOTHERAPY ADD-ON +[id number]   30 (16-37*) minutes    Time: 16 minutes  Participants: Met with patient    Therapeutic Intervention Type: behavior modifying psychotherapy, supportive psychotherapy  Why chosen therapy is appropriate versus another modality: relevant to diagnosis, patient responds to this modality, evidence based practice    Target symptoms: mood disorder, psychosis  Primary focus: psychosis, stressors, compliance  Psychotherapeutic techniques: supportive and psycho-dynamic techniques; psycho-education; motivational techniques for compliance; identifying stressors  Outcome monitoring methods: self-report, observation    Patient's response to intervention:  The patient's response to intervention is accepting.    Progress toward goals:  The patient's progress toward goals is limited.      ROS  General ROS: negative  Ophthalmic ROS: negative  ENT ROS: negative  Allergy  and Immunology ROS: negative  Hematological and Lymphatic ROS: negative  Endocrine ROS: negative  Respiratory ROS: no cough, shortness of breath, or wheezing  Cardiovascular ROS: no chest pain or dyspnea on exertion  Gastrointestinal ROS: no abdominal pain, change in bowel habits, or black or bloody stools  Genito-Urinary ROS: no dysuria, trouble voiding, or hematuria  Musculoskeletal ROS: negative  Neurological ROS: no TIA or stroke symptoms  Dermatological ROS: negative      PAST MEDICAL HISTORY   Past Medical History:   Diagnosis Date    Anxiety     Bipolar 1 disorder     Bradycardia     Depression     Diabetes mellitus     Hallucination     History of psychiatric hospitalization     [facility] 2/2020 and [facility] 3/[facility]    Hx of psychiatric care     Hypertension     Jacinta     Nuclear sclerosis of both eyes 10/22/2015    Psychiatric exam requested by authority     Psychiatric problem     Psychosis     Renal disorder     Schizoaffective disorder     Sleep difficulties     Therapy     Thyroid disease     possible           PSYCHOTROPIC MEDICATIONS   Scheduled Meds:   aspirin  81 mg Oral Daily    carBAMazepine  300 mg Oral BID    diphenhydrAMINE  50 mg Oral QHS    folic acid-vit B6-vit B12 2.5-25-2 mg  1 tablet Oral Daily    melatonin  6 mg Oral Nightly    metFORMIN  1,000 mg Oral BID WM    multivitamin  1 tablet Oral Daily    OLANZapine  20 mg Oral QHS    SAXagliptin  5 mg Oral Daily    simvastatin  20 mg Oral QHS     Continuous Infusions:  PRN Meds:.acetaminophen, aluminum-magnesium hydroxide-simethicone, docusate sodium, glucagon (human recombinant), glucose, glucose, glucose, hydrOXYzine pamoate, insulin aspart U-100, loperamide, OLANZapine **AND** OLANZapine        EXAMINATION    VITALS   Vitals:    04/02/20 0804   BP: 119/62   Pulse: 107   Resp: 20   Temp: 96.9 °F (36.1 °C)     Body mass index is 28.28 kg/m².      CONSTITUTIONAL  General Appearance: , in hospital gar,  "overweight; NAD     MUSCULOSKELETAL  Muscle Strength and Tone:  normal  Abnormal Involuntary Movements:  None- no hand tremor today  Gait and Station:  normal; mildly ataxic     PSYCHIATRIC   Level of Consciousness: awake, alert  Orientation: p/p/t/s  Grooming:  adequate to circumstances  Psychomotor Behavior: no PMA/R  Speech: nl r/t/v/s  Language:  English fluent  Mood: "great.. blessed"  Affect: improving range, less irritable, more pleasant/calmer  Thought Process: mostly linear and organized; racing thoughts at times; derailed at times  Associations:  intact; no CARLEY  Thought Content:  denied AVH/delusions; denied HI/SI  Memory:  intact to recent and remote events; 3/3 immediate; 2/3 at 3 minutes  Attention: mostly intact to conversation; less distractible   Fund of Knowledge: about age and education appropriate; 2/4 recent presidents; aware of current events  Estimate if Intelligence:  average based on work/education history, vocabulary and mental status exam  Insight:  fair- partially seeks help, understands/accepts illness  Judgment:   good- no bx issues, compliant and cooperative        DIAGNOSTIC TESTING   Laboratory Results  Recent Results (from the past 24 hour(s))   POCT glucose    Collection Time: 04/01/20 11:52 AM   Result Value Ref Range    POCT Glucose 163 (H) 70 - 110 mg/dL   POCT glucose    Collection Time: 04/01/20  4:47 PM   Result Value Ref Range    POCT Glucose 135 (H) 70 - 110 mg/dL   POCT glucose    Collection Time: 04/01/20  9:48 PM   Result Value Ref Range    POCT Glucose 178 (H) 70 - 110 mg/dL   CBC auto differential    Collection Time: 04/02/20  7:11 AM   Result Value Ref Range    WBC 4.54 3.90 - 12.70 K/uL    RBC 3.69 (L) 4.60 - 6.20 M/uL    Hemoglobin 11.2 (L) 14.0 - 18.0 g/dL    Hematocrit 33.6 (L) 40.0 - 54.0 %    Mean Corpuscular Volume 91 82 - 98 fL    Mean Corpuscular Hemoglobin 30.4 27.0 - 31.0 pg    Mean Corpuscular Hemoglobin Conc 33.3 32.0 - 36.0 g/dL    RDW 13.2 11.5 - 14.5 % "    Platelets 271 150 - 350 K/uL    MPV 9.2 9.2 - 12.9 fL    Immature Granulocytes 0.4 0.0 - 0.5 %    Gran # (ANC) 3.3 1.8 - 7.7 K/uL    Immature Grans (Abs) 0.02 0.00 - 0.04 K/uL    Lymph # 0.6 (L) 1.0 - 4.8 K/uL    Mono # 0.6 0.3 - 1.0 K/uL    Eos # 0.0 0.0 - 0.5 K/uL    Baso # 0.01 0.00 - 0.20 K/uL    nRBC 0 0 /100 WBC    Gran% 72.1 38.0 - 73.0 %    Lymph% 14.1 (L) 18.0 - 48.0 %    Mono% 12.8 4.0 - 15.0 %    Eosinophil% 0.4 0.0 - 8.0 %    Basophil% 0.2 0.0 - 1.9 %    Differential Method Automated    Comprehensive metabolic panel    Collection Time: 04/02/20  7:11 AM   Result Value Ref Range    Sodium 138 136 - 145 mmol/L    Potassium 4.5 3.5 - 5.1 mmol/L    Chloride 99 95 - 110 mmol/L    CO2 29 23 - 29 mmol/L    Glucose 141 (H) 70 - 110 mg/dL    BUN, Bld 13 6 - 20 mg/dL    Creatinine 0.9 0.5 - 1.4 mg/dL    Calcium 8.7 8.7 - 10.5 mg/dL    Total Protein 7.0 6.0 - 8.4 g/dL    Albumin 3.0 (L) 3.5 - 5.2 g/dL    Total Bilirubin 0.2 0.1 - 1.0 mg/dL    Alkaline Phosphatase 83 55 - 135 U/L    AST 24 10 - 40 U/L    ALT 26 10 - 44 U/L    Anion Gap 10 8 - 16 mmol/L    eGFR if African American >60 >60 mL/min/1.73 m^2    eGFR if non African American >60 >60 mL/min/1.73 m^2   TSH    Collection Time: 04/02/20  7:11 AM   Result Value Ref Range    TSH 1.728 0.400 - 4.000 uIU/mL   POCT glucose    Collection Time: 04/02/20  8:36 AM   Result Value Ref Range    POCT Glucose 207 (H) 70 - 110 mg/dL         ASSESSMENT      IMPRESSION   Bipolar I Disorder MRE manic, severe with psychotic features  Insomnia secondary to a mental illness     EPS     Psychosocial stressors     Vitamin D insufficiency  B12 deficiency      Acute renal insufficiency  Hyponatremia  Abnormal TSH  Mixed hyperlipidemia  Uncontrolled type 2 diabetes mellitus with hyperglycemia, without long-term current use of insulin  Essential hypertension     MEDICAL DECISION MAKING       PROBLEM LIST AND MANAGEMENT PLANS; PRESCRIPTION DRUG MANAGEMENT  Compliance: yes  Side  Effects: no  Regimen Adjustments:      Bipolar I Disorder mre manic, severe with psychotic features: pt counseled  -Start re-trial of Tegretol CR at 100 mg po BID- increase to 200 mg po BID (last known stabilizing dosage)- increase to 300 mg po BID; check level in 1 day  -Start re-trial of Zyprexa 5 mg po q HS- increase to 10 mg po q HS- increase to 15 mg po q HS- increase to 20 mg po q HS- increase to 25 mg po q HS- increase to/continue 30 mg po q HS- decrease back to 25 mg po q HS- decrease back to 20 mg po q HS (no benefit derived form higher dosage)  -we discussed his medication options in depth; the above medications were the only ones he was willing to try at this time     Insomnia: pt counseled  -start melatonin 6 mg po q hs  -Started/conitnue Benadryl at 50 mg pop q HS    EPS: pt counseled  -benadryl as above     Psychosocial stressors: Pt counseled  -SW consulted and assised with resources     Vitamin D insufficiency: Pt counseled  -Started/continue Vitamin D3 50,000 units po q week x 8 weeks (1/8 given)     B12 deficiency:  Pt counseled  -Give B12 1000 mcg IM x 1 on 3/24/20  -Start/continue Folbic po q day     Acute renal insufficiency: Pt counseled  -was likely from poor PO intake   -improved/resolved     Hyponatremia: pt counseled  -rechecked- resolved     Abnormal TSH:pt counseled  -recheck and continue to monitor     Mixed hyperlipidemia: pt counseled  Resume/continue simvastatin 20 mg po q day     Uncontrolled type 2 diabetes mellitus with hyperglycemia, without long-term current use of insulin: pt counseled  -resumed/continue metformin 1000 mg po BID with meals and Saxagliptim 5 mg po q day     Essential hypertension: pt counseled  Hold lisinopril for now; monitor BP closely and resume if needed        DIAGNOSTIC TESTING  Labs reviewed with patient; follow up pending labs- check TSH, CBC, CMP, and Tegretol level on Thursday     Disposition:  -SW to assist with aftercare planning and collateral  -Once  stable discharge home with outpatient follow up care and/or rehab  -Continue inpatient treatment under a PEC and/or C[facility] for grave disability as evident by fx/bx impairing mood and psychotic symptoms    NEED FOR CONTINUED HOSPITALIZATION  Psychiatric illness continues to pose a potential threat to life or bodily function, of self or others, thereby requiring the need for continued inpatient psychiatric hospitalization: Yes    Protective inpatient pyschiatric hospitalization required while a safe disposition plan is enacted: Yes    Patient stabilized and ready for discharge from inpatient psychiatric unit: No      STAFF:   Bao Naranjo MD  Psychiatry

## 2020-04-02 NOTE — PROGRESS NOTES
"   04/02/20 1030   [facility] Group Therapy   Group Name Therapeutic Recreation   Specific Interventions Relaxation Training  (soothing music and jigsaw puzzles)   Participation Level Active;Appropriate;Attentive   Participation Quality Cooperative;Social   Insight/Motivation Good   Affect/Mood Display Appropriate   Cognition Alert   Psychomotor WNL   Patient states "this makes me concentrate, relax, I love doing puzzles." Patient became tearful when hearing the song "Can't Fight This Feeling". Patient states "this is my favorite song, makes me think about life, love, happiness, loneliness." Patient singing "I can't fight the feeling anymore I forgot where I started."  "

## 2020-04-02 NOTE — PLAN OF CARE
Plan of care reviewed.  Denies intent to harm self or others at this time.  Accepts snacks and medications.  Gait steady, no falls.  Reiterated about safety instruction to prevent falls and pt nonchalantly agreed to these fall prevention instructions. Pt has been more blunted but more agreeable.  Interacting some with staff and peers. Promoted an individualized safety plan, reality-based interactions, effective coping strategies, and impulse control.  Will continue to monitor for safety.

## 2020-04-02 NOTE — PLAN OF CARE
Pt is interactive on unit.  Compliant with treatment.  Denies any S/I or H/I at this time.  Encouraged pt to continue following treatment plan and to report any issues to staff.  Pt verbalized understanding.  No acute distress apparent at this time, will continue to monitor.

## 2020-04-02 NOTE — PLAN OF CARE
Problem: Adult Behavioral Health Plan of Care  Goal: Optimized Coping Skills in Response to Life Stressors  Intervention: Promote Effective Coping Strategies  Flowsheets (Taken 4/2/2020 4569)  Supportive Measures: active listening utilized; counseling provided; positive reinforcement provided; verbalization of feelings encouraged; problem solving facilitated; decision-making supported; relaxation techniques promoted; goal setting facilitated; self-care encouraged; self-reflection promoted; self-responsibility promoted; journaling promoted       Behavior: Patient attended psychotherapeutic group dressed in personal clothing, appropriate grooming with congruent affect and childlike, cooperative, good mood, attentive posture with arms crossed, and appropriate eye contact. Patient remained engaged and attentive.    Intervention:  will engage patients in a process group focused on self-forgiveness. A discussion will be based on forgiveness, what it is, what it entails, and ways to practice it. Patients will be given time to express thoughts, concerns and goals for treatment and discharge, as well as perceived barriers to progress.    Response: Patient remained attentive and engaged in conversation but focused tennis. He reports grandiose delusions of being on the [employer] list with shavon youngblood and being taken off so that someone else could be put there. Though the patient was having trouble applying forgiveness to his life, we discussed insight into the aspect of forgiveness that is for ourselves and the control that resentment has on us.     Plan:  will continue to encourage patient to explore and verbalize emotions, set goals to aid in preventing re-hospitalization, attend psychotherapeutic group, and follow up with aftercare appointments.

## 2020-04-03 LAB
POCT GLUCOSE: 153 MG/DL (ref 70–110)
POCT GLUCOSE: 164 MG/DL (ref 70–110)
POCT GLUCOSE: 188 MG/DL (ref 70–110)

## 2020-04-03 PROCEDURE — 25000003 PHARM REV CODE 250: Performed by: PSYCHIATRY & NEUROLOGY

## 2020-04-03 PROCEDURE — 11400000 HC PSYCH PRIVATE ROOM

## 2020-04-03 PROCEDURE — 90833 PSYTX W PT W E/M 30 MIN: CPT | Mod: ,,, | Performed by: PSYCHIATRY & NEUROLOGY

## 2020-04-03 PROCEDURE — 90833 PR PSYCHOTHERAPY W/PATIENT W/E&M, 30 MIN (ADD ON): ICD-10-PCS | Mod: ,,, | Performed by: PSYCHIATRY & NEUROLOGY

## 2020-04-03 PROCEDURE — 99233 SBSQ HOSP IP/OBS HIGH 50: CPT | Mod: ,,, | Performed by: PSYCHIATRY & NEUROLOGY

## 2020-04-03 PROCEDURE — 99233 PR SUBSEQUENT HOSPITAL CARE,LEVL III: ICD-10-PCS | Mod: ,,, | Performed by: PSYCHIATRY & NEUROLOGY

## 2020-04-03 RX ORDER — CARBAMAZEPINE 400 MG/1
400 TABLET, EXTENDED RELEASE ORAL 2 TIMES DAILY
Status: DISCONTINUED | OUTPATIENT
Start: 2020-04-03 | End: 2020-04-08 | Stop reason: HOSPADM

## 2020-04-03 RX ADMIN — METFORMIN HYDROCHLORIDE 1000 MG: 500 TABLET ORAL at 09:04

## 2020-04-03 RX ADMIN — METFORMIN HYDROCHLORIDE 1000 MG: 500 TABLET ORAL at 04:04

## 2020-04-03 RX ADMIN — THERA TABS 1 TABLET: TAB at 09:04

## 2020-04-03 RX ADMIN — SAXAGLIPTIN 5 MG: 2.5 TABLET, FILM COATED ORAL at 09:04

## 2020-04-03 RX ADMIN — CARBAMAZEPINE 400 MG: 400 TABLET, EXTENDED RELEASE ORAL at 08:04

## 2020-04-03 RX ADMIN — Medication 6 MG: at 08:04

## 2020-04-03 RX ADMIN — OLANZAPINE 20 MG: 10 TABLET, FILM COATED ORAL at 08:04

## 2020-04-03 RX ADMIN — DIPHENHYDRAMINE HYDROCHLORIDE 50 MG: 50 CAPSULE ORAL at 08:04

## 2020-04-03 RX ADMIN — CARBAMAZEPINE 300 MG: 100 TABLET, EXTENDED RELEASE ORAL at 09:04

## 2020-04-03 RX ADMIN — Medication 1 TABLET: at 09:04

## 2020-04-03 RX ADMIN — SIMVASTATIN 20 MG: 10 TABLET, FILM COATED ORAL at 08:04

## 2020-04-03 RX ADMIN — ASPIRIN 81 MG: 81 TABLET, COATED ORAL at 09:04

## 2020-04-03 NOTE — PLAN OF CARE
Pt is calm and cooperative.  Denies any S/I or H/I at this time.  Thought process seems to be improving.  Blood sugar checks WDL.  No complaints voiced.  Attending groups and participating in treatment.  No acute distress apparent at this time, will continue to monitor.

## 2020-04-03 NOTE — PLAN OF CARE
"  Problem: Adult Behavioral Health Plan of Care  Goal: Optimized Coping Skills in Response to Life Stressors  Intervention: Promote Effective Coping Strategies  Flowsheets (Taken 4/3/2020 0229)  Supportive Measures: active listening utilized; counseling provided; positive reinforcement provided; verbalization of feelings encouraged; problem solving facilitated; decision-making supported; relaxation techniques promoted; self-care encouraged; goal setting facilitated; self-reflection promoted; self-responsibility promoted; journaling promoted     Behavior: Patient attended psychotherapeutic group dressed in personal clothing, fair hygiene with congruent affect and superficial- evidenced by quickly changing mood strongly based on reaction of others and likely influenced by intellectual delay- mood, attentive posture, and hypervigilent eye contact. Patient remained engaged and attentive.    Intervention:  will engage patients in a process group focused on anger. This will allow patient's the time to gain perspective into the emotions they feel, their reactions to the emotions, and their control. Patients will be given time to express thoughts, concerns and goals for treatment and discharge, as well as perceived barriers to progress.    Response: Patient remained attentive and engaged in conversation providing vague motivation quote responses to most prompts. He is exhibiting behaviors indicating some innappropriate boundaries saying that the new patient is the daughter he never had. He was told by staff to stay at least 6 feet away and will be monitored.  modeled appropriate behavior by not allowing him to dwell on her being a "sweet heart."  redirected and reframed, "yes she is sweet. Our topic is anger." During the anger discussion, he recognizes that he struggles with walking away before he acts in anger.    Plan:  will continue to encourage patient to explore and " verbalize emotions, set goals to aid in preventing re-hospitalization, attend psychotherapeutic group, and follow up with aftercare appointments.

## 2020-04-03 NOTE — PROGRESS NOTES
"PSYCHIATRY DAILY INPATIENT PROGRESS NOTE  SUBSEQUENT HOSPITAL VISIT    ENCOUNTER DATE: 4/3/2020  [facility]    DATE OF ADMISSION: 3/24/2020 12:41 AM  LENGTH OF STAY: 10 days    HISTORY    CHIEF COMPLAINT   Chau Lovett is a 59 y.o. male, seen during daily contreras rounds on the inpatient unit.  Chau Lovett presents with the chief complaint of psychosis/hallucinations, "I have been falling."    HPI   (Elements: Location, Quality, Severity, Duration, Timing, Content, Modifying Factors, Associated Signs & Symptoms)    The patient was seen and examined. The chart was reviewed.     Reviewed notes by LMSW, CTRS, and RN from the last 24 hours.    The patient's case was discussed with the treatment team and care providers today, including RNs, LMSW, CTRS, and [facility].    Staff reports no behavioral or management issues. He had a "disagreement" with staff but "I got over it."    The patient has been compliant with treatment. The patient denied any side effects.    The patient continues to lack insight into his illness. He denies all psychiatric symptoms, despite ongoing symtpoms of psychosis/craig which remain fx/bx impairing (some improvement noted). He is unable to provide a narrative as to why he was hospitalized.     His thought process is concrete but less distractible, which still limits treatment/therapy efficacy a this time.  He is pleasant and having no bx issues at this time aside form having poor boundaries. He is compliant with treatment.     Collateral from his family reports that the patient has not been able to sustain more than 1-2 weeks out of the hospital; he is not stable enough to attend routine outpatient care (nearest available appointment is on 4/14/20). He requires an IOP to sustain stability.     He reports that he had a disagreement with staff over being told no- pt continues to have issues with boundaries and self-redirecting in face of frustration. "     Denied Symptoms of Depression: no diminished mood or loss of interest/anhedonia; no irritability, diminished energy, change in sleep, change in appetite, diminished concentration or cognition or indecisiveness, PMA/R, excessive guilt or hopelessness or worthlessness, or suicidal ideations     Continued but less Changes in Sleep: +trouble with initiation/maintenance, no early morning awakening with inability to return to sleep; +diminshed need for sleep; pt slept 5 hours last night     Denied Suicidal/Homicidal ideations: no active/passive ideations, organized plans, or future intentions     Continued but lessening Symptoms of psychosis: less hallucinations, no delusions, no disorganized thinking, no disorganized behavior or abnormal motor behavior, no negative symptoms      Continued but lessening Symptoms of craig or hypomania: no elevated, no expansive, or less irritable mood with less increased energy or activity; with no inflated self-esteem or grandiosity, less decreased need for sleep, no increased rate of speech, no FOI or racing thoughts, less distractibility, no increased goal directed activity or PMA, less risky/disinhibited behavior    Denied Symptoms of Anxiety: no excessive anxiety/worry/fear, no panic attacks; without agoraphobia    PSYCHOTHERAPY ADD-ON +[id number]   30 (16-37*) minutes    Time: 16 minutes  Participants: Met with patient    Therapeutic Intervention Type: behavior modifying psychotherapy, supportive psychotherapy  Why chosen therapy is appropriate versus another modality: relevant to diagnosis, patient responds to this modality, evidence based practice    Target symptoms: mood disorder, psychosis  Primary focus: psychosis, stressors, compliance  Psychotherapeutic techniques: supportive and psycho-dynamic techniques; psycho-education; motivational techniques for compliance; identifying and coping stressors  Outcome monitoring methods: self-report, observation    Patient's response to  intervention:  The patient's response to intervention is accepting.    Progress toward goals:  The patient's progress toward goals is limited.      ROS  General ROS: negative  Ophthalmic ROS: negative  ENT ROS: negative  Allergy and Immunology ROS: negative  Hematological and Lymphatic ROS: negative  Endocrine ROS: negative  Respiratory ROS: no cough, shortness of breath, or wheezing  Cardiovascular ROS: no chest pain or dyspnea on exertion  Gastrointestinal ROS: no abdominal pain, change in bowel habits, or black or bloody stools  Genito-Urinary ROS: no dysuria, trouble voiding, or hematuria  Musculoskeletal ROS: negative  Neurological ROS: no TIA or stroke symptoms  Dermatological ROS: negative      PAST MEDICAL HISTORY   Past Medical History:   Diagnosis Date    Anxiety     Bipolar 1 disorder     Bradycardia     Depression     Diabetes mellitus     Hallucination     History of psychiatric hospitalization     [place] 2/2020 and [facility] 3/2020    Hx of psychiatric care     Hypertension     Jacinta     Nuclear sclerosis of both eyes 10/22/2015    Psychiatric exam requested by authority     Psychiatric problem     Psychosis     Renal disorder     Schizoaffective disorder     Sleep difficulties     Therapy     Thyroid disease     possible           PSYCHOTROPIC MEDICATIONS   Scheduled Meds:   aspirin  81 mg Oral Daily    carBAMazepine  300 mg Oral BID    diphenhydrAMINE  50 mg Oral QHS    folic acid-vit B6-vit B12 2.5-25-2 mg  1 tablet Oral Daily    melatonin  6 mg Oral Nightly    metFORMIN  1,000 mg Oral BID WM    multivitamin  1 tablet Oral Daily    OLANZapine  20 mg Oral QHS    SAXagliptin  5 mg Oral Daily    simvastatin  20 mg Oral QHS     Continuous Infusions:  PRN Meds:.acetaminophen, aluminum-magnesium hydroxide-simethicone, docusate sodium, glucagon (human recombinant), glucose, glucose, glucose, hydrOXYzine pamoate, insulin aspart U-100, loperamide, OLANZapine **AND**  "OLANZapine        EXAMINATION    VITALS   Vitals:    04/03/20 0746   BP: 118/68   Pulse: 94   Resp: 20   Temp: 98 °F (36.7 °C)     Body mass index is 28.28 kg/m².      CONSTITUTIONAL  General Appearance: WM, in hospital/casual garb, overweight; NAD     MUSCULOSKELETAL  Muscle Strength and Tone:  normal  Abnormal Involuntary Movements:  None- no hand tremor today  Gait and Station:  normal; mildly ataxic     PSYCHIATRIC   Level of Consciousness: awake, alert  Orientation: p/p/t/s  Grooming:  adequate to circumstances  Psychomotor Behavior: no PMA/R  Speech: nl r/t/v/s  Language:  English fluent  Mood: "ok"  Affect: improving range, less irritable, more pleasant/calmer  Thought Process: mostly linear and organized; less racing thoughts at times; less derailed at times  Associations:  intact; no CARLEY  Thought Content:  denied AVH/delusions; denied HI/SI  Memory:  intact to recent and remote events; 3/3 immediate; 2/3 at 3 minutes  Attention: mostly intact to conversation; less distractible   Fund of Knowledge: about age and education appropriate; 2/4 recent presidents; aware of current events  Estimate if Intelligence:  average based on work/education history, vocabulary and mental status exam  Insight:  fair- partially seeks help, understands/accepts illness  Judgment:   good- no bx issues, compliant and cooperative        DIAGNOSTIC TESTING   Laboratory Results  Recent Results (from the past 24 hour(s))   POCT glucose    Collection Time: 04/02/20  4:33 PM   Result Value Ref Range    POCT Glucose 135 (H) 70 - 110 mg/dL   POCT glucose    Collection Time: 04/02/20  8:30 PM   Result Value Ref Range    POCT Glucose 143 (H) 70 - 110 mg/dL   POCT glucose    Collection Time: 04/03/20  7:58 AM   Result Value Ref Range    POCT Glucose 153 (H) 70 - 110 mg/dL         ASSESSMENT      IMPRESSION   Bipolar I Disorder MRE manic, severe with psychotic features  Insomnia secondary to a mental illness     EPS     Psychosocial " stressors     Vitamin D insufficiency  B12 deficiency      Acute renal insufficiency  Hyponatremia  Abnormal TSH  Mixed hyperlipidemia  Uncontrolled type 2 diabetes mellitus with hyperglycemia, without long-term current use of insulin  Essential hypertension     MEDICAL DECISION MAKING       PROBLEM LIST AND MANAGEMENT PLANS; PRESCRIPTION DRUG MANAGEMENT  Compliance: yes  Side Effects: no  Regimen Adjustments:      Bipolar I Disorder mre manic, severe with psychotic features: pt counseled  -Start re-trial of Tegretol CR at 100 mg po BID- increase to 200 mg po BID (last known stabilizing dosage)- increase to 300 mg po BID; increase to 400 mg po BID (check level in 4 days)  -Start re-trial of Zyprexa 5 mg po q HS- increase to 10 mg po q HS- increase to 15 mg po q HS- increase to 20 mg po q HS- increase to 25 mg po q HS- increase to/continue 30 mg po q HS- decrease back to 25 mg po q HS- decrease back to 20 mg po q HS (no benefit derived form higher dosage)  -we discussed his medication options in depth; the above medications were the only ones he was willing to try at this time     Insomnia: pt counseled  -start melatonin 6 mg po q hs  -Started/conitnue Benadryl at 50 mg pop q HS    EPS: pt counseled  -benadryl as above     Psychosocial stressors: Pt counseled  -SW consulted and assised with resources     Vitamin D insufficiency: Pt counseled  -Started/continue Vitamin D3 50,000 units po q week x 8 weeks (1/8 given)     B12 deficiency:  Pt counseled  -Gave B12 1000 mcg IM x 1 on 3/24/20  -Start/continue Folbic po q day     Acute renal insufficiency: Pt counseled  -was likely from poor PO intake   -improved/resolved     Hyponatremia: pt counseled  -rechecked- resolved     Abnormal TSH:pt counseled  -rechecked and normalized     Mixed hyperlipidemia: pt counseled  Resume/continue simvastatin 20 mg po q day     Uncontrolled type 2 diabetes mellitus with hyperglycemia, without long-term current use of insulin: pt  counseled  -resumed/continue metformin 1000 mg po BID with meals and Saxagliptim 5 mg po q day     Essential hypertension: pt counseled  Hold lisinopril for now; monitor BP closely and resume if needed        DIAGNOSTIC TESTING  Labs reviewed with patient; follow up pending labs- check  CBC, CMP, and Tegretol level in 4 days     Disposition:  -SW to assist with aftercare planning and collateral  -Once stable discharge home with outpatient follow up care and/or rehab  -Continue inpatient treatment under a PEC and/or CEC for grave disability as evident by fx/bx impairing mood and psychotic symptoms    NEED FOR CONTINUED HOSPITALIZATION  Psychiatric illness continues to pose a potential threat to life or bodily function, of self or others, thereby requiring the need for continued inpatient psychiatric hospitalization: Yes    Protective inpatient pyschiatric hospitalization required while a safe disposition plan is enacted: Yes    Patient stabilized and ready for discharge from inpatient psychiatric unit: No      STAFF:   Bao Naranjo MD  Psychiatry

## 2020-04-03 NOTE — PROGRESS NOTES
"   04/03/20 1030   [facility] Group Therapy   Group Name Therapeutic Recreation   Specific Interventions Skilled Activity Creative Expression   Participation Level Active;Appropriate;Attentive   Participation Quality Cooperative;Social   Insight/Motivation Applies New Skills;Good   Affect/Mood Display Appropriate   Cognition Alert   Psychomotor WNL   Patient states coloring and doing puzzles helps him "relax and keeps him focused. It's become something I enjoy." After group patient in the darby singing to a female in low tone "You are so beautiful to me."  "

## 2020-04-03 NOTE — PLAN OF CARE
Plan of care reviewed. Denies intent to harm self or others at this time. Accepts snacks and medications. Gait steady, no falls today. Interacting with staff and peers. Promoted individualized safety plan, reality-based interactions, effective coping strategies, and impulse control. Will continue precautions and monitor for safety.

## 2020-04-03 NOTE — NURSING
"Pt  In dayroom , sitting at table with peers at same table, drowsy and appearing to be nodding off.  When questioned pt denies this and states "All I need is my 4 hours."   When this writer ask the female peer sitting at the same table how she is doing, pt also commented that she is his new friend.  Will continue to monitor.  Safety and precautions maintained, bed low and pathway kept clear.  "

## 2020-04-03 NOTE — NURSING
Lying quietly in bed, eyes closed, respirations even, unlabored. Apparently asleep. Awoke during the night and walked in darby before returning to bed and falling asleep. Slept about 4.5 hours thus far. Safety precautions maintained. Rounds done every 15 minutes. Bed is fixed in low position and room is uncluttered and pathways are clear.

## 2020-04-04 LAB
POCT GLUCOSE: 115 MG/DL (ref 70–110)
POCT GLUCOSE: 162 MG/DL (ref 70–110)
POCT GLUCOSE: 201 MG/DL (ref 70–110)
POCT GLUCOSE: 232 MG/DL (ref 70–110)

## 2020-04-04 PROCEDURE — 11400000 HC PSYCH PRIVATE ROOM

## 2020-04-04 PROCEDURE — 99233 SBSQ HOSP IP/OBS HIGH 50: CPT | Mod: ,,, | Performed by: PSYCHIATRY & NEUROLOGY

## 2020-04-04 PROCEDURE — 25000003 PHARM REV CODE 250: Performed by: PSYCHIATRY & NEUROLOGY

## 2020-04-04 PROCEDURE — 99233 PR SUBSEQUENT HOSPITAL CARE,LEVL III: ICD-10-PCS | Mod: ,,, | Performed by: PSYCHIATRY & NEUROLOGY

## 2020-04-04 RX ADMIN — CARBAMAZEPINE 400 MG: 400 TABLET, EXTENDED RELEASE ORAL at 08:04

## 2020-04-04 RX ADMIN — Medication 6 MG: at 08:04

## 2020-04-04 RX ADMIN — INSULIN ASPART 2 UNITS: 100 INJECTION, SOLUTION INTRAVENOUS; SUBCUTANEOUS at 11:04

## 2020-04-04 RX ADMIN — INSULIN ASPART 2 UNITS: 100 INJECTION, SOLUTION INTRAVENOUS; SUBCUTANEOUS at 05:04

## 2020-04-04 RX ADMIN — THERA TABS 1 TABLET: TAB at 08:04

## 2020-04-04 RX ADMIN — HYDROXYZINE PAMOATE 50 MG: 50 CAPSULE ORAL at 10:04

## 2020-04-04 RX ADMIN — METFORMIN HYDROCHLORIDE 1000 MG: 500 TABLET ORAL at 07:04

## 2020-04-04 RX ADMIN — SIMVASTATIN 20 MG: 10 TABLET, FILM COATED ORAL at 08:04

## 2020-04-04 RX ADMIN — OLANZAPINE 20 MG: 10 TABLET, FILM COATED ORAL at 08:04

## 2020-04-04 RX ADMIN — Medication 1 TABLET: at 08:04

## 2020-04-04 RX ADMIN — DIPHENHYDRAMINE HYDROCHLORIDE 75 MG: 25 CAPSULE ORAL at 08:04

## 2020-04-04 RX ADMIN — SAXAGLIPTIN 5 MG: 2.5 TABLET, FILM COATED ORAL at 08:04

## 2020-04-04 RX ADMIN — METFORMIN HYDROCHLORIDE 1000 MG: 500 TABLET ORAL at 04:04

## 2020-04-04 RX ADMIN — ASPIRIN 81 MG: 81 TABLET, COATED ORAL at 08:04

## 2020-04-04 NOTE — PLAN OF CARE
Pt out on unit majority of shift.Pt reports he slept 7 hours last night which he says is good for him.Appetite is good.Hygiene and grooming fair.No acting out behavior and mood stable.Medication compliant.

## 2020-04-04 NOTE — PLAN OF CARE
"Plan of care reviewed. Denies intent to harm self or others at this time. Accepts snacks and medications. Gait steady, no falls. Interacting with staff and peers. When asked where he will go when he is discharged stated, "I'm not thinking of discharge." Promoted individualized safety plan, reality-based interactions, effective coping strategies, and impulse control. Will continue precautions and monitor for safety.   "

## 2020-04-04 NOTE — PROGRESS NOTES
"PSYCHIATRY DAILY INPATIENT PROGRESS NOTE  SUBSEQUENT HOSPITAL VISIT    ENCOUNTER DATE: 4/4/2020  SITE: [facility]    DATE OF ADMISSION: 3/24/2020 12:41 AM  LENGTH OF STAY: 11 days    HISTORY    CHIEF COMPLAINT   Chau Lovett is a 59 y.o. male, seen during daily contreras rounds on the inpatient unit.  Chau Lovett presents with the chief complaint of psychosis/hallucinations, "I have been falling."    HPI   (Elements: Location, Quality, Severity, Duration, Timing, Content, Modifying Factors, Associated Signs & Symptoms)    The patient was seen and examined. The chart was reviewed.     Reviewed notes by LMSW, CTRS, and RN from the last 24 hours.    The patient's case was discussed with the treatment team and care providers today, including RNs.    Staff reports no behavioral or management issues.     The patient has been compliant with treatment. The patient denied any side effects.    The patient continues to lack insight into his illness. He denies all psychiatric symptoms, despite ongoing symtpoms of psychosis/craig which remain fx/bx impairing (some improvement noted). He is unable to provide a narrative as to why he was hospitalized.     His thought process is concrete but less distractible, which still limits treatment/therapy efficacy a this time.  He is pleasant and having no bx issues at this time aside form having poor boundaries. He is compliant with treatment.     Collateral from his family reports that the patient has not been able to sustain more than 1-2 weeks out of the hospital; he is not stable enough to attend routine outpatient care (nearest available appointment is on 4/14/20). He requires an IOP to sustain stability.     Symptoms are unchanged form yesterday as documented below    Denied Symptoms of Depression: no diminished mood or loss of interest/anhedonia; no irritability, diminished energy, change in sleep, change in appetite, diminished concentration or cognition or " indecisiveness, PMA/R, excessive guilt or hopelessness or worthlessness, or suicidal ideations     Continued but less Changes in Sleep: +trouble with initiation/maintenance, no early morning awakening with inability to return to sleep; +diminshed need for sleep; pt slept 7.5 hours last night     Denied Suicidal/Homicidal ideations: no active/passive ideations, organized plans, or future intentions     Continued but lessening Symptoms of psychosis: less hallucinations, no delusions, no disorganized thinking, no disorganized behavior or abnormal motor behavior, no negative symptoms      Continued but lessening Symptoms of craig or hypomania: no elevated, no expansive, or less irritable mood with less increased energy or activity; with no inflated self-esteem or grandiosity, less decreased need for sleep, no increased rate of speech, no FOI or racing thoughts, less distractibility, no increased goal directed activity or PMA, less risky/disinhibited behavior    Denied Symptoms of Anxiety: no excessive anxiety/worry/fear, no panic attacks; without agoraphobia      ROS  General ROS: negative  Ophthalmic ROS: negative  ENT ROS: negative  Allergy and Immunology ROS: negative  Hematological and Lymphatic ROS: negative  Endocrine ROS: negative  Respiratory ROS: no cough, shortness of breath, or wheezing  Cardiovascular ROS: no chest pain or dyspnea on exertion  Gastrointestinal ROS: no abdominal pain, change in bowel habits, or black or bloody stools  Genito-Urinary ROS: no dysuria, trouble voiding, or hematuria  Musculoskeletal ROS: negative  Neurological ROS: no TIA or stroke symptoms  Dermatological ROS: negative      PAST MEDICAL HISTORY   Past Medical History:   Diagnosis Date    Anxiety     Bipolar 1 disorder     Bradycardia     Depression     Diabetes mellitus     Hallucination     History of psychiatric hospitalization     [place] 2/2020 and [facility] 3/2020    Hx of psychiatric care     Hypertension      "Jacinta     Nuclear sclerosis of both eyes 10/22/2015    Psychiatric exam requested by authority     Psychiatric problem     Psychosis     Renal disorder     Schizoaffective disorder     Sleep difficulties     Therapy     Thyroid disease     possible           PSYCHOTROPIC MEDICATIONS   Scheduled Meds:   aspirin  81 mg Oral Daily    carBAMazepine  400 mg Oral BID    diphenhydrAMINE  50 mg Oral QHS    folic acid-vit B6-vit B12 2.5-25-2 mg  1 tablet Oral Daily    melatonin  6 mg Oral Nightly    metFORMIN  1,000 mg Oral BID WM    multivitamin  1 tablet Oral Daily    OLANZapine  20 mg Oral QHS    SAXagliptin  5 mg Oral Daily    simvastatin  20 mg Oral QHS     Continuous Infusions:  PRN Meds:.acetaminophen, aluminum-magnesium hydroxide-simethicone, docusate sodium, glucagon (human recombinant), glucose, glucose, glucose, hydrOXYzine pamoate, insulin aspart U-100, loperamide, OLANZapine **AND** OLANZapine        EXAMINATION    VITALS   Vitals:    04/04/20 0738   BP: 118/85   Pulse: 105   Resp: 18   Temp: 96.8 °F (36 °C)     Body mass index is 28.28 kg/m².      CONSTITUTIONAL  General Appearance: WM, in hospital/casual garb, overweight; NAD     MUSCULOSKELETAL  Muscle Strength and Tone:  normal  Abnormal Involuntary Movements:  None- no hand tremor today  Gait and Station:  normal; mildly ataxic     PSYCHIATRIC   Level of Consciousness: awake, alert  Orientation: p/p/t/s  Grooming:  adequate to circumstances  Psychomotor Behavior: no PMA/R  Speech: nl r/t/v/s  Language:  English fluent  Mood: "alright.. I love you"  Affect: improving range, less irritable, more pleasant/calmer  Thought Process: mostly linear and organized; less racing thoughts at times; less derailed at times  Associations:  intact; no CARLEY  Thought Content:  denied AVH/delusions; denied HI/SI  Memory:  intact to recent and remote events; 3/3 immediate; 2/3 at 3 minutes  Attention: mostly intact to conversation; less distractible   Fund of " Knowledge: about age and education appropriate; 2/4 recent presidents; aware of current events  Estimate if Intelligence:  average based on work/education history, vocabulary and mental status exam  Insight:  fair- partially seeks help, understands/accepts illness  Judgment:   good- no bx issues, compliant and cooperative        DIAGNOSTIC TESTING   Laboratory Results  Recent Results (from the past 24 hour(s))   POCT glucose    Collection Time: 04/03/20  4:28 PM   Result Value Ref Range    POCT Glucose 188 (H) 70 - 110 mg/dL   POCT glucose    Collection Time: 04/03/20  8:50 PM   Result Value Ref Range    POCT Glucose 164 (H) 70 - 110 mg/dL   POCT glucose    Collection Time: 04/04/20  7:46 AM   Result Value Ref Range    POCT Glucose 162 (H) 70 - 110 mg/dL         ASSESSMENT      IMPRESSION   Bipolar I Disorder MRE manic, severe with psychotic features  Insomnia secondary to a mental illness     EPS     Psychosocial stressors     Vitamin D insufficiency  B12 deficiency      Acute renal insufficiency  Hyponatremia  Abnormal TSH  Mixed hyperlipidemia  Uncontrolled type 2 diabetes mellitus with hyperglycemia, without long-term current use of insulin  Essential hypertension     MEDICAL DECISION MAKING       PROBLEM LIST AND MANAGEMENT PLANS; PRESCRIPTION DRUG MANAGEMENT  Compliance: yes  Side Effects: no  Regimen Adjustments:      Bipolar I Disorder mre manic, severe with psychotic features: pt counseled  -Start re-trial of Tegretol CR at 100 mg po BID- increase to 200 mg po BID (last known stabilizing dosage)- increase to 300 mg po BID; increase to 400 mg po BID (checck level on Wednesday)  -Start re-trial of Zyprexa 5 mg po q HS- increase to 10 mg po q HS- increase to 15 mg po q HS- increase to 20 mg po q HS- increase to 25 mg po q HS- increase to/continue 30 mg po q HS- decrease back to 25 mg po q HS- decrease back to 20 mg po q HS (no benefit derived form higher dosage)  -we discussed his medication options in depth;  the above medications were the only ones he was willing to try at this time     Insomnia: pt counseled  -start melatonin 6 mg po q hs  -Started/conitnue Benadryl at 50 mg pop q HS- increase to 75 mg po q HS    EPS: pt counseled  -benadryl as above     Psychosocial stressors: Pt counseled  -SW consulted and assised with resources     Vitamin D insufficiency: Pt counseled  -Started/continue Vitamin D3 50,000 units po q week x 8 weeks (1/8 given)     B12 deficiency:  Pt counseled  -Gave B12 1000 mcg IM x 1 on 3/24/20  -Start/continue Folbic po q day     Acute renal insufficiency: Pt counseled  -was likely from poor PO intake   -improved/resolved     Hyponatremia: pt counseled  -rechecked- resolved     Abnormal TSH:pt counseled  -rechecked and normalized     Mixed hyperlipidemia: pt counseled  Resume/continue simvastatin 20 mg po q day     Uncontrolled type 2 diabetes mellitus with hyperglycemia, without long-term current use of insulin: pt counseled  -resumed/continue metformin 1000 mg po BID with meals and Saxagliptim 5 mg po q day     Essential hypertension: pt counseled  Hold lisinopril for now; monitor BP closely and resume if needed        DIAGNOSTIC TESTING  Labs reviewed with patient; follow up pending labs- check  CBC, CMP, and Tegretol level On Wednesday     Disposition:  -SW to assist with aftercare planning and collateral  -Once stable discharge home with outpatient follow up care and/or rehab  -Continue inpatient treatment under a PEC and/or CEC for grave disability as evident by fx/bx impairing mood and psychotic symptoms    NEED FOR CONTINUED HOSPITALIZATION  Psychiatric illness continues to pose a potential threat to life or bodily function, of self or others, thereby requiring the need for continued inpatient psychiatric hospitalization: Yes    Protective inpatient pyschiatric hospitalization required while a safe disposition plan is enacted: Yes    Patient stabilized and ready for discharge from  inpatient psychiatric unit: No      STAFF:   Bao Naranjo MD  Psychiatry

## 2020-04-04 NOTE — NURSING
Lying quietly in bed, eyes closed, respirations even, unlabored. Apparently asleep. Awoke during the night but was able to go back to sleep. Slept 7.5 hours thus far. Safety precautions maintained. Rounds done every 15 minutes. Bed is fixed in low position and room is uncluttered and pathways are clear.

## 2020-04-05 LAB
POCT GLUCOSE: 136 MG/DL (ref 70–110)
POCT GLUCOSE: 140 MG/DL (ref 70–110)
POCT GLUCOSE: 156 MG/DL (ref 70–110)
POCT GLUCOSE: 178 MG/DL (ref 70–110)

## 2020-04-05 PROCEDURE — 99233 SBSQ HOSP IP/OBS HIGH 50: CPT | Mod: ,,, | Performed by: PSYCHIATRY & NEUROLOGY

## 2020-04-05 PROCEDURE — 25000003 PHARM REV CODE 250: Performed by: PSYCHIATRY & NEUROLOGY

## 2020-04-05 PROCEDURE — 11400000 HC PSYCH PRIVATE ROOM

## 2020-04-05 PROCEDURE — 99233 PR SUBSEQUENT HOSPITAL CARE,LEVL III: ICD-10-PCS | Mod: ,,, | Performed by: PSYCHIATRY & NEUROLOGY

## 2020-04-05 RX ORDER — DIPHENHYDRAMINE HCL 50 MG
100 CAPSULE ORAL NIGHTLY
Status: DISCONTINUED | OUTPATIENT
Start: 2020-04-05 | End: 2020-04-08 | Stop reason: HOSPADM

## 2020-04-05 RX ADMIN — METFORMIN HYDROCHLORIDE 1000 MG: 500 TABLET ORAL at 07:04

## 2020-04-05 RX ADMIN — ASPIRIN 81 MG: 81 TABLET, COATED ORAL at 08:04

## 2020-04-05 RX ADMIN — SAXAGLIPTIN 5 MG: 2.5 TABLET, FILM COATED ORAL at 08:04

## 2020-04-05 RX ADMIN — DIPHENHYDRAMINE HYDROCHLORIDE 100 MG: 50 CAPSULE ORAL at 08:04

## 2020-04-05 RX ADMIN — THERA TABS 1 TABLET: TAB at 08:04

## 2020-04-05 RX ADMIN — Medication 6 MG: at 08:04

## 2020-04-05 RX ADMIN — CARBAMAZEPINE 400 MG: 400 TABLET, EXTENDED RELEASE ORAL at 08:04

## 2020-04-05 RX ADMIN — SIMVASTATIN 20 MG: 10 TABLET, FILM COATED ORAL at 08:04

## 2020-04-05 RX ADMIN — METFORMIN HYDROCHLORIDE 1000 MG: 500 TABLET ORAL at 05:04

## 2020-04-05 RX ADMIN — Medication 1 TABLET: at 08:04

## 2020-04-05 RX ADMIN — OLANZAPINE 20 MG: 10 TABLET, FILM COATED ORAL at 08:04

## 2020-04-05 NOTE — PLAN OF CARE
Pt is awake, resting in bed at this time and has slept 3.5 hours intermittently.  In and out of room, manic, restless, irritable at times.  Vistaril po given at 2218.  NAD.  Resp even & unlabored.  Pathways clear and bed in low position.  Q 15 minute safety checks ongoing.  All precautions maintained

## 2020-04-05 NOTE — PLAN OF CARE
Problem: Adult Behavioral Health Plan of Care  Goal: Plan of Care Review  Outcome: Ongoing, Progressing  Goal: Patient-Specific Goal (Individualization)  Outcome: Ongoing, Progressing  Goal: Adheres to Safety Considerations for Self and Others  Outcome: Ongoing, Progressing  Goal: Optimized Coping Skills in Response to Life Stressors  Outcome: Ongoing, Progressing  Goal: Develops/Participates in Therapeutic [place] to Support Successful Transition  Outcome: Ongoing, Progressing  Goal: Rounds/Family Conference  Outcome: Ongoing, Progressing     Problem: Diabetes Comorbidity  Goal: Blood Glucose Level Within Desired Range  Outcome: Ongoing, Progressing     Problem: Sleep Impairment (Psychotic Signs/Symptoms)  Goal: Improved Sleep Hygiene (Psychotic Signs/Symptoms)  Outcome: Ongoing, Progressing     Problem: Sensory Perception Impairment (Psychotic Signs/Symptoms)  Goal: Decreased Frequency and Intensity of Sensory Symptoms (Psychotic Signs/Symptoms)  Outcome: Ongoing, Progressing     Problem: Fall Injury Risk  Goal: Absence of Fall and Fall-Related Injury  Outcome: Ongoing, Progressing     Problem: Fall Injury Risk  Goal: Absence of Fall and Fall-Related Injury  Outcome: Ongoing, Progressing

## 2020-04-05 NOTE — PLAN OF CARE
Pt out on unit.Pt asked how he slept last night and he responded ok.Staff report pt only slept 3.5 hours.Appetite adequate and grooming poor.Pt pacing halls.Pt asked if he was exercising and he stated he didn't know why he was walking so much.Pt spending time coloring.Cooperative and medication compliant.

## 2020-04-05 NOTE — NURSING
"Pt has been pleasant, smiling, wanting to fistbump staff.  Encouraged pt to remember 6 foot spacing due to caronavirus. Says he had a "great day."  Rated depression 8/10, did not elaborate reason.  Rated anxiety 0/10.  Denied SI/HI/hallucinations.  Did not know the date.  Says he thinks he will be discharged "in 10 days-Thursday." Pt increasingly manic, pacing hallway, elevated mood, always smiling.  Cooperative until 10PM bedtime.  Pt refused to stay in room saying he had to make walks to help.  Pt got agitated when staff reminded pt of rule to stay in room after 10.  Vistaril po provided.  Pt said he needed to make one last walk.  Returned and said he needed to take one last one.  Pt was told no and directed to bed, where he did fall asleep with the light on sitting up in bed.  Will continue to monitor for safety.  "

## 2020-04-05 NOTE — PROGRESS NOTES
"PSYCHIATRY DAILY INPATIENT PROGRESS NOTE  SUBSEQUENT HOSPITAL VISIT    ENCOUNTER DATE: 4/5/2020  SITE: [facility]    DATE OF ADMISSION: 3/24/2020 12:41 AM  LENGTH OF STAY: 12 days    HISTORY    CHIEF COMPLAINT   Chau Lovett is a 59 y.o. male, seen during daily contreras rounds on the inpatient unit.  Chau Lovett presents with the chief complaint of psychosis/hallucinations, "I have been falling."    HPI   (Elements: Location, Quality, Severity, Duration, Timing, Content, Modifying Factors, Associated Signs & Symptoms)    The patient was seen and examined. The chart was reviewed.     Reviewed notes by  RNs from the last 24 hours.    The patient's case was discussed with the treatment team and care providers today, including RNs.    Staff reports no behavioral or management issues.     The patient has been compliant with treatment. The patient denied any side effects.    The patient continues to lack insight into his illness. He denies all psychiatric symptoms, despite ongoing symtpoms of psychosis/craig which remain fx/bx impairing (some improvement noted since admission). He is unable to provide a narrative as to why he was hospitalized.     His thought process is concrete but less distractible, which still limits treatment/therapy efficacy a this time.  He is pleasant and having no bx issues at this time aside form having poor boundaries. He is compliant with treatment.     Collateral from his family reports that the patient has not been able to sustain more than 1-2 weeks out of the hospital; he is not stable enough to attend routine outpatient care (nearest available appointment is on 4/14/20). He requires an IOP to sustain stability.     Symptoms are unchanged form yesterday as documented below. He was happy to talk with his cousin.     Denied Symptoms of Depression: no diminished mood or loss of interest/anhedonia; no irritability, diminished energy, change in sleep, change in appetite, " diminished concentration or cognition or indecisiveness, PMA/R, excessive guilt or hopelessness or worthlessness, or suicidal ideations     Continued but less Changes in Sleep: +trouble with initiation/maintenance, no early morning awakening with inability to return to sleep; +diminshed need for sleep; pt slept 3.5 hours last night     Denied Suicidal/Homicidal ideations: no active/passive ideations, organized plans, or future intentions     Continued but lessening Symptoms of psychosis: less hallucinations, no delusions, no disorganized thinking, no disorganized behavior or abnormal motor behavior, no negative symptoms      Improving Symptoms of craig or hypomania: no elevated, no expansive, no irritable mood with less increased energy or activity; with no inflated self-esteem or grandiosity, less decreased need for sleep, no increased rate of speech, no FOI or racing thoughts, less distractibility, no increased goal directed activity or PMA, less risky/disinhibited behavior    Denied Symptoms of Anxiety: no excessive anxiety/worry/fear, no panic attacks; without agoraphobia      ROS  General ROS: negative  Ophthalmic ROS: negative  ENT ROS: negative  Allergy and Immunology ROS: negative  Hematological and Lymphatic ROS: negative  Endocrine ROS: negative  Respiratory ROS: no cough, shortness of breath, or wheezing  Cardiovascular ROS: no chest pain or dyspnea on exertion  Gastrointestinal ROS: no abdominal pain, change in bowel habits, or black or bloody stools  Genito-Urinary ROS: no dysuria, trouble voiding, or hematuria  Musculoskeletal ROS: negative  Neurological ROS: no TIA or stroke symptoms  Dermatological ROS: negative      PAST MEDICAL HISTORY   Past Medical History:   Diagnosis Date    Anxiety     Bipolar 1 disorder     Bradycardia     Depression     Diabetes mellitus     Hallucination     History of psychiatric hospitalization     [facility] 2/2020 and [facility]    [facility] psychiatric care   "   Hypertension     [place]     Nuclear sclerosis of both eyes 10/22/2015    Psychiatric exam requested by authority     Psychiatric problem     Psychosis     Renal disorder     Schizoaffective disorder     Sleep difficulties     Therapy     Thyroid disease     possible           PSYCHOTROPIC MEDICATIONS   Scheduled Meds:   aspirin  81 mg Oral Daily    carBAMazepine  400 mg Oral BID    diphenhydrAMINE  75 mg Oral QHS    folic acid-vit B6-vit B12 2.5-25-2 mg  1 tablet Oral Daily    melatonin  6 mg Oral Nightly    metFORMIN  1,000 mg Oral BID WM    multivitamin  1 tablet Oral Daily    OLANZapine  20 mg Oral QHS    SAXagliptin  5 mg Oral Daily    simvastatin  20 mg Oral QHS     Continuous Infusions:  PRN Meds:.acetaminophen, aluminum-magnesium hydroxide-simethicone, docusate sodium, glucagon (human recombinant), glucose, glucose, glucose, hydrOXYzine pamoate, insulin aspart U-100, loperamide, OLANZapine **AND** OLANZapine        EXAMINATION    VITALS   Vitals:    04/05/20 0812   BP: 132/70   Pulse: 108   Resp: 20   Temp: 96.7 °F (35.9 °C)     Body mass index is 28.28 kg/m².      CONSTITUTIONAL  General Appearance: WM, in hospital/casual garb, overweight; NAD     MUSCULOSKELETAL  Muscle Strength and Tone:  normal  Abnormal Involuntary Movements:  None- no hand tremor today  Gait and Station:  normal; mildly ataxic     PSYCHIATRIC   Level of Consciousness: awake, alert  Orientation: p/p/t/s  Grooming:  adequate to circumstances  Psychomotor Behavior: no PMA/R  Speech: nl r/t/v/s  Language:  English fluent  Mood: "ok"  Affect: improving range, less irritable, more pleasant/calmer  Thought Process: mostly linear and organized; less racing thoughts at times; less derailed at times  Associations:  intact; no CARLEY  Thought Content:  denied AVH/delusions; denied HI/SI  Memory:  intact to recent and remote events; 3/3 immediate; 2/3 at 3 minutes  Attention: mostly intact to conversation; less distractible "   Fund of Knowledge: about age and education appropriate; 2/4 recent presidents; aware of current events  Estimate if Intelligence:  average based on work/education history, vocabulary and mental status exam  Insight:  fair- partially seeks help, understands/accepts illness  Judgment:   good- no bx issues, compliant and cooperative        DIAGNOSTIC TESTING   Laboratory Results  Recent Results (from the past 24 hour(s))   POCT glucose    Collection Time: 04/04/20 11:35 AM   Result Value Ref Range    POCT Glucose 232 (H) 70 - 110 mg/dL   POCT glucose    Collection Time: 04/04/20  4:57 PM   Result Value Ref Range    POCT Glucose 201 (H) 70 - 110 mg/dL   POCT glucose    Collection Time: 04/04/20  7:49 PM   Result Value Ref Range    POCT Glucose 115 (H) 70 - 110 mg/dL   POCT glucose    Collection Time: 04/05/20  7:31 AM   Result Value Ref Range    POCT Glucose 178 (H) 70 - 110 mg/dL         ASSESSMENT      IMPRESSION   Bipolar I Disorder MRE manic, severe with psychotic features  Insomnia secondary to a mental illness     EPS     Psychosocial stressors     Vitamin D insufficiency  B12 deficiency      Acute renal insufficiency  Hyponatremia  Abnormal TSH  Mixed hyperlipidemia  Uncontrolled type 2 diabetes mellitus with hyperglycemia, without long-term current use of insulin  Essential hypertension     MEDICAL DECISION MAKING       PROBLEM LIST AND MANAGEMENT PLANS; PRESCRIPTION DRUG MANAGEMENT  Compliance: yes  Side Effects: no  Regimen Adjustments:      Bipolar I Disorder mre manic, severe with psychotic features: pt counseled  -Start re-trial of Tegretol CR at 100 mg po BID- increase to 200 mg po BID (last known stabilizing dosage)- increase to 300 mg po BID; increased to/conitnue at 400 mg po BID (checck level on Wednesday)  -Start re-trial of Zyprexa 5 mg po q HS- increase to 10 mg po q HS- increase to 15 mg po q HS- increase to 20 mg po q HS- increase to 25 mg po q HS- increase to/continue 30 mg po q HS- decrease  back to 25 mg po q HS- decrease back to 20 mg po q HS (no benefit derived form higher dosage)  -we discussed his medication options in depth; the above medications were the only ones he was willing to try at this time     Insomnia: pt counseled  -start melatonin 6 mg po q hs  -Started/conitnue Benadryl at 50 mg pop q HS- increase to 75 mg po q HS- increase to 100 mg po q HS    EPS: pt counseled  -benadryl as above     Psychosocial stressors: Pt counseled  -SW consulted and assisted with resources     Vitamin D insufficiency: Pt counseled  -Started/continue Vitamin D3 50,000 units po q week x 8 weeks (1/8 given)     B12 deficiency:  Pt counseled  -Gave B12 1000 mcg IM x 1 on 3/24/20  -Start/continue Folbic po q day     Acute renal insufficiency: Pt counseled  -was likely from poor PO intake   -improved/resolved     Hyponatremia: pt counseled  -rechecked- resolved     Abnormal TSH:pt counseled  -rechecked and normalized     Mixed hyperlipidemia: pt counseled  Resume/continue simvastatin 20 mg po q day     Uncontrolled type 2 diabetes mellitus with hyperglycemia, without long-term current use of insulin: pt counseled  -resumed/continue metformin 1000 mg po BID with meals and Saxagliptim 5 mg po q day     Essential hypertension: pt counseled  Hold lisinopril for now; monitor BP closely and resume if needed        DIAGNOSTIC TESTING  Labs reviewed with patient; follow up pending labs- check  CBC, CMP, and Tegretol level on Wednesday     Disposition:  -SW to assist with aftercare planning and collateral  -Once stable discharge home with outpatient follow up care and/or rehab  -Continue inpatient treatment under a PEC and/or C[facility] for grave disability as evident by fx/bx impairing mood and psychotic symptoms    NEED FOR CONTINUED HOSPITALIZATION  Psychiatric illness continues to pose a potential threat to life or bodily function, of self or others, thereby requiring the need for continued inpatient psychiatric  hospitalization: Yes    Protective inpatient pyschiatric hospitalization required while a safe disposition plan is enacted: Yes    Patient stabilized and ready for discharge from inpatient psychiatric unit: No      STAFF:   Bao Naranjo MD  Psychiatry

## 2020-04-06 LAB
POCT GLUCOSE: 171 MG/DL (ref 70–110)
POCT GLUCOSE: 173 MG/DL (ref 70–110)
POCT GLUCOSE: 189 MG/DL (ref 70–110)

## 2020-04-06 PROCEDURE — U0002 COVID-19 LAB TEST NON-CDC: HCPCS

## 2020-04-06 PROCEDURE — 90833 PSYTX W PT W E/M 30 MIN: CPT | Mod: ,,, | Performed by: PSYCHIATRY & NEUROLOGY

## 2020-04-06 PROCEDURE — 90833 PR PSYCHOTHERAPY W/PATIENT W/E&M, 30 MIN (ADD ON): ICD-10-PCS | Mod: ,,, | Performed by: PSYCHIATRY & NEUROLOGY

## 2020-04-06 PROCEDURE — 99233 PR SUBSEQUENT HOSPITAL CARE,LEVL III: ICD-10-PCS | Mod: ,,, | Performed by: PSYCHIATRY & NEUROLOGY

## 2020-04-06 PROCEDURE — 99233 SBSQ HOSP IP/OBS HIGH 50: CPT | Mod: ,,, | Performed by: PSYCHIATRY & NEUROLOGY

## 2020-04-06 PROCEDURE — 11400000 HC PSYCH PRIVATE ROOM

## 2020-04-06 PROCEDURE — 25000003 PHARM REV CODE 250: Performed by: PSYCHIATRY & NEUROLOGY

## 2020-04-06 RX ADMIN — Medication 1 TABLET: at 08:04

## 2020-04-06 RX ADMIN — CARBAMAZEPINE 400 MG: 400 TABLET, EXTENDED RELEASE ORAL at 08:04

## 2020-04-06 RX ADMIN — Medication 6 MG: at 08:04

## 2020-04-06 RX ADMIN — METFORMIN HYDROCHLORIDE 1000 MG: 500 TABLET ORAL at 08:04

## 2020-04-06 RX ADMIN — THERA TABS 1 TABLET: TAB at 08:04

## 2020-04-06 RX ADMIN — HYDROXYZINE PAMOATE 50 MG: 50 CAPSULE ORAL at 06:04

## 2020-04-06 RX ADMIN — DIPHENHYDRAMINE HYDROCHLORIDE 100 MG: 50 CAPSULE ORAL at 08:04

## 2020-04-06 RX ADMIN — SIMVASTATIN 20 MG: 10 TABLET, FILM COATED ORAL at 10:04

## 2020-04-06 RX ADMIN — METFORMIN HYDROCHLORIDE 1000 MG: 500 TABLET ORAL at 05:04

## 2020-04-06 RX ADMIN — SAXAGLIPTIN 5 MG: 2.5 TABLET, FILM COATED ORAL at 08:04

## 2020-04-06 RX ADMIN — ASPIRIN 81 MG: 81 TABLET, COATED ORAL at 08:04

## 2020-04-06 RX ADMIN — OLANZAPINE 20 MG: 10 TABLET, FILM COATED ORAL at 08:04

## 2020-04-06 NOTE — PLAN OF CARE
Problem: Adult Behavioral Health Plan of Care  Goal: Optimized Coping Skills in Response to Life Stressors  Intervention: Promote Effective Coping Strategies  Flowsheets (Taken 4/6/2020 5845)  Supportive Measures: active listening utilized; counseling provided; positive reinforcement provided; verbalization of feelings encouraged; problem solving facilitated; decision-making supported; relaxation techniques promoted; goal setting facilitated; self-care encouraged; self-reflection promoted; self-responsibility promoted; journaling promoted        Behavior: Patient attended psychotherapeutic group dressed in  personal clothing, fair hygiene with shallow affect and sedated mood, relaxed posture, and appropriate eye contact with trouble keeping his eyes open. Patient remained somewhat engaged and attentive.    Intervention:  will engage patients in a process group focused on recognizing strengths in self. Conversation will be prompted with a list of discussion questions related to discovering one's strengths via past experiences. This will give patients an opportunity to gain insight into their own abilities to overcome obstacles. We will also practice grounding, breathing techniques, and other mindfulness exercises. Patients will be given time to express thoughts, concerns and goals for treatment and discharge, as well as perceived barriers to progress.    Response: Patient remained attentive and engaged in conversation providing semi off-topic feedback to fellow group members. Patient responded when prompted and without prompting. Patient continued to make comments about sports and has minimal insight. He was falling asleep during the relaxation exercises but says they are relaxing. He says he was thinking about the future while deep breathing.  reiterated the goal of thinking only about the breathing itself.      Plan:  will continue to encourage patient to explore and  verbalize emotions, set goals to aid in preventing re-hospitalization, attend psychotherapeutic group, and follow up with aftercare appointments.

## 2020-04-06 NOTE — PLAN OF CARE
Pt is sleeping at this time and has slept 6 hours with two awakenings.  Confused when wakes up, is redirectable.  NAD.  Resp even & unlabored.  Pathways clear and bed in low position.  Q 15 minute safety checks ongoing.  All precautions maintained

## 2020-04-06 NOTE — PLAN OF CARE
Pt mood is improving, Denies any S/I or H/I.  Interacting well with others.  No acute distress apparent, will continue to monitor.

## 2020-04-06 NOTE — PLAN OF CARE
Pt pacing hallway, smiling at staff, interacts with short answers.  Confused at times.  Thought the date was the 20th.  Gave staff his pitcher and when asked if he needed water in it, he replied that he was just giving it to staff to .  Ate snacks, took meds as ordered, watched tv for short while but got up to pace.  No distress noted, no complaints voiced.  Denies SI/HI/hallucinations.  Rates depression and anxiety 0/10

## 2020-04-06 NOTE — PLAN OF CARE
"  Problem: Adult Behavioral Health Plan of Care  Goal: Rounds/Family Conference  Outcome: Ongoing, Progressing  Flowsheets (Taken 4/6/2020 0906)  Participants: psychiatrist; social work; other (see comments); therapeutic recreation; patient; nursing (social )  Summary: treatment team update     Chief Complaint:  Patient is presenting with elevated mood with psychotic features. He has been participating but has minimal insight. He has had minimal ability to maintain stability outside of institutions since December.    Current:  Patient presented to treatment team dressed in personal clothing with fair hygiene and cooperative mood. Pt appears to have poverty of thought with vague responses. He says he is "doing great." Psychiatrist discussed treatment plan and medications- he verbalized understanding. Pt still unable to give insight into why he was admitted. He maintains that he was falling down. He minimizes problematic behaviors "he thinks so." He endorses that he feels like he can do whatever he wants when he is manic. He says that [place] is his power of , and his mother picked him. He says that he feels close to him.     Plan:  Patient will be encouraged to engage in psychotherapeutic groups and recreational therapy. Patient's medication will be monitored and adjusted as needed. Patient will be encouraged to follow up with aftercare appointments.  "

## 2020-04-06 NOTE — PROGRESS NOTES
"PSYCHIATRY DAILY INPATIENT PROGRESS NOTE  SUBSEQUENT HOSPITAL VISIT    ENCOUNTER DATE: 4/6/2020  [facility]    DATE OF ADMISSION: 3/24/2020 12:41 AM  LENGTH OF STAY: 13 days    HISTORY    CHIEF COMPLAINT   Chau Lovett is a 59 y.o. male, seen during daily contreras rounds on the inpatient unit.  Chau Lovett presents with the chief complaint of psychosis/hallucinations, "I have been falling."    HPI   (Elements: Location, Quality, Severity, Duration, Timing, Content, Modifying Factors, Associated Signs & Symptoms)    The patient was seen and examined. The chart was reviewed.     Reviewed notes by  RNs and LMSW from the last 24 hours.    The patient's case was discussed with the treatment team and care providers today, including RNs, LMSW, CTRS and Specialty Services.    Staff reports no behavioral or management issues.     The patient has been compliant with treatment. The patient denied any side effects.    The patient continues to lack insight into his illness. He denies all psychiatric symptoms, despite ongoing symtpoms of psychosis/craig which remain fx/bx impairing (some improvement noted since admission). He is unable to provide a narrative as to why he was hospitalized.     His thought process is concrete but less distractible, which still limits treatment/therapy efficacy a this time.  He is pleasant and having no bx issues at this time aside form having poor boundaries. He is compliant with treatment.     Collateral from his family reports that the patient has not been able to sustain more than 1-2 weeks out of the hospital; he is not stable enough to attend routine outpatient care (nearest available appointment is on 4/14/20). He requires an IOP to sustain stability.     Symptoms are unchanged from yesterday as documented below. He was happy to have talked with his cousin. He remains pleasant but superficial.      Denied Symptoms of Depression: no diminished mood or loss of " interest/anhedonia; no irritability, diminished energy, change in sleep, change in appetite, diminished concentration or cognition or indecisiveness, PMA/R, excessive guilt or hopelessness or worthlessness, or suicidal ideations     Continued but lessening Changes in Sleep: less trouble with initiation/maintenance, no early morning awakening with inability to return to sleep; +diminshed need for sleep; pt slept 6.5 hours last night     Denied Suicidal/Homicidal ideations: no active/passive ideations, organized plans, or future intentions     Continued but lessening Symptoms of psychosis: less hallucinations, no delusions, no disorganized thinking, no disorganized behavior or abnormal motor behavior, no negative symptoms      Improving Symptoms of craig or hypomania: no elevated, no expansive, no irritable mood with less increased energy or activity; with no inflated self-esteem or grandiosity, less decreased need for sleep, no increased rate of speech, no FOI or racing thoughts, less distractibility, no increased goal directed activity or PMA, less risky/disinhibited behavior    Denied Symptoms of Anxiety: no excessive anxiety/worry/fear, no panic attacks; without agoraphobia    PSYCHOTHERAPY ADD-ON +[id number]   16-37 minutes    Time: 16 minutes  Participants: Met with patient    Therapeutic Intervention Type: supportive psychotherapy  Why chosen therapy is appropriate versus another modality: relevant to diagnosis, patient responds to this modality, evidence based practice    Target symptoms: mood disorder, psychosis  Primary focus: mood, psychosis, medication compliance  Psychotherapeutic techniques: supportive and psycho-educational techniques     Outcome monitoring methods: self-report, observation    Patient's response to intervention:  The patient's response to intervention is accepting.    Progress toward goals:  The patient's progress toward goals is fair .      ROS  General ROS: negative  Ophthalmic ROS:  negative  ENT ROS: negative  Allergy and Immunology ROS: negative  Hematological and Lymphatic ROS: negative  Endocrine ROS: negative  Respiratory ROS: no cough, shortness of breath, or wheezing  Cardiovascular ROS: no chest pain or dyspnea on exertion  Gastrointestinal ROS: no abdominal pain, change in bowel habits, or black or bloody stools  Genito-Urinary ROS: no dysuria, trouble voiding, or hematuria  Musculoskeletal ROS: negative  Neurological ROS: no TIA or stroke symptoms  Dermatological ROS: negative      PAST MEDICAL HISTORY   Past Medical History:   Diagnosis Date    Anxiety     Bipolar 1 disorder     Bradycardia     Depression     Diabetes mellitus     Hallucination     History of psychiatric hospitalization     [place] 2/2020 and [facility]    Hx of psychiatric care     Hypertension     Jacinta     Nuclear sclerosis of both eyes 10/22/2015    Psychiatric exam requested by authority     Psychiatric problem     Psychosis     Renal disorder     Schizoaffective disorder     Sleep difficulties     Therapy     Thyroid disease     possible           PSYCHOTROPIC MEDICATIONS   Scheduled Meds:   aspirin  81 mg Oral Daily    carBAMazepine  400 mg Oral BID    diphenhydrAMINE  100 mg Oral QHS    folic acid-vit B6-vit B12 2.5-25-2 mg  1 tablet Oral Daily    melatonin  6 mg Oral Nightly    metFORMIN  1,000 mg Oral BID WM    multivitamin  1 tablet Oral Daily    OLANZapine  20 mg Oral QHS    SAXagliptin  5 mg Oral Daily    simvastatin  20 mg Oral QHS     Continuous Infusions:  PRN Meds:.acetaminophen, aluminum-magnesium hydroxide-simethicone, docusate sodium, glucagon (human recombinant), glucose, glucose, glucose, hydrOXYzine pamoate, insulin aspart U-100, loperamide, OLANZapine **AND** OLANZapine        EXAMINATION    VITALS   Vitals:    04/06/20 0752   BP: (!) 146/69   Pulse: 102   Resp: 18   Temp: 98.6 °F (37 °C)     Body mass index is 28.14 kg/m².      CONSTITUTIONAL  General  "Appearance: WM, in hospital/casual garb, overweight; NAD     MUSCULOSKELETAL  Muscle Strength and Tone:  normal  Abnormal Involuntary Movements:  None- no hand tremor today  Gait and Station:  normal; mildly ataxic     PSYCHIATRIC   Level of Consciousness: awake, alert  Orientation: p/p/t/s  Grooming:  adequate to circumstances  Psychomotor Behavior: no PMA/R  Speech: nl r/t/v/s  Language:  English fluent  Mood: "ok"  Affect: improving range, less irritable, more pleasant/calmer  Thought Process: mostly/more linear and organized; less racing thoughts at times; less derailed at times  Associations:  intact; no CARLEY  Thought Content:  denied AVH/delusions; denied HI/SI  Memory:  intact to recent and remote events; 3/3 immediate; 2/3 at 3 minutes  Attention: mostly intact to conversation; less distractible   Fund of Knowledge: about age and education appropriate; 2/4 recent presidents; aware of current events  Estimate if Intelligence:  average based on work/education history, vocabulary and mental status exam  Insight:  fair- partially seeks help, understands/accepts illness  Judgment:   good- no bx issues, compliant and cooperative        DIAGNOSTIC TESTING   Laboratory Results  Recent Results (from the past 24 hour(s))   POCT glucose    Collection Time: 04/05/20 11:52 AM   Result Value Ref Range    POCT Glucose 156 (H) 70 - 110 mg/dL   POCT glucose    Collection Time: 04/05/20  4:37 PM   Result Value Ref Range    POCT Glucose 140 (H) 70 - 110 mg/dL   POCT glucose    Collection Time: 04/05/20  8:07 PM   Result Value Ref Range    POCT Glucose 136 (H) 70 - 110 mg/dL   POCT glucose    Collection Time: 04/06/20  7:45 AM   Result Value Ref Range    POCT Glucose 173 (H) 70 - 110 mg/dL         ASSESSMENT      IMPRESSION   Bipolar I Disorder MRE manic, severe with psychotic features  Insomnia secondary to a mental illness     EPS     Psychosocial stressors     Vitamin D insufficiency  B12 deficiency      Acute renal " insufficiency  Hyponatremia  Abnormal TSH  Mixed hyperlipidemia  Uncontrolled type 2 diabetes mellitus with hyperglycemia, without long-term current use of insulin  Essential hypertension     MEDICAL DECISION MAKING       PROBLEM LIST AND MANAGEMENT PLANS; PRESCRIPTION DRUG MANAGEMENT  Compliance: yes  Side Effects: no  Regimen Adjustments:      Bipolar I Disorder mre manic, severe with psychotic features: pt counseled  -Start re-trial of Tegretol CR at 100 mg po BID- increase to 200 mg po BID (last known stabilizing dosage)- increase to 300 mg po BID; increased to/conitnue at 400 mg po BID (checck level on Wednesday)  -Start re-trial of Zyprexa 5 mg po q HS- increase to 10 mg po q HS- increase to 15 mg po q HS- increase to 20 mg po q HS- increase to 25 mg po q HS- increase to/continue 30 mg po q HS- decrease back to 25 mg po q HS- decrease back to 20 mg po q HS (no benefit derived form higher dosage)  -we discussed his medication options in depth; the above medications were the only ones he was willing to try at this time     Insomnia: pt counseled  -start melatonin 6 mg po q hs  -Started/conitnue Benadryl at 50 mg pop q HS- increase to 75 mg po q HS- increase to/continue at 100 mg po q HS    EPS: pt counseled  -benadryl as above     Psychosocial stressors: Pt counseled  -SW consulted and assisted with resources     Vitamin D insufficiency: Pt counseled  -Started/continue Vitamin D3 50,000 units po q week x 8 weeks (1/8 given)     B12 deficiency:  Pt counseled  -Gave B12 1000 mcg IM x 1 on 3/24/20  -Start/continue Folbic po q day     Acute renal insufficiency: Pt counseled  -was likely from poor PO intake   -improved/resolved     Hyponatremia: pt counseled  -rechecked- resolved     Abnormal TSH:pt counseled  -rechecked and normalized     Mixed hyperlipidemia: pt counseled  Resume/continue simvastatin 20 mg po q day     Uncontrolled type 2 diabetes mellitus with hyperglycemia, without long-term current use of  insulin: pt counseled  -resumed/continue metformin 1000 mg po BID with meals and Saxagliptim 5 mg po q day     Essential hypertension: pt counseled  Hold lisinopril for now; monitor BP closely and resume if needed        DIAGNOSTIC TESTING  Labs reviewed with patient; follow up pending labs- check  CBC, CMP, and Tegretol level on Wednesday     Disposition:  -SW to assist with aftercare planning and collateral  -Once stable discharge home with outpatient follow up care and/or rehab  -Continue inpatient treatment under a PEC and/or CEC for grave disability as evident by fx/bx impairing mood and psychotic symptoms    NEED FOR CONTINUED HOSPITALIZATION  Psychiatric illness continues to pose a potential threat to life or bodily function, of self or others, thereby requiring the need for continued inpatient psychiatric hospitalization: Yes    Protective inpatient pyschiatric hospitalization required while a safe disposition plan is enacted: Yes    Patient stabilized and ready for discharge from inpatient psychiatric unit: No      STAFF:   Bao Naranjo MD  Psychiatry

## 2020-04-07 LAB
POCT GLUCOSE: 114 MG/DL (ref 70–110)
POCT GLUCOSE: 129 MG/DL (ref 70–110)
POCT GLUCOSE: 179 MG/DL (ref 70–110)
SARS-COV-2 RNA RESP QL NAA+PROBE: DETECTED

## 2020-04-07 PROCEDURE — 25000003 PHARM REV CODE 250: Performed by: PSYCHIATRY & NEUROLOGY

## 2020-04-07 PROCEDURE — 99233 SBSQ HOSP IP/OBS HIGH 50: CPT | Mod: ,,, | Performed by: PSYCHIATRY & NEUROLOGY

## 2020-04-07 PROCEDURE — 99233 PR SUBSEQUENT HOSPITAL CARE,LEVL III: ICD-10-PCS | Mod: ,,, | Performed by: PSYCHIATRY & NEUROLOGY

## 2020-04-07 PROCEDURE — 11400000 HC PSYCH PRIVATE ROOM

## 2020-04-07 RX ADMIN — THERA TABS 1 TABLET: TAB at 08:04

## 2020-04-07 RX ADMIN — CARBAMAZEPINE 400 MG: 400 TABLET, EXTENDED RELEASE ORAL at 08:04

## 2020-04-07 RX ADMIN — SIMVASTATIN 20 MG: 10 TABLET, FILM COATED ORAL at 08:04

## 2020-04-07 RX ADMIN — Medication 1 TABLET: at 08:04

## 2020-04-07 RX ADMIN — METFORMIN HYDROCHLORIDE 1000 MG: 500 TABLET ORAL at 04:04

## 2020-04-07 RX ADMIN — ASPIRIN 81 MG: 81 TABLET, COATED ORAL at 08:04

## 2020-04-07 RX ADMIN — METFORMIN HYDROCHLORIDE 1000 MG: 500 TABLET ORAL at 07:04

## 2020-04-07 RX ADMIN — DIPHENHYDRAMINE HYDROCHLORIDE 100 MG: 50 CAPSULE ORAL at 08:04

## 2020-04-07 RX ADMIN — Medication 6 MG: at 08:04

## 2020-04-07 RX ADMIN — SAXAGLIPTIN 5 MG: 2.5 TABLET, FILM COATED ORAL at 08:04

## 2020-04-07 RX ADMIN — OLANZAPINE 20 MG: 10 TABLET, FILM COATED ORAL at 08:04

## 2020-04-07 NOTE — PLAN OF CARE
Problem: Adult Behavioral Health Plan of Care  Goal: Develops/Participates in Therapeutic [place] to Support Successful Transition  Outcome: Ongoing, Progressing      called Lyssa (unsure of correct spelling) at [phone]. To discuss possible discharge plan due to positive COVID-19 results. Packet was sent to [phone] to set up an appointment. They will do teletherapy verbally on his phone. She will forward it to intake department and she will call back with the appointment. She says that he has medications delivered to his home via a local pharmacy.      asked patient and he says he does not get them delivered to his house and [facility] [place]

## 2020-04-07 NOTE — PLAN OF CARE
Lying quiet in bed, eyes closed, respiration even and unlabored, appearing asleep by 2330.  Slept well all shift till 0500 in which he was up to bathroom.  Safety and precautions maintained as ordered with rounds every 15 minutes, bed is fixed in a low position and pathways kept clear.  No fall occurred.

## 2020-04-07 NOTE — PSYCH
Pt remains on isolation.  Asymptomatic and slightly upset that he must stay in his room.  VS stable.  No acute distress apparent.  Will continue to monitor.

## 2020-04-07 NOTE — PROGRESS NOTES
"PSYCHIATRY DAILY INPATIENT PROGRESS NOTE  SUBSEQUENT HOSPITAL VISIT    ENCOUNTER DATE: 4/7/2020  SITE: [facility]    DATE OF ADMISSION: 3/24/2020 12:41 AM  LENGTH OF STAY: 14 days    HISTORY    CHIEF COMPLAINT   Chau Lovett is a 59 y.o. male, seen during daily contreras rounds on the inpatient unit.  Chau Lovett presents with the chief complaint of psychosis/hallucinations, "I have been falling."    HPI   (Elements: Location, Quality, Severity, Duration, Timing, Content, Modifying Factors, Associated Signs & Symptoms)    The patient was seen and examined. The chart was reviewed.     Reviewed notes by RD, CTRS, RNs and LMSW from the last 24 hours.    The patient's case was discussed with the treatment team and care providers today, including RNs, LMSW, CTRS and Specialty Services.    Staff reports no behavioral or management issues.     The patient has been compliant with treatment. The patient denied any side effects.    The patient continues to lack insight into his illness. He denies all psychiatric symptoms, despite ongoing symtpoms of psychosis/craig which remain fx/bx impairing (some improvement noted since admission). He is unable to provide a narrative as to why he was hospitalized.     His thought process is concrete but less distractible, which still limits treatment/therapy efficacy a this time.  He is pleasant and having no bx issues at this time aside form having poor boundaries. He is compliant with treatment.     Collateral from his family reports that the patient has not been able to sustain more than 1-2 weeks out of the hospital; he is not stable enough to attend routine outpatient care (nearest available appointment is on 4/14/20). He requires an IOP to sustain stability.     Symptoms are unchanged from yesterday as documented below. He was happy to have talked with his cousin. He remains pleasant but superficial.  He was somewhat irritable with not being able to watch a " particular show this evening. There is suspicion for underlying PDD.     Denied Symptoms of Depression: no diminished mood or loss of interest/anhedonia; no irritability, diminished energy, change in sleep, change in appetite, diminished concentration or cognition or indecisiveness, PMA/R, excessive guilt or hopelessness or worthlessness, or suicidal ideations     Continued but lessening Changes in Sleep: less trouble with initiation/maintenance, no early morning awakening with inability to return to sleep; +diminshed need for sleep; pt slept 6.5 hours last night     Denied Suicidal/Homicidal ideations: no active/passive ideations, organized plans, or future intentions     Lessening Symptoms of psychosis: less hallucinations, no delusions, no disorganized thinking, no disorganized behavior or abnormal motor behavior, no negative symptoms      Improving Symptoms of craig or hypomania: no elevated, no expansive, no irritable mood with less increased energy or activity; with no inflated self-esteem or grandiosity, less decreased need for sleep, no increased rate of speech, no FOI or racing thoughts, less distractibility, no increased goal directed activity or PMA, less risky/disinhibited behavior    Denied Symptoms of Anxiety: no excessive anxiety/worry/fear, no panic attacks; without agoraphobia      ROS  General ROS: negative  Ophthalmic ROS: negative  ENT ROS: negative  Allergy and Immunology ROS: negative  Hematological and Lymphatic ROS: negative  Endocrine ROS: negative  Respiratory ROS: no cough, shortness of breath, or wheezing  Cardiovascular ROS: no chest pain or dyspnea on exertion  Gastrointestinal ROS: no abdominal pain, change in bowel habits, or black or bloody stools  Genito-Urinary ROS: no dysuria, trouble voiding, or hematuria  Musculoskeletal ROS: negative  Neurological ROS: no TIA or stroke symptoms  Dermatological ROS: negative      PAST MEDICAL HISTORY   Past Medical History:   Diagnosis Date     "Anxiety     Bipolar 1 disorder     Bradycardia     Depression     Diabetes mellitus     Hallucination     History of psychiatric hospitalization     [place] 2/2020 and [facility]    Hx of psychiatric care     Hypertension     Jacinta     Nuclear sclerosis of both eyes 10/22/2015    Psychiatric exam requested by authority     Psychiatric problem     Psychosis     Renal disorder     Schizoaffective disorder     Sleep difficulties     Therapy     Thyroid disease     possible           PSYCHOTROPIC MEDICATIONS   Scheduled Meds:   aspirin  81 mg Oral Daily    carBAMazepine  400 mg Oral BID    diphenhydrAMINE  100 mg Oral QHS    folic acid-vit B6-vit B12 2.5-25-2 mg  1 tablet Oral Daily    melatonin  6 mg Oral Nightly    metFORMIN  1,000 mg Oral BID WM    multivitamin  1 tablet Oral Daily    OLANZapine  20 mg Oral QHS    SAXagliptin  5 mg Oral Daily    simvastatin  20 mg Oral QHS     Continuous Infusions:  PRN Meds:.acetaminophen, aluminum-magnesium hydroxide-simethicone, docusate sodium, glucagon (human recombinant), glucose, glucose, glucose, hydrOXYzine pamoate, insulin aspart U-100, loperamide, OLANZapine **AND** OLANZapine        EXAMINATION    VITALS   Vitals:    04/07/20 0809   BP: 133/71   Pulse: 94   Resp: 20   Temp: 97 °F (36.1 °C)     Body mass index is 28.14 kg/m².      CONSTITUTIONAL  General Appearance: WM, in hospital/casual garb, overweight; NAD     MUSCULOSKELETAL  Muscle Strength and Tone:  normal  Abnormal Involuntary Movements:  None- no hand tremor today  Gait and Station:  normal; mildly ataxic     PSYCHIATRIC   Level of Consciousness: awake, alert  Orientation: p/p/t/s  Grooming:  adequate to circumstances  Psychomotor Behavior: no PMA/R  Speech: nl r/t/v/s  Language:  English fluent  Mood: "alright"  Affect: improving range, less irritable, more pleasant/calmer  Thought Process: mostly/more linear and organized; less racing thoughts at times; less derailed at " times  Associations:  intact; no CARLEY  Thought Content:  denied AVH/delusions; denied HI/SI  Memory:  intact to recent and remote events; 3/3 immediate; 2/3 at 3 minutes  Attention: mostly intact to conversation; less distractible   Fund of Knowledge: about age and education appropriate; 2/4 recent presidents; aware of current events  Estimate if Intelligence:  average based on work/education history, vocabulary and mental status exam  Insight:  fair- partially seeks help, understands/accepts illness  Judgment:   good- no bx issues, compliant and cooperative        DIAGNOSTIC TESTING   Laboratory Results  Recent Results (from the past 24 hour(s))   POCT glucose    Collection Time: 04/06/20  5:13 PM   Result Value Ref Range    POCT Glucose 171 (H) 70 - 110 mg/dL   POCT glucose    Collection Time: 04/06/20  8:21 PM   Result Value Ref Range    POCT Glucose 189 (H) 70 - 110 mg/dL   POCT glucose    Collection Time: 04/07/20  7:45 AM   Result Value Ref Range    POCT Glucose 179 (H) 70 - 110 mg/dL         ASSESSMENT      IMPRESSION   Bipolar I Disorder MRE manic, severe with psychotic features  Insomnia secondary to a mental illness     EPS     Psychosocial stressors     Vitamin D insufficiency  B12 deficiency      Acute renal insufficiency  Hyponatremia  Abnormal TSH  Mixed hyperlipidemia  Uncontrolled type 2 diabetes mellitus with hyperglycemia, without long-term current use of insulin  Essential hypertension     MEDICAL DECISION MAKING       PROBLEM LIST AND MANAGEMENT PLANS; PRESCRIPTION DRUG MANAGEMENT  Compliance: yes  Side Effects: no  Regimen Adjustments:      Bipolar I Disorder mre manic, severe with psychotic features: pt counseled  -Start re-trial of Tegretol CR at 100 mg po BID- increase to 200 mg po BID (last known stabilizing dosage)- increase to 300 mg po BID; increased to/conitnue at 400 mg po BID (checck level on Wednesday)  -Start re-trial of Zyprexa 5 mg po q HS- increase to 10 mg po q HS- increase to 15  mg po q HS- increase to 20 mg po q HS- increase to 25 mg po q HS- increase to/continue 30 mg po q HS- decrease back to 25 mg po q HS- decrease back to 20 mg po q HS (no benefit derived form higher dosage)  -we discussed his medication options in depth; the above medications were the only ones he was willing to try at this time     Insomnia: pt counseled  -start melatonin 6 mg po q hs  -Started/conitnue Benadryl at 50 mg pop q HS- increase to 75 mg po q HS- increase to/continue at 100 mg po q HS    EPS: pt counseled  -benadryl as above     Psychosocial stressors: Pt counseled  -SW consulted and assisted with resources     Vitamin D insufficiency: Pt counseled  -Started/continue Vitamin D3 50,000 units po q week x 8 weeks (1/8 given)     B12 deficiency:  Pt counseled  -Gave B12 1000 mcg IM x 1 on 3/24/20  -Start/continue Folbic po q day     Acute renal insufficiency: Pt counseled  -was likely from poor PO intake   -improved/resolved     Hyponatremia: pt counseled  -rechecked- resolved     Abnormal TSH:pt counseled  -rechecked and normalized     Mixed hyperlipidemia: pt counseled  Resume/continue simvastatin 20 mg po q day     Uncontrolled type 2 diabetes mellitus with hyperglycemia, without long-term current use of insulin: pt counseled  -resumed/continue metformin 1000 mg po BID with meals and Saxagliptim 5 mg po q day     Essential hypertension: pt counseled  Hold lisinopril for now; monitor BP closely and resume if needed        DIAGNOSTIC TESTING  Labs reviewed with patient; follow up pending labs- check  CBC, CMP, and Tegretol level on Wednesday     Disposition:  -SW to assist with aftercare planning and collateral  -Once stable discharge home with outpatient follow up care and/or rehab  -Continue inpatient treatment under a PEC and/or CEC for grave disability as evident by fx/bx impairing mood and psychotic symptoms    NEED FOR CONTINUED HOSPITALIZATION  Psychiatric illness continues to pose a potential threat  to life or bodily function, of self or others, thereby requiring the need for continued inpatient psychiatric hospitalization: Yes    Protective inpatient pyschiatric hospitalization required while a safe disposition plan is enacted: Yes    Patient stabilized and ready for discharge from inpatient psychiatric unit: No      STAFF:   Bao Naranjo MD  Psychiatry

## 2020-04-07 NOTE — PLAN OF CARE
Problem: Adult Behavioral Health Plan of Care  Goal: Optimized Coping Skills in Response to Life Stressors  Intervention: Promote Effective Coping Strategies  Flowsheets (Taken 4/7/2020 2284)  Supportive Measures: active listening utilized; counseling provided; positive reinforcement provided; verbalization of feelings encouraged; problem solving facilitated; decision-making supported; relaxation techniques promoted; self-reflection promoted; self-care encouraged; self-responsibility promoted; journaling promoted; goal setting facilitated     As the patient's are being ruled out for COVID and isolated to their rooms,  did rounds at the patient's doors, individually to process their feelings regarding the current circumstances and the reasons that warranted their admission to [facility]. The patient did not look at ; he remained sitting up in bed and blocked by wall. He says that he is feeling a bit anxious.  asked how he has been dealing with it; he says he has been trying to do the breathing techniques and color with the supplies provided. We discussed his upcoming discharge. He feels ready to go home and more confident that he gets to be with [facility]. He says that he made friend's there. We discussed how he will have to utilize the coping skills and supports that we discussed as the PHP program is not running right now. He says he feels up to it. The patient mood is slightly anxious but compliant and polite.

## 2020-04-07 NOTE — PSYCH
Filled out release of information for [facility] at [address] [place] [address] in [place], [phone] and [facility] [phone].  Got a verbal release to send packet as patient had Covid 19 test pending.

## 2020-04-07 NOTE — PLAN OF CARE
"  Problem: Adult Behavioral Health Plan of Care  Goal: Optimized Coping Skills in Response to Life Stressors  Intervention: Promote Effective Coping Strategies  4/7/2020 1447 by Leatha Stafford LMSW  Flowsheets (Taken 4/7/2020 1447)  Supportive Measures: active listening utilized; counseling provided; positive reinforcement provided; verbalization of feelings encouraged; problem solving facilitated; decision-making supported; relaxation techniques promoted; goal setting facilitated; self-care encouraged; journaling promoted; self-reflection promoted; self-responsibility promoted        As the patient is being isolated after positive COVID-19 result,  checked in individually to process feelings regarding the current circumstances and the reasons that warranted their admission to [facility]. The patient is similar to prior discussion documented. He is anxious, brief responses. He says he is "great." He understands that he will have to quarantine in his home and follow the precautions given. He appears to be processing the news, but is not open to discussing further at this time. He says that he usually gets his medications at [facility], so that may need to be changed to aid in his staying compliant during quarantine period.        Per patient request,  reached out to Russell Amin at [phone] as a courtesy call to update him on the patient's test results.  informed him that discharge information may change as a result.  informed him that the patient is asymptomatic and will be asked to quarantine in his home for 7 days unless symptoms arise then he will add three more days. [facility] has been informed. They said that they have begun verbal over phone therapy anyway and will set him with an appointment. They will call when they set one. At this time, that is all  has as far as an update, and if that changes,  will call. He expressed his concern " "at the patients "mental capability" of self quarantining and that no one would be able to see if he deteriorated.   "

## 2020-04-07 NOTE — PROGRESS NOTES
"[facility]  Adult Nutrition  Progress Note    SUMMARY       Recommendations    Recommendation:   Continue Diabetic diet as ordered;    -encourage pt to increase intake of meals    Goals: increase intake of meals to 75% by f/u  Nutrition Goal Status: progressing towards goal  Communication of RD Recs: (POC)    Reason for Assessment    Reason For Assessment: RD follow-up  Diagnosis: psychological disorder  Relevant Medical History: Bipolar 1 disorder, T2DM, psych, HTN, craig    General Information Comments: Meal intake fluctuates from % and snacks intake at 100%. 6lb wt loss since admit.NFPE not performed, patient has been screened for possible COVID-19 and has been placed on airborne and contact precautions. Awaiting results from COVID-19 testing.    Nutrition Discharge Planning: Diabetic/low sodium     Nutrition Risk Screen    Nutrition Risk Screen: no indicators present    Nutrition/Diet History    Spiritual, Cultural Beliefs, Yarsani Practices, Values that Affect Care: no  Food Allergies: NKFA  Factors Affecting Nutritional Intake: None identified at this time    Anthropometrics    Temp: 97 °F (36.1 °C)  Height Method: Stated  Height: 5' 10" (177.8 cm)  Height (inches): 70 in  Weight Method: Standard Scale  Weight: 89 kg (196 lb 1.6 oz)  Weight (lb): 196.1 lb  Ideal Body Weight (IBW), Male: 166 lb  % Ideal Body Weight, Male (lb): 122.11 %  BMI (Calculated): 28.1  BMI Grade: 25 - 29.9 - overweight  Weight Loss Since Admission: 6 lb     Lab/Procedures/Meds    Pertinent Labs Reviewed: reviewed  Pertinent Labs Comments: no new labs to review  Pertinent Medications Reviewed: reviewed  Pertinent Medications Comments: Folic acid, metformin, multivitamin, statin    Estimated/Assessed Needs    Weight Used For Calorie Calculations: 89 kg (196 lb 3.4 oz)  Energy Calorie Requirements (kcal): 2225  Energy Need Method: [place](*1.3)  Protein Requirements: 71-89 g(.8-1 g/kg)  Weight Used For " Protein Calculations: 88.9 kg (196 lb)     Estimated Fluid Requirement Method: RDA Method  RDA Method (mL): 2225  CHO Requirement: 50% of calories    Nutrition Prescription Ordered    Current Diet Order: Diabetic    Evaluation of Received Nutrient/Fluid Intake    Fluid Required: meeting needs  % Intake of Estimated Energy Needs: 75 - 100 %  % Meal Intake: %    Nutrition Risk    Level of Risk/Frequency of Follow-up: (1x/wk)     Assessment and Plan  Nutrition Problem:  Inadequate energy intake     Related to (etiology):   psychosis     Signs and Symptoms (as evidenced by):   Meal intake is 50%     Interventions:  General Healthful Diet     Nutrition Diagnosis Status:   Improving    Monitor and Evaluation    Food and Nutrient Intake: energy intake, food and beverage intake  Food and Nutrient Adminstration: diet order  Anthropometric Measurements: weight, weight change, body mass index  Biochemical Data, Medical Tests and Procedures: electrolyte and renal panel, glucose/endocrine profile  Nutrition-Focused Physical Findings: overall appearance     Nutrition Follow-Up    RD Follow-up?: Yes

## 2020-04-07 NOTE — PLAN OF CARE
Pt isolating to room, avoiding social contact, denies SI at this time, appetite good, med compliant, safety precautions maintained, will continue to monitor

## 2020-04-07 NOTE — PROGRESS NOTES
"   04/07/20 1040   [facility] Group Therapy   Group Name Other  (1:1)   Specific Interventions Skilled Activity Creative Expression  (Expressions All About Self)   Participation Level Attentive;Sharing   Participation Quality Cooperative   Insight/Motivation Applies New Skills;Good   Affect/Mood Display Anxious;Appropriate   Cognition Alert   Psychomotor WNL   Patient was in isolation due to waiting on COVID test results. Staff approached patient door to offer individual activities to exercise cognitive skills and self expressions. Patient shared "I have a bad habit of getting agitated easily. I need to work on my attitude." Patient identified 3 ways to improve his attitude (listen, think before speaking, when I fall try again). Patient chose expressive art to work on in his room.  "

## 2020-04-07 NOTE — PLAN OF CARE
Pt tested for Covid-19 and had positive result.   Pt advised of his status by clinical/medical staff.  Pt remains under isolation protocol in his room with mask available for staff interactions.  All staff will continue to follow current PPE guidance for any and all +Covid-19 patients.  Pt has been asymptomatic per nursing since admission and is currently asymptomatic.   Dr. Naranjo notified of patient status and staff awaiting guidance on patient disposition from Dr. Naranjo.

## 2020-04-07 NOTE — PLAN OF CARE
Pt being cooperative.  Remains on isolation in room.  Denies any S/I or H/I.  Slightly irritated that he has to stay in his room but redirectable.  Pt scheduled to leave tomorrow pending lab results.  No acute distress apparent at this time, will continue to monitor.

## 2020-04-07 NOTE — PLAN OF CARE
Recommendation:   Continue Diabetic diet as ordered;    -encourage pt to increase intake of meals    Goals: increase intake of meals to 75% by f/u  Nutrition Goal Status: progressing towards goal  Nutrition Discharge Planning: Diabetic/low sodium

## 2020-04-07 NOTE — PLAN OF CARE
Problem: Adult Behavioral Health Plan of Care  Goal: Develops/Participates in Therapeutic [place] to Support Successful Transition  Intervention: Foster Therapeutic [place]  Flowsheets (Taken 4/7/2020 1037)  Trust Relationship/Rapport: care explained; choices provided; thoughts/feelings acknowledged; emotional support provided; empathic listening provided; questions answered; questions encouraged; reassurance provided      reached out to Russell Brennan at [phone] as a courtesy call to update him on the patient discharge information.  told Russell that [facility] made the decision to test the patient's for COVID-19. He is not showing any symptoms and has been appropriate on unit; however, the results will likely be back this afternoon. Nothing is set in stone, but this is the plan as of now. The psychiatrist has decided that he is appropriate to leave tomorrow. We will set up transportation, since he does not have anyone to pick him up. We will [facility] up with [facility]. He will leave with a packet with his medications and the discharge packet will be sent to his provider.

## 2020-04-08 VITALS
BODY MASS INDEX: 28.08 KG/M2 | HEART RATE: 97 BPM | TEMPERATURE: 97 F | DIASTOLIC BLOOD PRESSURE: 70 MMHG | RESPIRATION RATE: 18 BRPM | SYSTOLIC BLOOD PRESSURE: 139 MMHG | WEIGHT: 196.13 LBS | HEIGHT: 70 IN | OXYGEN SATURATION: 99 %

## 2020-04-08 PROBLEM — E87.1 HYPONATREMIA: Status: RESOLVED | Noted: 2020-03-24 | Resolved: 2020-04-08

## 2020-04-08 PROBLEM — N28.9 ACUTE RENAL INSUFFICIENCY: Status: RESOLVED | Noted: 2020-03-24 | Resolved: 2020-04-08

## 2020-04-08 LAB
ALBUMIN SERPL BCP-MCNC: 3.2 G/DL (ref 3.5–5.2)
ALP SERPL-CCNC: 88 U/L (ref 55–135)
ALT SERPL W/O P-5'-P-CCNC: 34 U/L (ref 10–44)
ANION GAP SERPL CALC-SCNC: 10 MMOL/L (ref 8–16)
AST SERPL-CCNC: 32 U/L (ref 10–40)
BASOPHILS # BLD AUTO: 0.02 K/UL (ref 0–0.2)
BASOPHILS NFR BLD: 0.4 % (ref 0–1.9)
BILIRUB SERPL-MCNC: 0.2 MG/DL (ref 0.1–1)
BUN SERPL-MCNC: 15 MG/DL (ref 6–20)
CALCIUM SERPL-MCNC: 9.1 MG/DL (ref 8.7–10.5)
CARBAMAZEPINE SERPL-MCNC: 10.3 UG/ML (ref 4–12)
CHLORIDE SERPL-SCNC: 103 MMOL/L (ref 95–110)
CO2 SERPL-SCNC: 27 MMOL/L (ref 23–29)
CREAT SERPL-MCNC: 0.9 MG/DL (ref 0.5–1.4)
DIFFERENTIAL METHOD: ABNORMAL
EOSINOPHIL # BLD AUTO: 0.1 K/UL (ref 0–0.5)
EOSINOPHIL NFR BLD: 1.4 % (ref 0–8)
ERYTHROCYTE [DISTWIDTH] IN BLOOD BY AUTOMATED COUNT: 12.8 % (ref 11.5–14.5)
EST. GFR  (AFRICAN AMERICAN): >60 ML/MIN/1.73 M^2
EST. GFR  (NON AFRICAN AMERICAN): >60 ML/MIN/1.73 M^2
GLUCOSE SERPL-MCNC: 151 MG/DL (ref 70–110)
HCT VFR BLD AUTO: 31.1 % (ref 40–54)
HGB BLD-MCNC: 10.3 G/DL (ref 14–18)
IMM GRANULOCYTES # BLD AUTO: 0.02 K/UL (ref 0–0.04)
IMM GRANULOCYTES NFR BLD AUTO: 0.4 % (ref 0–0.5)
LYMPHOCYTES # BLD AUTO: 0.7 K/UL (ref 1–4.8)
LYMPHOCYTES NFR BLD: 13.6 % (ref 18–48)
MCH RBC QN AUTO: 29.9 PG (ref 27–31)
MCHC RBC AUTO-ENTMCNC: 33.1 G/DL (ref 32–36)
MCV RBC AUTO: 90 FL (ref 82–98)
MONOCYTES # BLD AUTO: 0.5 K/UL (ref 0.3–1)
MONOCYTES NFR BLD: 9.9 % (ref 4–15)
NEUTROPHILS # BLD AUTO: 3.8 K/UL (ref 1.8–7.7)
NEUTROPHILS NFR BLD: 74.3 % (ref 38–73)
NRBC BLD-RTO: 0 /100 WBC
PLATELET # BLD AUTO: 280 K/UL (ref 150–350)
PMV BLD AUTO: 8.9 FL (ref 9.2–12.9)
POTASSIUM SERPL-SCNC: 4 MMOL/L (ref 3.5–5.1)
PROT SERPL-MCNC: 7 G/DL (ref 6–8.4)
RBC # BLD AUTO: 3.44 M/UL (ref 4.6–6.2)
SODIUM SERPL-SCNC: 140 MMOL/L (ref 136–145)
WBC # BLD AUTO: 5.13 K/UL (ref 3.9–12.7)

## 2020-04-08 PROCEDURE — 85025 COMPLETE CBC W/AUTO DIFF WBC: CPT

## 2020-04-08 PROCEDURE — 80156 ASSAY CARBAMAZEPINE TOTAL: CPT

## 2020-04-08 PROCEDURE — 80053 COMPREHEN METABOLIC PANEL: CPT

## 2020-04-08 PROCEDURE — 25000003 PHARM REV CODE 250: Performed by: PSYCHIATRY & NEUROLOGY

## 2020-04-08 PROCEDURE — 99239 HOSP IP/OBS DSCHRG MGMT >30: CPT | Mod: ,,, | Performed by: PSYCHIATRY & NEUROLOGY

## 2020-04-08 PROCEDURE — 99239 PR HOSPITAL DISCHARGE DAY,>30 MIN: ICD-10-PCS | Mod: ,,, | Performed by: PSYCHIATRY & NEUROLOGY

## 2020-04-08 PROCEDURE — 36415 COLL VENOUS BLD VENIPUNCTURE: CPT

## 2020-04-08 RX ORDER — OLANZAPINE 20 MG/1
20 TABLET ORAL NIGHTLY
Qty: 30 TABLET | Refills: 2 | Status: SHIPPED | OUTPATIENT
Start: 2020-04-08 | End: 2020-04-09

## 2020-04-08 RX ORDER — CARBAMAZEPINE 400 MG/1
400 TABLET, EXTENDED RELEASE ORAL 2 TIMES DAILY
Qty: 60 TABLET | Refills: 2 | Status: SHIPPED | OUTPATIENT
Start: 2020-04-08 | End: 2020-04-09

## 2020-04-08 RX ORDER — TALC
6 POWDER (GRAM) TOPICAL NIGHTLY
Qty: 30 TABLET | Refills: 2 | Status: SHIPPED | OUTPATIENT
Start: 2020-04-08 | End: 2020-08-14

## 2020-04-08 RX ORDER — DIPHENHYDRAMINE HCL 50 MG
100 CAPSULE ORAL NIGHTLY
Qty: 60 CAPSULE | Refills: 2 | Status: SHIPPED | OUTPATIENT
Start: 2020-04-08 | End: 2020-08-14

## 2020-04-08 RX ADMIN — SAXAGLIPTIN 5 MG: 2.5 TABLET, FILM COATED ORAL at 08:04

## 2020-04-08 RX ADMIN — CARBAMAZEPINE 400 MG: 400 TABLET, EXTENDED RELEASE ORAL at 08:04

## 2020-04-08 RX ADMIN — METFORMIN HYDROCHLORIDE 1000 MG: 500 TABLET ORAL at 08:04

## 2020-04-08 RX ADMIN — ASPIRIN 81 MG: 81 TABLET, COATED ORAL at 08:04

## 2020-04-08 RX ADMIN — Medication 1 TABLET: at 08:04

## 2020-04-08 RX ADMIN — THERA TABS 1 TABLET: TAB at 08:04

## 2020-04-08 NOTE — NURSING
Patient had very little sleep last night.  Slept 1.5 hours thus far. When asked if he would like a prn med to help him sleep, he refused. Tried to leave room 3 times during the night and had to be reminded that he is on isolation. Expressed frustration but complied with directive. Spent much of the night drawing on paper with markers. Removed all clothes at one point and had to be told to dress as he was on camera. Patient again complied.Safety precautions maintained. Rounds done every 15 minutes. Bed is fixed in low position and room is uncluttered and pathways are clear. Patient remains afebrile. Isolation precautions maintained.

## 2020-04-08 NOTE — PLAN OF CARE
Problem: Adult Behavioral Health Plan of Care  Goal: Develops/Participates in Therapeutic [place] to Support Successful Transition  Intervention: Foster Therapeutic [place]  Flowsheets (Taken 4/8/2020 7005)  Trust Relationship/Rapport: care explained; choices provided; thoughts/feelings acknowledged     Per patient request,  reached out to Russell Amin at [phone] as a courtesy call to update him on the patient's discharge disposition. He will be discharged today via hospital transportation. He will wear a mask when he leaves. He is being explained instructions, which is basically staying home for 7 days unless he becomes symptomatic, then he has to add 3 days to his isolation after last symptom. The plan remains [facility] teletherapy, the Medical team home health will see him, and he will need someone to bring him his medication and other necessities. He says that his aunt will do that. He did not know the PCP but says it is someone from [facility]. He will call other family members who may know and call back.     Lyssa of [facility] at [phone] was contacted to discuss PCP, pharmacy, and appointment date. The patient was asked who his PCP and pharmacy was but the information given was not applicable; he said [facility] and Yovani Jesus- unable to find a doctor by this name in the area.

## 2020-04-08 NOTE — PLAN OF CARE
Problem: Adult Behavioral Health Plan of Care  Goal: Develops/Participates in Therapeutic [place] to Support Successful Transition  Intervention: Foster Therapeutic [place]  4/8/2020 1126 by Leatha Stafford LMSW  Flowsheets (Taken 4/8/2020 1126)  Trust Relationship/Rapport: care explained      informed Russell Amin that the patient will be set up with [facility] pharmacy and follow up with his PCP Rinku Montez.

## 2020-04-08 NOTE — PLAN OF CARE
Problem: Adult Behavioral Health Plan of Care  Goal: Optimized Coping Skills in Response to Life Stressors  Intervention: Promote Effective Coping Strategies  4/7/2020 1447 by Leatha Stafford LMSW  Flowsheets (Taken 4/8/2020 1012)  Supportive Measures: active listening utilized; counseling provided; positive reinforcement provided; verbalization of feelings encouraged; problem solving facilitated; decision-making supported; relaxation techniques promoted; goal setting facilitated; self-care encouraged; journaling promoted; self-reflection promoted; self-responsibility promoted        As the patient is being isolated after positive COVID-19 result,  checked in individually to process feelings regarding the current circumstances and the reasons that warranted their admission to [facility]. The patient is agitated and restless. He says he is fine, which is incongruent with body language. The  who is monitoring the patient has seen him flushing the toilet every five minutes. He then screams that the toilet is over flowing. Tech was notified. He also repeatedly says that he does not understand the activities given to him by the , despite having them explained. Pt appears to be attempting to get someone to come in his room. He has hx of poor boundaries and cognitive delay. This is likely due to the isolation. He is discharging today.

## 2020-04-08 NOTE — PSYCH
[facility] be [facility] [address] [place] in [place], [phone], [facility] at [address] in [place], [phone], [facility] at [address] [place] in [place], [phone] and [facility] [address] [facility] in [place], [phone].  The medical Team will begin seeing patient at his home on 4/9/2020 in the afternoon.  Patient will call [facility] and [facility] when they re open from the Covid pandemic.  [facility] appointment [facility] on 4[facility]14/2020 at 10 am.  Patient does not use tobacco products and had a negative UDS on admit.  AVS faxed to all 4 facilities on 4/8/2020 at 2:10 am.

## 2020-04-08 NOTE — PLAN OF CARE
"  Problem: Adult Behavioral Health Plan of Care  Goal: Optimized Coping Skills in Response to Life Stressors  Intervention: Promote Effective Coping Strategies  Flowsheets (Taken 4/8/2020 1128)  Supportive Measures: active listening utilized; positive reinforcement provided; verbalization of feelings encouraged; counseling provided        As the patient is being isolated after positive COVID-19 result,  checked in individually to process feelings regarding the current circumstances and the reasons that [facility]. He says he is feeling "alright." He is anxious, agitated, and restless. He is focused on discharge.  gave positive reinforcement for his [facility].   "

## 2020-04-08 NOTE — PROGRESS NOTES
"   04/08/20 1120   [facility] Group Therapy   Group Name Leisure Education   Specific Interventions Coping Skills Training  (What I Want To Change Skilled Activity)   Participation Level Active;Attentive;Sharing   Participation Quality Cooperative;Social   Insight/Motivation Limited   Affect/Mood Display Appropriate   Cognition Alert   Psychomotor WNL   Patient reports "great" mood. Patient states "I want to learn how to get along with people and change my attitude." He states he needed to listen and try to understand before getting upset. Patient states hobbies he wants to develop are "tennis and basketball, but "It's been 20 years since I played." Patient encouraged to continue healthy leisure activities he currently participates in and new ones learned on the unit.  "

## 2020-04-08 NOTE — DISCHARGE SUMMARY
"Discharge Summary  Psychiatry    Admit Date: 3/24/2020    Discharge Date and Time:  04/08/2020 9:53 AM    Attending Physician: Bao Naranjo MD     Discharge Provider: Bao Naranjo MD    Reason for Admission:  psychosis/hallucinations, "I have been falling."    History of Present Illness:   The patient presented to the ER on 3/23/20 with complaints of psychosis/jhallucinations. Per the Er and staff notes:  -Chau Lovett presents to the emergency room with hallucinations  Patient has long history of bipolar disorder, last hospitalization was January  Patient is seeing things and hearing voices, family called 911 this afternoon  Patient initially was paranoid per EMS, has calmed down considerably since  Patient is nonviolent, not suicidal but obviously delusional and paranoid today  -Patient presents to the ER via EMS for psychiatric evaluation.  Patient is seen by [facility].  Patient has history of bipolar and schizophrenia.  Patient is manic at time of arrival, shouting out random words.  It is reported by EMS that patient is having visual hallucinations, pointing to people who are not there.  Patient unable to answer any questions himself due to manic state  -Pt ambulated onto unit and VSS.  Body assessment done witnessed by [facility]HT.  Noted large mole to mid upper back and light bruising to right mid torso.  Also multiple bruising to bilateral lower extremities in various stages of bruising and scarpes/skin tears to right lower leg.  Pt states the new bruising and scrapes are from falling on stairs at his house that is lifted 14ft.  But later also admits to falling at [facility] today. Pt required injection of haldol, ativan and benadryl in ER for being upset and not really wanting to cooperate.  Did finally comply with [facility]. Pt denies alcohol, drugs, and cigarette use.  Also states he is compliant with meds.  States he need to go to [facility] on Friday to get his weekly " insulin injection but was not able to give name of this med.  Pt denies hallucinations at this time but it was reported that he was talking to people not there in the ambulance.  Pt has a long hx of Bipolar and schizophrenia with multiple hospitalizations and pt states the last one was a couple months ago at [facility] after presenting to this ER. (did not find this in his records)  Pt state he is single and a virgin.  Admits to having a court date 5/19/20 for charges of sexual harassment. Pt states my  told me to plead not guilty because I am Bipolar.  Pt states he has never been violent but then admits to being put in restraints years ago in [facility].     The patient was medically cleared and admitted to the U.      The patient reports that he was diagnosed with Bipolar Disorder around the age of 19, when he had his first manic episode. He has numerous bouts of craig and depression since then. He does have bouts psychosis that only occur when his is in an active mood episode.      He reports that he was hospitalized at [facility] about 1 month ago for craig. He was hospitalized for about 2 weeks, stabilized and discharged to the [facility]. The patient was unable to name his medications. He reports that [facility] was closed secondary to the coronavirus.      He is currently denying any psychiatric symptoms, but his presentation and reports are consistent with craig with psychotic features     Denied Symptoms of Depression: no diminished mood or loss of interest/anhedonia; no irritability, diminished energy, change in sleep, change in appetite, diminished concentration or cognition or indecisiveness, PMA/R, excessive guilt or hopelessness or worthlessness, or suicidal ideations     +Changes in Sleep: +trouble with initiation/maintenance, no early morning awakening with inability to return to sleep; +diminshed need for sleep; pt slept 2 hours last night     Denied Suicidal/Homicidal ideations: no active/passive  ideations, organized plans, or future intentions     +Symptoms of psychosis: +hallucinations, no delusions, no disorganized thinking, no disorganized behavior or abnormal motor behavior, no negative symptoms      +Some Symptoms of craig or hypomania: no elevated, no expansive, or +irritable mood with +increased energy or activity; with no inflated self-esteem or grandiosity, +decreased need for sleep, no increased rate of speech, no FOI or racing thoughts, +distractibility, no increased goal directed activity or PMA, +risky/disinhibited behavior     Denied Symptoms of Anxiety: no excessive anxiety/worry/fear, no panic attacks; without agoraphobia     Denied Symptoms of PTSD: no h/o trauma; no re-experiencing/intrusive symptoms, avoidant behavior, negative alterations in cognition or mood, or hyperarousal symptoms;  without dissociative symptoms      Denied Symptoms of OCD: no obsessions or compulsions      Denied Symptoms of Eating Disorders: no anorexia, bulimia or binging     Denied Substance Use: no intoxication, withdrawal, tolerance, used in larger amounts or duration than intended, unsuccessful attempts to limit or quit, increased time engaging in or seeking out, cravings or strong desire to use, failure to fulfill obligations, negative consequences in social/interpersonal/occupational,/recreational areas, use in dangerous situations, or medical or psychological consequences     Procedures Performed: * No surgery found *    Hospital Course (synopsis of major diagnoses, care, treatment, and services provided during the course of the hospital stay):     The patient was stabilized and discharged on the following medications:    Bipolar I Disorder mre manic, severe with psychotic features: pt counseled  -Start re-trial of Tegretol CR at 100 mg po BID- increase to 200 mg po BID (last known stabilizing dosage)- increase to 300 mg po BID; increased to/conitnue at 400 mg po BID  -Start re-trial of Zyprexa 5 mg po q  HS- increase to 10 mg po q HS- increase to 15 mg po q HS- increase to 20 mg po q HS- increase to 25 mg po q HS- increase to/continue 30 mg po q HS- decrease back to 25 mg po q HS- decrease back to 20 mg po q HS (no benefit derived from higher dosage)  -we discussed his medication options in depth; the above medications were the only ones he was willing to try at this time     Insomnia: pt counseled  -start melatonin 6 mg po q hs  -Started/conitnue Benadryl at 50 mg pop q HS- increase to 75 mg po q HS- increase to/continue at 100 mg po q HS     EPS: pt counseled  -benadryl as above     Psychosocial stressors: Pt counseled  -SW consulted and assisted with resources     Vitamin D insufficiency: Pt counseled  -Started/continue Vitamin D3 50,000 units po q week x 8 weeks (1/8 given)     B12 deficiency:  Pt counseled  -Gave B12 1000 mcg IM x 1 on 3/24/20  -Start/continue Folbic po q day     Acute renal insufficiency: Pt counseled  -was likely from poor PO intake   -improved/resolved     Hyponatremia: pt counseled  -rechecked- resolved     Abnormal TSH:pt counseled  -rechecked and normalized     Mixed hyperlipidemia: pt counseled  Resume/continue simvastatin 20 mg po q day     Uncontrolled type 2 diabetes mellitus with hyperglycemia, without long-term current use of insulin: pt counseled  -resumed/continue metformin 1000 mg po BID with meals and Saxagliptim 5 mg po q day     Essential hypertension: pt counseled  Hold lisinopril for now; monitor BP closely and resume if needed     The patient was compliant with treatment. The patient denied any side effects.     The patient continues to lack insight into his illness, but it his baseline. He denies all psychiatric symptoms as below.    His thought process is concrete but less distractible, which still limits treatment/therapy efficacy a this time.  He is pleasant and having no bx issues at this time aside form having poor boundaries. He is compliant with treatment.       Symptoms are improved as documented below. He is now stable enough and able/willing to attend outpatient treatment.     Denied Symptoms of Depression: no diminished mood or loss of interest/anhedonia; no irritability, diminished energy, change in sleep, change in appetite, diminished concentration or cognition or indecisiveness, PMA/R, excessive guilt or hopelessness or worthlessness, or suicidal ideations     Continued but lessening Changes in Sleep: less trouble with initiation/maintenance, no early morning awakening with inability to return to sleep; less diminshed need for sleep     Denied Suicidal/Homicidal ideations: no active/passive ideations, organized plans, or future intentions     Improved Symptoms of psychosis: nohallucinations, no delusions, no disorganized thinking, no disorganized behavior or abnormal motor behavior, no negative symptoms      Improved Symptoms of craig or hypomania: no elevated, no expansive, no irritable mood with no increased energy or activity; with no inflated self-esteem or grandiosity, less decreased need for sleep, no increased rate of speech, no FOI or racing thoughts, less distractibility, no increased goal directed activity or PMA, no risky/disinhibited behavior     Denied Symptoms of Anxiety: no excessive anxiety/worry/fear, no panic attacks; without agoraphobia     There is concern for underlying PDD complicating his case.    He did test positive for COVID-19; he is asymptomatic as of this this time. He is stable enough to comply with recommended quarantine.     Discussed diagnosis, risks and benefits of proposed treatment vs alternative treatments vs no treatment, and potential side effects of these treatments.  The patient expresses understanding of the above and displays the capacity to agree with this treatment given said understanding.  Patient also agrees that, currently, the benefits outweigh the risks and would like to pursue treatment at this time.    MSE:  "  CONSTITUTIONAL  General Appearance: WM, in hospital/casual garb, overweight; NAD     MUSCULOSKELETAL  Muscle Strength and Tone:  normal  Abnormal Involuntary Movements:  None- no hand tremor today  Gait and Station:  normal; mildly ataxic     PSYCHIATRIC   Level of Consciousness: awake, alert  Orientation: p/p/t/s  Grooming:  adequate to circumstances  Psychomotor Behavior: no PMA/R  Speech: nl r/t/v/s  Language:  English fluent  Mood: "good"  Affect: improved range, not irritable, more pleasant/calmer; euthymic  Thought Process: linear and organized but concrete  Associations:  intact; no CARLEY  Thought Content:  denied AVH/delusions; denied HI/SI  Memory:  intact to recent and remote events; 3/3 immediate; 2/3 at 3 minutes  Attention: mostly intact to conversation; less distractible   Fund of Knowledge: about age and education appropriate; 2/4 recent presidents; aware of current events  Estimate if Intelligence:  average based on work/education history, vocabulary and mental status exam  Insight:  fair- partially seeks help, understands/accepts illness  Judgment:   good- no bx issues, compliant and cooperative         Tobacco Usage:  Is patient a smoker? No  Does patient want prescription for Tobacco Cessation? No  Does patient want counseling for Tobacco Cessation? No    If patient would like to quit, then over the counter nicotine patch could be used. The patient could also follow up with his PCP or psychiatric provider for other alternatives.     Final Diagnoses:    Principal Problem: Bipolar I Disorder MRE manic, severe with psychotic features   Secondary Diagnoses:   Insomnia secondary to a mental illness     EPS     Psychosocial stressors     Vitamin D insufficiency  B12 deficiency      Acute renal insufficiency  Hyponatremia  Abnormal TSH  Mixed hyperlipidemia  Uncontrolled type 2 diabetes mellitus with hyperglycemia, without long-term current use of insulin  Essential hypertension    Labs:  Admission on " 03/24/2020   Component Date Value Ref Range Status    Hemoglobin A1C 03/24/2020 11.9* 4.0 - 5.6 % Final    Estimated Avg Glucose 03/24/2020 295* 68 - 131 mg/dL Final    Sodium 03/24/2020 132* 136 - 145 mmol/L Final    Potassium 03/24/2020 4.6  3.5 - 5.1 mmol/L Final    Chloride 03/24/2020 98  95 - 110 mmol/L Final    CO2 03/24/2020 24  23 - 29 mmol/L Final    Glucose 03/24/2020 255* 70 - 110 mg/dL Final    BUN, Bld 03/24/2020 24* 6 - 20 mg/dL Final    Creatinine 03/24/2020 1.3  0.5 - 1.4 mg/dL Final    Calcium 03/24/2020 8.4* 8.7 - 10.5 mg/dL Final    Anion Gap 03/24/2020 10  8 - 16 mmol/L Final    eGFR if African American 03/24/2020 >60  >60 mL/min/1.73 m^2 Final    eGFR if non African American 03/24/2020 60  >60 mL/min/1.73 m^2 Final    POCT Glucose 03/24/2020 248* 70 - 110 mg/dL Final    POCT Glucose 03/24/2020 267* 70 - 110 mg/dL Final    POCT Glucose 03/24/2020 267* 70 - 110 mg/dL Final    POCT Glucose 03/24/2020 219* 70 - 110 mg/dL Final    POCT Glucose 03/25/2020 224* 70 - 110 mg/dL Final    POCT Glucose 03/25/2020 230* 70 - 110 mg/dL Final    POCT Glucose 03/25/2020 199* 70 - 110 mg/dL Final    POCT Glucose 03/25/2020 220* 70 - 110 mg/dL Final    POCT Glucose 03/26/2020 210* 70 - 110 mg/dL Final    POCT Glucose 03/26/2020 208* 70 - 110 mg/dL Final    POCT Glucose 03/26/2020 256* 70 - 110 mg/dL Final    POCT Glucose 03/26/2020 190* 70 - 110 mg/dL Final    POCT Glucose 03/27/2020 178* 70 - 110 mg/dL Final    POCT Glucose 03/27/2020 177* 70 - 110 mg/dL Final    POCT Glucose 03/27/2020 164* 70 - 110 mg/dL Final    POCT Glucose 03/27/2020 216* 70 - 110 mg/dL Final    Carbamazepine Lvl 03/28/2020 6.7  4.0 - 12.0 ug/mL Final    WBC 03/28/2020 5.04  3.90 - 12.70 K/uL Final    RBC 03/28/2020 4.05* 4.60 - 6.20 M/uL Final    Hemoglobin 03/28/2020 12.3* 14.0 - 18.0 g/dL Final    Hematocrit 03/28/2020 37.1* 40.0 - 54.0 % Final    Mean Corpuscular Volume 03/28/2020 92  82 - 98 fL Final     Mean Corpuscular Hemoglobin 03/28/2020 30.4  27.0 - 31.0 pg Final    Mean Corpuscular Hemoglobin Conc 03/28/2020 33.2  32.0 - 36.0 g/dL Final    RDW 03/28/2020 12.9  11.5 - 14.5 % Final    Platelets 03/28/2020 230  150 - 350 K/uL Final    MPV 03/28/2020 10.2  9.2 - 12.9 fL Final    Immature Granulocytes 03/28/2020 0.4  0.0 - 0.5 % Final    Gran # (ANC) 03/28/2020 3.4  1.8 - 7.7 K/uL Final    Immature Grans (Abs) 03/28/2020 0.02  0.00 - 0.04 K/uL Final    Lymph # 03/28/2020 1.0  1.0 - 4.8 K/uL Final    Mono # 03/28/2020 0.6  0.3 - 1.0 K/uL Final    Eos # 03/28/2020 0.0  0.0 - 0.5 K/uL Final    Baso # 03/28/2020 0.00  0.00 - 0.20 K/uL Final    nRBC 03/28/2020 0  0 /100 WBC Final    Gran% 03/28/2020 67.9  38.0 - 73.0 % Final    Lymph% 03/28/2020 19.4  18.0 - 48.0 % Final    Mono% 03/28/2020 11.9  4.0 - 15.0 % Final    Eosinophil% 03/28/2020 0.4  0.0 - 8.0 % Final    Basophil% 03/28/2020 0.0  0.0 - 1.9 % Final    Differential Method 03/28/2020 Automated   Final    Sodium 03/28/2020 136  136 - 145 mmol/L Final    Potassium 03/28/2020 4.2  3.5 - 5.1 mmol/L Final    Chloride 03/28/2020 101  95 - 110 mmol/L Final    CO2 03/28/2020 23  23 - 29 mmol/L Final    Glucose 03/28/2020 197* 70 - 110 mg/dL Final    BUN, Bld 03/28/2020 11  6 - 20 mg/dL Final    Creatinine 03/28/2020 0.9  0.5 - 1.4 mg/dL Final    Calcium 03/28/2020 8.7  8.7 - 10.5 mg/dL Final    Total Protein 03/28/2020 7.5  6.0 - 8.4 g/dL Final    Albumin 03/28/2020 3.6  3.5 - 5.2 g/dL Final    Total Bilirubin 03/28/2020 0.3  0.1 - 1.0 mg/dL Final    Alkaline Phosphatase 03/28/2020 106  55 - 135 U/L Final    AST 03/28/2020 26  10 - 40 U/L Final    ALT 03/28/2020 21  10 - 44 U/L Final    Anion Gap 03/28/2020 12  8 - 16 mmol/L Final    eGFR if African American 03/28/2020 >60  >60 mL/min/1.73 m^2 Final    eGFR if non African American 03/28/2020 >60  >60 mL/min/1.73 m^2 Final    POCT Glucose 03/28/2020 176* 70 - 110 mg/dL Final     POCT Glucose 03/28/2020 147* 70 - 110 mg/dL Final    POCT Glucose 03/28/2020 163* 70 - 110 mg/dL Final    POCT Glucose 03/28/2020 207* 70 - 110 mg/dL Final    POCT Glucose 03/29/2020 197* 70 - 110 mg/dL Final    POCT Glucose 03/29/2020 209* 70 - 110 mg/dL Final    POCT Glucose 03/29/2020 222* 70 - 110 mg/dL Final    POCT Glucose 03/29/2020 183* 70 - 110 mg/dL Final    POCT Glucose 03/30/2020 173* 70 - 110 mg/dL Final    POCT Glucose 03/30/2020 248* 70 - 110 mg/dL Final    POCT Glucose 03/30/2020 162* 70 - 110 mg/dL Final    POCT Glucose 03/31/2020 167* 70 - 110 mg/dL Final    POCT Glucose 03/31/2020 138* 70 - 110 mg/dL Final    POCT Glucose 03/31/2020 170* 70 - 110 mg/dL Final    POCT Glucose 03/31/2020 196* 70 - 110 mg/dL Final    POCT Glucose 04/01/2020 174* 70 - 110 mg/dL Final    POCT Glucose 04/01/2020 163* 70 - 110 mg/dL Final    POCT Glucose 04/01/2020 135* 70 - 110 mg/dL Final    POCT Glucose 04/01/2020 178* 70 - 110 mg/dL Final    Carbamazepine Lvl 04/02/2020 10.5  4.0 - 12.0 ug/mL Final    WBC 04/02/2020 4.54  3.90 - 12.70 K/uL Final    RBC 04/02/2020 3.69* 4.60 - 6.20 M/uL Final    Hemoglobin 04/02/2020 11.2* 14.0 - 18.0 g/dL Final    Hematocrit 04/02/2020 33.6* 40.0 - 54.0 % Final    Mean Corpuscular Volume 04/02/2020 91  82 - 98 fL Final    Mean Corpuscular Hemoglobin 04/02/2020 30.4  27.0 - 31.0 pg Final    Mean Corpuscular Hemoglobin Conc 04/02/2020 33.3  32.0 - 36.0 g/dL Final    RDW 04/02/2020 13.2  11.5 - 14.5 % Final    Platelets 04/02/2020 271  150 - 350 K/uL Final    MPV 04/02/2020 9.2  9.2 - 12.9 fL Final    Immature Granulocytes 04/02/2020 0.4  0.0 - 0.5 % Final    Gran # (ANC) 04/02/2020 3.3  1.8 - 7.7 K/uL Final    Immature Grans (Abs) 04/02/2020 0.02  0.00 - 0.04 K/uL Final    Lymph # 04/02/2020 0.6* 1.0 - 4.8 K/uL Final    Mono # 04/02/2020 0.6  0.3 - 1.0 K/uL Final    Eos # 04/02/2020 0.0  0.0 - 0.5 K/uL Final    Baso # 04/02/2020 0.01  0.00 - 0.20 K/uL  Final    nRBC 04/02/2020 0  0 /100 WBC Final    Gran% 04/02/2020 72.1  38.0 - 73.0 % Final    Lymph% 04/02/2020 14.1* 18.0 - 48.0 % Final    Mono% 04/02/2020 12.8  4.0 - 15.0 % Final    Eosinophil% 04/02/2020 0.4  0.0 - 8.0 % Final    Basophil% 04/02/2020 0.2  0.0 - 1.9 % Final    Differential Method 04/02/2020 Automated   Final    Sodium 04/02/2020 138  136 - 145 mmol/L Final    Potassium 04/02/2020 4.5  3.5 - 5.1 mmol/L Final    Chloride 04/02/2020 99  95 - 110 mmol/L Final    CO2 04/02/2020 29  23 - 29 mmol/L Final    Glucose 04/02/2020 141* 70 - 110 mg/dL Final    BUN, Bld 04/02/2020 13  6 - 20 mg/dL Final    Creatinine 04/02/2020 0.9  0.5 - 1.4 mg/dL Final    Calcium 04/02/2020 8.7  8.7 - 10.5 mg/dL Final    Total Protein 04/02/2020 7.0  6.0 - 8.4 g/dL Final    Albumin 04/02/2020 3.0* 3.5 - 5.2 g/dL Final    Total Bilirubin 04/02/2020 0.2  0.1 - 1.0 mg/dL Final    Alkaline Phosphatase 04/02/2020 83  55 - 135 U/L Final    AST 04/02/2020 24  10 - 40 U/L Final    ALT 04/02/2020 26  10 - 44 U/L Final    Anion Gap 04/02/2020 10  8 - 16 mmol/L Final    eGFR if African American 04/02/2020 >60  >60 mL/min/1.73 m^2 Final    eGFR if non African American 04/02/2020 >60  >60 mL/min/1.73 m^2 Final    TSH 04/02/2020 1.728  0.400 - 4.000 uIU/mL Final    POCT Glucose 04/02/2020 207* 70 - 110 mg/dL Final    POCT Glucose 04/02/2020 135* 70 - 110 mg/dL Final    POCT Glucose 04/02/2020 143* 70 - 110 mg/dL Final    POCT Glucose 04/03/2020 153* 70 - 110 mg/dL Final    POCT Glucose 04/03/2020 188* 70 - 110 mg/dL Final    POCT Glucose 04/03/2020 164* 70 - 110 mg/dL Final    POCT Glucose 04/04/2020 162* 70 - 110 mg/dL Final    POCT Glucose 04/04/2020 232* 70 - 110 mg/dL Final    POCT Glucose 04/04/2020 201* 70 - 110 mg/dL Final    POCT Glucose 04/04/2020 115* 70 - 110 mg/dL Final    POCT Glucose 04/05/2020 178* 70 - 110 mg/dL Final    POCT Glucose 04/05/2020 156* 70 - 110 mg/dL Final    POCT  Glucose 04/05/2020 140* 70 - 110 mg/dL Final    POCT Glucose 04/05/2020 136* 70 - 110 mg/dL Final    POCT Glucose 04/06/2020 173* 70 - 110 mg/dL Final    SARS-CoV2 (COVID-19) Qualitative P* 04/06/2020 Detected* Not Detected Final    POCT Glucose 04/06/2020 171* 70 - 110 mg/dL Final    POCT Glucose 04/06/2020 189* 70 - 110 mg/dL Final    POCT Glucose 04/07/2020 179* 70 - 110 mg/dL Final    POCT Glucose 04/07/2020 114* 70 - 110 mg/dL Final    POCT Glucose 04/07/2020 129* 70 - 110 mg/dL Final    WBC 04/08/2020 5.13  3.90 - 12.70 K/uL Final    RBC 04/08/2020 3.44* 4.60 - 6.20 M/uL Final    Hemoglobin 04/08/2020 10.3* 14.0 - 18.0 g/dL Final    Hematocrit 04/08/2020 31.1* 40.0 - 54.0 % Final    Mean Corpuscular Volume 04/08/2020 90  82 - 98 fL Final    Mean Corpuscular Hemoglobin 04/08/2020 29.9  27.0 - 31.0 pg Final    Mean Corpuscular Hemoglobin Conc 04/08/2020 33.1  32.0 - 36.0 g/dL Final    RDW 04/08/2020 12.8  11.5 - 14.5 % Final    Platelets 04/08/2020 280  150 - 350 K/uL Final    MPV 04/08/2020 8.9* 9.2 - 12.9 fL Final    Immature Granulocytes 04/08/2020 0.4  0.0 - 0.5 % Final    Gran # (ANC) 04/08/2020 3.8  1.8 - 7.7 K/uL Final    Immature Grans (Abs) 04/08/2020 0.02  0.00 - 0.04 K/uL Final    Lymph # 04/08/2020 0.7* 1.0 - 4.8 K/uL Final    Mono # 04/08/2020 0.5  0.3 - 1.0 K/uL Final    Eos # 04/08/2020 0.1  0.0 - 0.5 K/uL Final    Baso # 04/08/2020 0.02  0.00 - 0.20 K/uL Final    nRBC 04/08/2020 0  0 /100 WBC Final    Gran% 04/08/2020 74.3* 38.0 - 73.0 % Final    Lymph% 04/08/2020 13.6* 18.0 - 48.0 % Final    Mono% 04/08/2020 9.9  4.0 - 15.0 % Final    Eosinophil% 04/08/2020 1.4  0.0 - 8.0 % Final    Basophil% 04/08/2020 0.4  0.0 - 1.9 % Final    Differential Method 04/08/2020 Automated   Final    Sodium 04/08/2020 140  136 - 145 mmol/L Final    Potassium 04/08/2020 4.0  3.5 - 5.1 mmol/L Final    Chloride 04/08/2020 103  95 - 110 mmol/L Final    CO2 04/08/2020 27  23 - 29  mmol/L Final    Glucose 04/08/2020 151* 70 - 110 mg/dL Final    BUN, Bld 04/08/2020 15  6 - 20 mg/dL Final    Creatinine 04/08/2020 0.9  0.5 - 1.4 mg/dL Final    Calcium 04/08/2020 9.1  8.7 - 10.5 mg/dL Final    Total Protein 04/08/2020 7.0  6.0 - 8.4 g/dL Final    Albumin 04/08/2020 3.2* 3.5 - 5.2 g/dL Final    Total Bilirubin 04/08/2020 0.2  0.1 - 1.0 mg/dL Final    Alkaline Phosphatase 04/08/2020 88  55 - 135 U/L Final    AST 04/08/2020 32  10 - 40 U/L Final    ALT 04/08/2020 34  10 - 44 U/L Final    Anion Gap 04/08/2020 10  8 - 16 mmol/L Final    eGFR if African American 04/08/2020 >60  >60 mL/min/1.73 m^2 Final    eGFR if non African American 04/08/2020 >60  >60 mL/min/1.73 m^2 Final   Admission on 03/23/2020, Discharged on 03/24/2020   Component Date Value Ref Range Status    Sodium 03/23/2020 134* 136 - 145 mmol/L Final    Potassium 03/23/2020 4.2  3.5 - 5.1 mmol/L Final    Chloride 03/23/2020 99  95 - 110 mmol/L Final    CO2 03/23/2020 18* 23 - 29 mmol/L Final    Glucose 03/23/2020 429* 70 - 110 mg/dL Final    BUN, Bld 03/23/2020 22* 6 - 20 mg/dL Final    Creatinine 03/23/2020 1.7* 0.5 - 1.4 mg/dL Final    Calcium 03/23/2020 9.6  8.7 - 10.5 mg/dL Final    Total Protein 03/23/2020 8.2  6.0 - 8.4 g/dL Final    Albumin 03/23/2020 4.2  3.5 - 5.2 g/dL Final    Total Bilirubin 03/23/2020 0.5  0.1 - 1.0 mg/dL Final    Alkaline Phosphatase 03/23/2020 135  55 - 135 U/L Final    AST 03/23/2020 29  10 - 40 U/L Final    ALT 03/23/2020 32  10 - 44 U/L Final    Anion Gap 03/23/2020 17* 8 - 16 mmol/L Final    eGFR if African American 03/23/2020 50* >60 mL/min/1.73 m^2 Final    eGFR if non African American 03/23/2020 43* >60 mL/min/1.73 m^2 Final    WBC 03/23/2020 4.91  3.90 - 12.70 K/uL Final    RBC 03/23/2020 4.54* 4.60 - 6.20 M/uL Final    Hemoglobin 03/23/2020 14.0  14.0 - 18.0 g/dL Final    Hematocrit 03/23/2020 39.9* 40.0 - 54.0 % Final    Mean Corpuscular Volume 03/23/2020 88  82 -  98 fL Final    Mean Corpuscular Hemoglobin 03/23/2020 30.8  27.0 - 31.0 pg Final    Mean Corpuscular Hemoglobin Conc 03/23/2020 35.1  32.0 - 36.0 g/dL Final    RDW 03/23/2020 12.9  11.5 - 14.5 % Final    Platelets 03/23/2020 197  150 - 350 K/uL Final    MPV 03/23/2020 10.5  9.2 - 12.9 fL Final    Immature Granulocytes 03/23/2020 CANCELED  0.0 - 0.5 % Final    Immature Grans (Abs) 03/23/2020 CANCELED  0.00 - 0.04 K/uL Final    Lymph # 03/23/2020 CANCELED  1.0 - 4.8 K/uL Final    Mono # 03/23/2020 CANCELED  0.3 - 1.0 K/uL Final    Eos # 03/23/2020 CANCELED  0.0 - 0.5 K/uL Final    Baso # 03/23/2020 CANCELED  0.00 - 0.20 K/uL Final    nRBC 03/23/2020 0  0 /100 WBC Final    Gran% 03/23/2020 58.0  38.0 - 73.0 % Final    Lymph% 03/23/2020 22.0  18.0 - 48.0 % Final    Mono% 03/23/2020 10.0  4.0 - 15.0 % Final    Eosinophil% 03/23/2020 0.0  0.0 - 8.0 % Final    Basophil% 03/23/2020 0.0  0.0 - 1.9 % Final    Bands 03/23/2020 10.0  % Final    Platelet Estimate 03/23/2020 Appears normal   Final    Differential Method 03/23/2020 Manual   Final    Salicylate Lvl 03/23/2020 <5.0* 15.0 - 30.0 mg/dL Final    Acetaminophen (Tylenol), Serum 03/23/2020 <3.0* 10.0 - 20.0 ug/mL Final    Benzodiazepines 03/23/2020 Negative   Final    Methadone metabolites 03/23/2020 Negative   Final    Cocaine (Metab.) 03/23/2020 Negative   Final    Opiate Scrn, Ur 03/23/2020 Negative   Final    Barbiturate Screen, Ur 03/23/2020 Negative   Final    Amphetamine Screen, Ur 03/23/2020 Negative   Final    THC 03/23/2020 Negative   Final    Phencyclidine 03/23/2020 Negative   Final    Creatinine, Random Ur 03/23/2020 133.1  23.0 - 375.0 mg/dL Final    Toxicology Information 03/23/2020 SEE COMMENT   Final    TSH 03/23/2020 7.479* 0.400 - 4.000 uIU/mL Final    Alcohol, Medical, Serum 03/23/2020 <10  <10 mg/dL Final    Vit D, 25-Hydroxy 03/23/2020 13* 30 - 96 ng/mL Final    Folate 03/23/2020 14.3  4.0 - 24.0 ng/mL Final     T3, Free 03/23/2020 3.7  2.3 - 4.2 pg/mL Final    Vitamin B-12 03/23/2020 339  210 - 950 pg/mL Final    Free T4 03/23/2020 1.39  0.71 - 1.51 ng/dL Final    POCT Glucose 03/23/2020 398* 70 - 110 mg/dL Final    POCT Glucose 03/23/2020 399* 70 - 110 mg/dL Final    POCT Glucose 03/23/2020 329* 70 - 110 mg/dL Final    POCT Glucose 03/23/2020 273* 70 - 110 mg/dL Final    Sodium 03/23/2020 132* 136 - 145 mmol/L Final    Potassium 03/23/2020 3.8  3.5 - 5.1 mmol/L Final    Chloride 03/23/2020 99  95 - 110 mmol/L Final    CO2 03/23/2020 22* 23 - 29 mmol/L Final    Glucose 03/23/2020 262* 70 - 110 mg/dL Final    BUN, Bld 03/23/2020 26* 6 - 20 mg/dL Final    Creatinine 03/23/2020 1.4  0.5 - 1.4 mg/dL Final    Calcium 03/23/2020 8.0* 8.7 - 10.5 mg/dL Final    Total Protein 03/23/2020 6.7  6.0 - 8.4 g/dL Final    Albumin 03/23/2020 3.5  3.5 - 5.2 g/dL Final    Total Bilirubin 03/23/2020 0.1  0.1 - 1.0 mg/dL Final    Alkaline Phosphatase 03/23/2020 97  55 - 135 U/L Final    AST 03/23/2020 34  10 - 40 U/L Final    ALT 03/23/2020 28  10 - 44 U/L Final    Anion Gap 03/23/2020 11  8 - 16 mmol/L Final    eGFR if African American 03/23/2020 >60  >60 mL/min/1.73 m^2 Final    eGFR if non African American 03/23/2020 55* >60 mL/min/1.73 m^2 Final         Discharged Condition: stable and improved; not currently a danger to self/others or gravely disabled    Disposition: Home or Self Care    Is patient being discharged on multiple neuroleptics? No    Follow Up/Patient Instructions:     Medications:  Reconciled Home Medications:      Medication List      START taking these medications    carBAMazepine 400 MG Tb12  Commonly known as:  TEGRETOL XR  Take 1 tablet (400 mg total) by mouth 2 (two) times daily.     folic acid-vit B6-vit B12 2.5-25-2 mg 2.5-25-2 mg Tab  Commonly known as:  FOLBIC or Equiv  Take 1 tablet by mouth once daily.  Start taking on:  April 9, 2020     melatonin 3 mg tablet  Commonly known as:   MELATIN  Take 2 tablets (6 mg total) by mouth nightly.     OLANZapine 20 MG tablet  Commonly known as:  ZyPREXA  Take 1 tablet (20 mg total) by mouth every evening.        CHANGE how you take these medications    diphenhydrAMINE 50 MG capsule  Commonly known as:  BENADRYL  Take 2 capsules (100 mg total) by mouth every evening.  What changed:    · medication strength  · how much to take  · additional instructions        CONTINUE taking these medications    aspirin 81 MG EC tablet  Commonly known as:  ECOTRIN  Take 81 mg by mouth once daily.     BLOOD GLUCOSE TEST Strp  Generic drug:  blood sugar diagnostic  1 strip once daily.     fish oil-omega-3 fatty acids 300-1,000 mg capsule  Take 2 g by mouth once daily.     glimepiride 2 MG tablet  Commonly known as:  AMARYL  Take 1 tablet (2 mg total) by mouth 2 (two) times daily.     * lancets 28 gauge Misc  Commonly known as:  TRUEPLUS LANCETS  1 lancet by Misc.(Non-Drug; Combo Route) route 3 (three) times daily.     * TRUEPLUS LANCETS 28 gauge Misc  Generic drug:  lancets  USE AS DIRECTED     lisinopriL 5 MG tablet  Commonly known as:  PRINIVIL,ZESTRIL  TAKE 1 TABLET BY MOUTH EVERY DAY     metFORMIN 1000 MG tablet  Commonly known as:  GLUCOPHAGE  Take 1 tablet (1,000 mg total) by mouth 2 (two) times daily with meals.     SAXagliptin 5 mg Tab tablet  Commonly known as:  ONGLYZA  Take 1 tablet (5 mg total) by mouth once daily.     simvastatin 20 MG tablet  Commonly known as:  ZOCOR  Take 1 tablet (20 mg total) by mouth every evening.     VITAMIN D3 50 mcg (2,000 unit) Cap  Generic drug:  cholecalciferol (vitamin D3)  Take 1 capsule by mouth once daily.         * This list has 2 medication(s) that are the same as other medications prescribed for you. Read the directions carefully, and ask your doctor or other care provider to review them with you.            STOP taking these medications    DEPAKOTE  MG Tb24  Generic drug:  divalproex ER     multivitamin with minerals  tablet     risperiDONE 3 MG Tab  Commonly known as:  RISPERDAL          No discharge procedures on file.  Follow-up Information     [facility]. Go on 4/14/2020.    Specialty:  Psychology  Why:  Outpatient Psych Services, as scheduled AT 10am  Contact information:  [address] [place] [address]  [phone]                     Diet: diabetic diet    Activity as tolerated    Total time spent discharging patient: 32 minutes    Bao Naranjo MD  Psychiatry

## 2020-04-08 NOTE — PLAN OF CARE
Plan of care reviewed. Denies intent to harm self or others at this time. Accepts snacks and medications. Gait steady, no falls. Interacting with staff. Remains in room as instructed. Airborne, contact, and droplet isolation maintained. Verbalized understanding of reason he is on isolated. States having no shortness of breath or body aches. Remains asymptomatic. Promoted individualized safety plan, reality-based interactions, effective coping strategies, and impulse control. Will continue precautions and monitor for safety.

## 2020-04-08 NOTE — NURSING
Discharge note : patient is cooperative . avs reviewed with patient and he expresses understanding . He has all belongings . Medical transportation will bring him home . He denies suicidal , homicidal ideations . He is not gravely disabled .

## 2020-04-08 NOTE — PSYCH
Packet has been faxed to [facility], for placement with their home health program. Social  has spoken with the intake person who will call back.

## 2020-04-08 NOTE — PSYCH
Called [facility] to inquire about insurances accepted. The Social  has been informed that Medicare part D is needed to pay for medications. The patient has Medicare A&B. ..............Also,  Attempted to call to schedule an appointment with the patient's primary care doctor; however, was unable to speak with anyone to schedule an appointment with Rinku NATARAJAN

## 2020-04-09 RX ORDER — OLANZAPINE 20 MG/1
20 TABLET ORAL NIGHTLY
Qty: 30 TABLET | Refills: 2 | Status: SHIPPED | OUTPATIENT
Start: 2020-04-09 | End: 2021-05-03

## 2020-04-09 RX ORDER — CARBAMAZEPINE 200 MG/1
400 TABLET ORAL 2 TIMES DAILY
Qty: 120 TABLET | Refills: 2 | Status: SHIPPED | OUTPATIENT
Start: 2020-04-09 | End: 2020-04-09

## 2020-04-09 RX ORDER — OLANZAPINE 20 MG/1
20 TABLET ORAL NIGHTLY
Qty: 30 TABLET | Refills: 2 | Status: SHIPPED | OUTPATIENT
Start: 2020-04-09 | End: 2020-04-09

## 2020-04-09 RX ORDER — CARBAMAZEPINE 200 MG/1
400 TABLET ORAL 2 TIMES DAILY
Qty: 120 TABLET | Refills: 2 | Status: SHIPPED | OUTPATIENT
Start: 2020-04-09 | End: 2021-05-03

## 2020-04-13 DIAGNOSIS — U07.1 COVID-19 VIRUS DETECTED: ICD-10-CM

## 2021-03-15 ENCOUNTER — HOSPITAL ENCOUNTER (EMERGENCY)
Facility: HOSPITAL | Age: 61
Discharge: PSYCHIATRIC OR SUBSTANCE USE FACILITY | End: 2021-03-16
Attending: SURGERY
Payer: MEDICARE

## 2021-03-15 VITALS
TEMPERATURE: 98 F | OXYGEN SATURATION: 98 % | BODY MASS INDEX: 33.53 KG/M2 | RESPIRATION RATE: 18 BRPM | DIASTOLIC BLOOD PRESSURE: 70 MMHG | WEIGHT: 233.69 LBS | HEART RATE: 85 BPM | SYSTOLIC BLOOD PRESSURE: 135 MMHG

## 2021-03-15 DIAGNOSIS — F31.9 BIPOLAR AFFECTIVE DISORDER, REMISSION STATUS UNSPECIFIED: ICD-10-CM

## 2021-03-15 DIAGNOSIS — Z00.8 MEDICAL CLEARANCE FOR PSYCHIATRIC ADMISSION: Primary | ICD-10-CM

## 2021-03-15 LAB
ALBUMIN SERPL BCP-MCNC: 3.7 G/DL (ref 3.5–5.2)
ALP SERPL-CCNC: 146 U/L (ref 55–135)
ALT SERPL W/O P-5'-P-CCNC: 31 U/L (ref 10–44)
AMPHET+METHAMPHET UR QL: NEGATIVE
ANION GAP SERPL CALC-SCNC: 8 MMOL/L (ref 8–16)
APAP SERPL-MCNC: <3 UG/ML (ref 10–20)
AST SERPL-CCNC: 22 U/L (ref 10–40)
BARBITURATES UR QL SCN>200 NG/ML: NEGATIVE
BASOPHILS # BLD AUTO: 0.03 K/UL (ref 0–0.2)
BASOPHILS NFR BLD: 0.6 % (ref 0–1.9)
BENZODIAZ UR QL SCN>200 NG/ML: NEGATIVE
BILIRUB SERPL-MCNC: 0.3 MG/DL (ref 0.1–1)
BILIRUB UR QL STRIP: NEGATIVE
BUN SERPL-MCNC: 21 MG/DL (ref 6–20)
BZE UR QL SCN: NEGATIVE
CALCIUM SERPL-MCNC: 8.7 MG/DL (ref 8.7–10.5)
CANNABINOIDS UR QL SCN: NEGATIVE
CHLORIDE SERPL-SCNC: 100 MMOL/L (ref 95–110)
CLARITY UR: CLEAR
CO2 SERPL-SCNC: 25 MMOL/L (ref 23–29)
COLOR UR: YELLOW
CREAT SERPL-MCNC: 1.3 MG/DL (ref 0.5–1.4)
CREAT UR-MCNC: 24.3 MG/DL (ref 23–375)
DIFFERENTIAL METHOD: ABNORMAL
EOSINOPHIL # BLD AUTO: 0.1 K/UL (ref 0–0.5)
EOSINOPHIL NFR BLD: 2.3 % (ref 0–8)
ERYTHROCYTE [DISTWIDTH] IN BLOOD BY AUTOMATED COUNT: 12 % (ref 11.5–14.5)
EST. GFR  (AFRICAN AMERICAN): >60 ML/MIN/1.73 M^2
EST. GFR  (NON AFRICAN AMERICAN): 59 ML/MIN/1.73 M^2
ETHANOL SERPL-MCNC: <10 MG/DL
GLUCOSE SERPL-MCNC: 325 MG/DL (ref 70–110)
GLUCOSE UR QL STRIP: ABNORMAL
HCT VFR BLD AUTO: 35.4 % (ref 40–54)
HGB BLD-MCNC: 11.6 G/DL (ref 14–18)
HGB UR QL STRIP: NEGATIVE
IMM GRANULOCYTES # BLD AUTO: 0.04 K/UL (ref 0–0.04)
IMM GRANULOCYTES NFR BLD AUTO: 0.8 % (ref 0–0.5)
KETONES UR QL STRIP: NEGATIVE
LEUKOCYTE ESTERASE UR QL STRIP: NEGATIVE
LYMPHOCYTES # BLD AUTO: 0.9 K/UL (ref 1–4.8)
LYMPHOCYTES NFR BLD: 19.3 % (ref 18–48)
MCH RBC QN AUTO: 30.9 PG (ref 27–31)
MCHC RBC AUTO-ENTMCNC: 32.8 G/DL (ref 32–36)
MCV RBC AUTO: 94 FL (ref 82–98)
METHADONE UR QL SCN>300 NG/ML: NEGATIVE
MONOCYTES # BLD AUTO: 0.7 K/UL (ref 0.3–1)
MONOCYTES NFR BLD: 13.7 % (ref 4–15)
NEUTROPHILS # BLD AUTO: 3.1 K/UL (ref 1.8–7.7)
NEUTROPHILS NFR BLD: 63.3 % (ref 38–73)
NITRITE UR QL STRIP: NEGATIVE
NRBC BLD-RTO: 0 /100 WBC
OPIATES UR QL SCN: NEGATIVE
PCP UR QL SCN>25 NG/ML: NEGATIVE
PH UR STRIP: 6 [PH] (ref 5–8)
PLATELET # BLD AUTO: 182 K/UL (ref 150–350)
PMV BLD AUTO: 10.4 FL (ref 9.2–12.9)
POTASSIUM SERPL-SCNC: 4.2 MMOL/L (ref 3.5–5.1)
PROT SERPL-MCNC: 7 G/DL (ref 6–8.4)
PROT UR QL STRIP: NEGATIVE
RBC # BLD AUTO: 3.76 M/UL (ref 4.6–6.2)
SALICYLATES SERPL-MCNC: <5 MG/DL (ref 15–30)
SARS-COV-2 RDRP RESP QL NAA+PROBE: NEGATIVE
SODIUM SERPL-SCNC: 133 MMOL/L (ref 136–145)
SP GR UR STRIP: 1.01 (ref 1–1.03)
T4 FREE SERPL-MCNC: 1 NG/DL (ref 0.71–1.51)
TOXICOLOGY INFORMATION: NORMAL
TSH SERPL DL<=0.005 MIU/L-ACNC: 2.75 UIU/ML (ref 0.4–4)
URN SPEC COLLECT METH UR: ABNORMAL
UROBILINOGEN UR STRIP-ACNC: NEGATIVE EU/DL
WBC # BLD AUTO: 4.83 K/UL (ref 3.9–12.7)

## 2021-03-15 PROCEDURE — 81003 URINALYSIS AUTO W/O SCOPE: CPT | Mod: 59 | Performed by: SURGERY

## 2021-03-15 PROCEDURE — 80143 DRUG ASSAY ACETAMINOPHEN: CPT | Performed by: SURGERY

## 2021-03-15 PROCEDURE — 82077 ASSAY SPEC XCP UR&BREATH IA: CPT | Performed by: SURGERY

## 2021-03-15 PROCEDURE — 85025 COMPLETE CBC W/AUTO DIFF WBC: CPT | Performed by: SURGERY

## 2021-03-15 PROCEDURE — 80179 DRUG ASSAY SALICYLATE: CPT | Performed by: SURGERY

## 2021-03-15 PROCEDURE — U0002 COVID-19 LAB TEST NON-CDC: HCPCS | Performed by: SURGERY

## 2021-03-15 PROCEDURE — 82607 VITAMIN B-12: CPT | Performed by: SURGERY

## 2021-03-15 PROCEDURE — 80053 COMPREHEN METABOLIC PANEL: CPT | Performed by: SURGERY

## 2021-03-15 PROCEDURE — 84439 ASSAY OF FREE THYROXINE: CPT | Performed by: SURGERY

## 2021-03-15 PROCEDURE — 82746 ASSAY OF FOLIC ACID SERUM: CPT | Performed by: SURGERY

## 2021-03-15 PROCEDURE — 36415 COLL VENOUS BLD VENIPUNCTURE: CPT | Performed by: SURGERY

## 2021-03-15 PROCEDURE — 80307 DRUG TEST PRSMV CHEM ANLYZR: CPT | Performed by: SURGERY

## 2021-03-15 PROCEDURE — 82962 GLUCOSE BLOOD TEST: CPT

## 2021-03-15 PROCEDURE — 99285 EMERGENCY DEPT VISIT HI MDM: CPT | Mod: 25

## 2021-03-15 PROCEDURE — 82306 VITAMIN D 25 HYDROXY: CPT | Performed by: SURGERY

## 2021-03-15 PROCEDURE — 84481 FREE ASSAY (FT-3): CPT | Performed by: SURGERY

## 2021-03-15 PROCEDURE — 84443 ASSAY THYROID STIM HORMONE: CPT | Performed by: SURGERY

## 2021-03-15 RX ORDER — ATORVASTATIN CALCIUM 20 MG/1
20 TABLET, FILM COATED ORAL NIGHTLY
COMMUNITY

## 2021-03-15 RX ORDER — LORAZEPAM 2 MG/ML
2 INJECTION INTRAMUSCULAR EVERY 4 HOURS PRN
Status: DISCONTINUED | OUTPATIENT
Start: 2021-03-15 | End: 2021-03-16 | Stop reason: HOSPADM

## 2021-03-15 RX ORDER — HALOPERIDOL 5 MG/ML
5 INJECTION INTRAMUSCULAR EVERY 4 HOURS PRN
Status: DISCONTINUED | OUTPATIENT
Start: 2021-03-15 | End: 2021-03-16 | Stop reason: HOSPADM

## 2021-03-15 RX ORDER — DIPHENHYDRAMINE HYDROCHLORIDE 50 MG/ML
50 INJECTION INTRAMUSCULAR; INTRAVENOUS EVERY 4 HOURS PRN
Status: DISCONTINUED | OUTPATIENT
Start: 2021-03-15 | End: 2021-03-16 | Stop reason: HOSPADM

## 2021-03-15 RX ORDER — OXCARBAZEPINE 300 MG/1
150 TABLET, FILM COATED ORAL 2 TIMES DAILY
COMMUNITY
End: 2021-05-03 | Stop reason: DRUGHIGH

## 2021-03-16 LAB
25(OH)D3+25(OH)D2 SERPL-MCNC: 18 NG/ML (ref 30–96)
FOLATE SERPL-MCNC: 8.7 NG/ML (ref 4–24)
POCT GLUCOSE: 227 MG/DL (ref 70–110)
T3FREE SERPL-MCNC: 2.1 PG/ML (ref 2.3–4.2)
VIT B12 SERPL-MCNC: 342 PG/ML (ref 210–950)

## 2021-06-16 ENCOUNTER — HOSPITAL ENCOUNTER (INPATIENT)
Facility: HOSPITAL | Age: 61
LOS: 12 days | Discharge: NURSING, REHAB OR LONG-TERM CARE | DRG: 871 | End: 2021-06-28
Attending: INTERNAL MEDICINE | Admitting: INTERNAL MEDICINE
Payer: MEDICARE

## 2021-06-16 DIAGNOSIS — K92.1 MELENA: ICD-10-CM

## 2021-06-16 DIAGNOSIS — K22.10 EROSIVE ESOPHAGITIS: ICD-10-CM

## 2021-06-16 DIAGNOSIS — G93.41 ACUTE METABOLIC ENCEPHALOPATHY: ICD-10-CM

## 2021-06-16 DIAGNOSIS — E87.20 METABOLIC ACIDOSIS: ICD-10-CM

## 2021-06-16 DIAGNOSIS — N17.9 AKI (ACUTE KIDNEY INJURY): ICD-10-CM

## 2021-06-16 DIAGNOSIS — A41.9 SEPSIS: ICD-10-CM

## 2021-06-16 DIAGNOSIS — A41.9 SEPSIS DUE TO UNDETERMINED ORGANISM: ICD-10-CM

## 2021-06-16 DIAGNOSIS — K26.9 DUODENAL ULCER: ICD-10-CM

## 2021-06-16 PROBLEM — E87.29 HIGH ANION GAP METABOLIC ACIDOSIS: Status: ACTIVE | Noted: 2021-06-16

## 2021-06-16 PROBLEM — E11.10 DIABETIC KETOACIDOSIS WITHOUT COMA ASSOCIATED WITH TYPE 2 DIABETES MELLITUS: Status: ACTIVE | Noted: 2021-06-16

## 2021-06-16 LAB
ABO + RH BLD: NORMAL
ALBUMIN SERPL BCP-MCNC: 2.6 G/DL (ref 3.5–5.2)
ALBUMIN SERPL BCP-MCNC: 2.6 G/DL (ref 3.5–5.2)
ALBUMIN SERPL BCP-MCNC: 2.7 G/DL (ref 3.5–5.2)
ALBUMIN SERPL BCP-MCNC: 2.7 G/DL (ref 3.5–5.2)
ALBUMIN SERPL BCP-MCNC: 2.9 G/DL (ref 3.5–5.2)
ALLENS TEST: ABNORMAL
ALLENS TEST: ABNORMAL
ALP SERPL-CCNC: 69 U/L (ref 55–135)
ALT SERPL W/O P-5'-P-CCNC: 5 U/L (ref 10–44)
AMMONIA PLAS-SCNC: 30 UMOL/L (ref 10–50)
ANION GAP SERPL CALC-SCNC: 12 MMOL/L (ref 8–16)
ANION GAP SERPL CALC-SCNC: 14 MMOL/L (ref 8–16)
ANION GAP SERPL CALC-SCNC: 14 MMOL/L (ref 8–16)
ANION GAP SERPL CALC-SCNC: 15 MMOL/L (ref 8–16)
ANION GAP SERPL CALC-SCNC: 16 MMOL/L (ref 8–16)
AST SERPL-CCNC: 12 U/L (ref 10–40)
B-OH-BUTYR BLD STRIP-SCNC: 0.2 MMOL/L (ref 0–0.5)
BACTERIA #/AREA URNS AUTO: ABNORMAL /HPF
BASOPHILS # BLD AUTO: 0.01 K/UL (ref 0–0.2)
BASOPHILS NFR BLD: 0.1 % (ref 0–1.9)
BILIRUB SERPL-MCNC: 0.3 MG/DL (ref 0.1–1)
BILIRUB UR QL STRIP: NEGATIVE
BLD GP AB SCN CELLS X3 SERPL QL: NORMAL
BUN SERPL-MCNC: 100 MG/DL (ref 8–23)
BUN SERPL-MCNC: 105 MG/DL (ref 8–23)
BUN SERPL-MCNC: 105 MG/DL (ref 8–23)
BUN SERPL-MCNC: 107 MG/DL (ref 8–23)
BUN SERPL-MCNC: 99 MG/DL (ref 8–23)
CALCIUM SERPL-MCNC: 8.1 MG/DL (ref 8.7–10.5)
CALCIUM SERPL-MCNC: 8.3 MG/DL (ref 8.7–10.5)
CALCIUM SERPL-MCNC: 8.3 MG/DL (ref 8.7–10.5)
CALCIUM SERPL-MCNC: 8.4 MG/DL (ref 8.7–10.5)
CALCIUM SERPL-MCNC: 8.4 MG/DL (ref 8.7–10.5)
CHLORIDE SERPL-SCNC: 102 MMOL/L (ref 95–110)
CHLORIDE SERPL-SCNC: 104 MMOL/L (ref 95–110)
CHLORIDE SERPL-SCNC: 104 MMOL/L (ref 95–110)
CHLORIDE SERPL-SCNC: 105 MMOL/L (ref 95–110)
CHLORIDE SERPL-SCNC: 105 MMOL/L (ref 95–110)
CHLORIDE UR-SCNC: 66 MMOL/L (ref 25–200)
CK SERPL-CCNC: 153 U/L (ref 20–200)
CLARITY UR REFRACT.AUTO: ABNORMAL
CO2 SERPL-SCNC: 16 MMOL/L (ref 23–29)
CO2 SERPL-SCNC: 16 MMOL/L (ref 23–29)
CO2 SERPL-SCNC: 17 MMOL/L (ref 23–29)
CO2 SERPL-SCNC: 17 MMOL/L (ref 23–29)
CO2 SERPL-SCNC: 18 MMOL/L (ref 23–29)
COLOR UR AUTO: YELLOW
CREAT SERPL-MCNC: 10.3 MG/DL (ref 0.5–1.4)
CREAT SERPL-MCNC: 10.8 MG/DL (ref 0.5–1.4)
CREAT SERPL-MCNC: 11.1 MG/DL (ref 0.5–1.4)
CREAT SERPL-MCNC: 11.3 MG/DL (ref 0.5–1.4)
CREAT SERPL-MCNC: 12.2 MG/DL (ref 0.5–1.4)
CREAT UR-MCNC: 80 MG/DL (ref 23–375)
DELSYS: ABNORMAL
DIFFERENTIAL METHOD: ABNORMAL
EOSINOPHIL # BLD AUTO: 0 K/UL (ref 0–0.5)
EOSINOPHIL NFR BLD: 0.1 % (ref 0–8)
ERYTHROCYTE [DISTWIDTH] IN BLOOD BY AUTOMATED COUNT: 13.2 % (ref 11.5–14.5)
EST. GFR  (AFRICAN AMERICAN): 4.5 ML/MIN/1.73 M^2
EST. GFR  (AFRICAN AMERICAN): 5 ML/MIN/1.73 M^2
EST. GFR  (AFRICAN AMERICAN): 5.1 ML/MIN/1.73 M^2
EST. GFR  (AFRICAN AMERICAN): 5.3 ML/MIN/1.73 M^2
EST. GFR  (AFRICAN AMERICAN): 5.6 ML/MIN/1.73 M^2
EST. GFR  (NON AFRICAN AMERICAN): 3.9 ML/MIN/1.73 M^2
EST. GFR  (NON AFRICAN AMERICAN): 4.3 ML/MIN/1.73 M^2
EST. GFR  (NON AFRICAN AMERICAN): 4.4 ML/MIN/1.73 M^2
EST. GFR  (NON AFRICAN AMERICAN): 4.6 ML/MIN/1.73 M^2
EST. GFR  (NON AFRICAN AMERICAN): 4.8 ML/MIN/1.73 M^2
ESTIMATED AVG GLUCOSE: 157 MG/DL (ref 68–131)
GLUCOSE SERPL-MCNC: 150 MG/DL (ref 70–110)
GLUCOSE SERPL-MCNC: 151 MG/DL (ref 70–110)
GLUCOSE SERPL-MCNC: 163 MG/DL (ref 70–110)
GLUCOSE SERPL-MCNC: 163 MG/DL (ref 70–110)
GLUCOSE SERPL-MCNC: 172 MG/DL (ref 70–110)
GLUCOSE UR QL STRIP: ABNORMAL
HBA1C MFR BLD: 7.1 % (ref 4–5.6)
HCO3 UR-SCNC: 19.6 MMOL/L (ref 24–28)
HCO3 UR-SCNC: 20.1 MMOL/L (ref 24–28)
HCT VFR BLD AUTO: 27.5 % (ref 40–54)
HGB BLD-MCNC: 9.1 G/DL (ref 14–18)
HGB UR QL STRIP: ABNORMAL
HYALINE CASTS UR QL AUTO: 0 /LPF
IMM GRANULOCYTES # BLD AUTO: 0.14 K/UL (ref 0–0.04)
IMM GRANULOCYTES NFR BLD AUTO: 1.3 % (ref 0–0.5)
INR PPP: 1.1 (ref 0.8–1.2)
KETONES UR QL STRIP: NEGATIVE
LACTATE SERPL-SCNC: 1.8 MMOL/L (ref 0.5–2.2)
LEUKOCYTE ESTERASE UR QL STRIP: ABNORMAL
LYMPHOCYTES # BLD AUTO: 0.3 K/UL (ref 1–4.8)
LYMPHOCYTES NFR BLD: 2.8 % (ref 18–48)
MAGNESIUM SERPL-MCNC: 1.8 MG/DL (ref 1.6–2.6)
MCH RBC QN AUTO: 30.4 PG (ref 27–31)
MCHC RBC AUTO-ENTMCNC: 33.1 G/DL (ref 32–36)
MCV RBC AUTO: 92 FL (ref 82–98)
MICROSCOPIC COMMENT: ABNORMAL
MONOCYTES # BLD AUTO: 1.5 K/UL (ref 0.3–1)
MONOCYTES NFR BLD: 13.8 % (ref 4–15)
NEUTROPHILS # BLD AUTO: 8.6 K/UL (ref 1.8–7.7)
NEUTROPHILS NFR BLD: 81.9 % (ref 38–73)
NITRITE UR QL STRIP: NEGATIVE
NRBC BLD-RTO: 0 /100 WBC
OSMOLALITY SERPL: 322 MOSM/KG (ref 280–300)
PCO2 BLDA: 46.1 MMHG (ref 35–45)
PCO2 BLDA: 47.5 MMHG (ref 35–45)
PH SMN: 7.24 [PH] (ref 7.35–7.45)
PH SMN: 7.24 [PH] (ref 7.35–7.45)
PH UR STRIP: 5 [PH] (ref 5–8)
PHOSPHATE SERPL-MCNC: 4.5 MG/DL (ref 2.7–4.5)
PHOSPHATE SERPL-MCNC: 4.7 MG/DL (ref 2.7–4.5)
PHOSPHATE SERPL-MCNC: 5.1 MG/DL (ref 2.7–4.5)
PHOSPHATE SERPL-MCNC: 5.2 MG/DL (ref 2.7–4.5)
PHOSPHATE SERPL-MCNC: 5.7 MG/DL (ref 2.7–4.5)
PLATELET # BLD AUTO: 126 K/UL (ref 150–450)
PMV BLD AUTO: 10.2 FL (ref 9.2–12.9)
PO2 BLDA: 33 MMHG (ref 40–60)
PO2 BLDA: 43 MMHG (ref 40–60)
POC BE: -7 MMOL/L
POC BE: -8 MMOL/L
POC SATURATED O2: 52 % (ref 95–100)
POC SATURATED O2: 69 % (ref 95–100)
POC TCO2: 21 MMOL/L (ref 24–29)
POC TCO2: 22 MMOL/L (ref 24–29)
POCT GLUCOSE: 147 MG/DL (ref 70–110)
POCT GLUCOSE: 150 MG/DL (ref 70–110)
POCT GLUCOSE: 165 MG/DL (ref 70–110)
POCT GLUCOSE: 168 MG/DL (ref 70–110)
POCT GLUCOSE: 172 MG/DL (ref 70–110)
POCT GLUCOSE: 174 MG/DL (ref 70–110)
POCT GLUCOSE: 183 MG/DL (ref 70–110)
POCT GLUCOSE: 184 MG/DL (ref 70–110)
POCT GLUCOSE: 192 MG/DL (ref 70–110)
POCT GLUCOSE: 198 MG/DL (ref 70–110)
POCT GLUCOSE: 204 MG/DL (ref 70–110)
POCT GLUCOSE: 204 MG/DL (ref 70–110)
POTASSIUM SERPL-SCNC: 4.4 MMOL/L (ref 3.5–5.1)
POTASSIUM SERPL-SCNC: 4.5 MMOL/L (ref 3.5–5.1)
POTASSIUM SERPL-SCNC: 4.6 MMOL/L (ref 3.5–5.1)
POTASSIUM SERPL-SCNC: 4.7 MMOL/L (ref 3.5–5.1)
POTASSIUM SERPL-SCNC: 4.7 MMOL/L (ref 3.5–5.1)
POTASSIUM UR-SCNC: <10 MMOL/L (ref 15–95)
PROT SERPL-MCNC: 5.7 G/DL (ref 6–8.4)
PROT UR QL STRIP: ABNORMAL
PROTHROMBIN TIME: 11.6 SEC (ref 9–12.5)
RBC # BLD AUTO: 2.99 M/UL (ref 4.6–6.2)
RBC #/AREA URNS AUTO: >100 /HPF (ref 0–4)
SAMPLE: ABNORMAL
SAMPLE: ABNORMAL
SITE: ABNORMAL
SITE: ABNORMAL
SODIUM SERPL-SCNC: 134 MMOL/L (ref 136–145)
SODIUM SERPL-SCNC: 135 MMOL/L (ref 136–145)
SODIUM SERPL-SCNC: 136 MMOL/L (ref 136–145)
SODIUM UR-SCNC: 74 MMOL/L (ref 20–250)
SP GR UR STRIP: 1.01 (ref 1–1.03)
TSH SERPL DL<=0.005 MIU/L-ACNC: 0.51 UIU/ML (ref 0.4–4)
URN SPEC COLLECT METH UR: ABNORMAL
WBC # BLD AUTO: 10.54 K/UL (ref 3.9–12.7)
WBC #/AREA URNS AUTO: 12 /HPF (ref 0–5)
YEAST UR QL AUTO: ABNORMAL

## 2021-06-16 PROCEDURE — 25000003 PHARM REV CODE 250: Performed by: INTERNAL MEDICINE

## 2021-06-16 PROCEDURE — 87205 SMEAR GRAM STAIN: CPT | Performed by: NURSE PRACTITIONER

## 2021-06-16 PROCEDURE — 85610 PROTHROMBIN TIME: CPT | Performed by: NURSE PRACTITIONER

## 2021-06-16 PROCEDURE — 87070 CULTURE OTHR SPECIMN AEROBIC: CPT | Performed by: NURSE PRACTITIONER

## 2021-06-16 PROCEDURE — 99900035 HC TECH TIME PER 15 MIN (STAT)

## 2021-06-16 PROCEDURE — 83930 ASSAY OF BLOOD OSMOLALITY: CPT | Performed by: NURSE PRACTITIONER

## 2021-06-16 PROCEDURE — 95718 PR EEG, W/VIDEO, CONT RECORD, I&R, 2-12 HRS: ICD-10-PCS | Mod: ,,, | Performed by: PSYCHIATRY & NEUROLOGY

## 2021-06-16 PROCEDURE — 84443 ASSAY THYROID STIM HORMONE: CPT | Performed by: NURSE PRACTITIONER

## 2021-06-16 PROCEDURE — 27000221 HC OXYGEN, UP TO 24 HOURS

## 2021-06-16 PROCEDURE — 83036 HEMOGLOBIN GLYCOSYLATED A1C: CPT | Performed by: NURSE PRACTITIONER

## 2021-06-16 PROCEDURE — 63600175 PHARM REV CODE 636 W HCPCS: Performed by: STUDENT IN AN ORGANIZED HEALTH CARE EDUCATION/TRAINING PROGRAM

## 2021-06-16 PROCEDURE — 84300 ASSAY OF URINE SODIUM: CPT | Performed by: NURSE PRACTITIONER

## 2021-06-16 PROCEDURE — 95718 EEG PHYS/QHP 2-12 HR W/VEEG: CPT | Mod: ,,, | Performed by: PSYCHIATRY & NEUROLOGY

## 2021-06-16 PROCEDURE — 87077 CULTURE AEROBIC IDENTIFY: CPT | Performed by: NURSE PRACTITIONER

## 2021-06-16 PROCEDURE — 99291 CRITICAL CARE FIRST HOUR: CPT | Mod: ,,, | Performed by: NURSE PRACTITIONER

## 2021-06-16 PROCEDURE — 82803 BLOOD GASES ANY COMBINATION: CPT

## 2021-06-16 PROCEDURE — S5010 5% DEXTROSE AND 0.45% SALINE: HCPCS | Performed by: STUDENT IN AN ORGANIZED HEALTH CARE EDUCATION/TRAINING PROGRAM

## 2021-06-16 PROCEDURE — 82140 ASSAY OF AMMONIA: CPT | Performed by: NURSE PRACTITIONER

## 2021-06-16 PROCEDURE — 87186 SC STD MICRODIL/AGAR DIL: CPT | Performed by: NURSE PRACTITIONER

## 2021-06-16 PROCEDURE — 80321 ALCOHOLS BIOMARKERS 1OR 2: CPT | Performed by: STUDENT IN AN ORGANIZED HEALTH CARE EDUCATION/TRAINING PROGRAM

## 2021-06-16 PROCEDURE — 80053 COMPREHEN METABOLIC PANEL: CPT | Performed by: NURSE PRACTITIONER

## 2021-06-16 PROCEDURE — 20000000 HC ICU ROOM

## 2021-06-16 PROCEDURE — 84133 ASSAY OF URINE POTASSIUM: CPT | Performed by: NURSE PRACTITIONER

## 2021-06-16 PROCEDURE — 25000003 PHARM REV CODE 250: Performed by: STUDENT IN AN ORGANIZED HEALTH CARE EDUCATION/TRAINING PROGRAM

## 2021-06-16 PROCEDURE — 87086 URINE CULTURE/COLONY COUNT: CPT | Performed by: NURSE PRACTITIONER

## 2021-06-16 PROCEDURE — 99900026 HC AIRWAY MAINTENANCE (STAT)

## 2021-06-16 PROCEDURE — 83605 ASSAY OF LACTIC ACID: CPT | Performed by: NURSE PRACTITIONER

## 2021-06-16 PROCEDURE — 94761 N-INVAS EAR/PLS OXIMETRY MLT: CPT

## 2021-06-16 PROCEDURE — 82010 KETONE BODYS QUAN: CPT | Performed by: NURSE PRACTITIONER

## 2021-06-16 PROCEDURE — 95714 VEEG EA 12-26 HR UNMNTR: CPT

## 2021-06-16 PROCEDURE — 86900 BLOOD TYPING SEROLOGIC ABO: CPT | Performed by: INTERNAL MEDICINE

## 2021-06-16 PROCEDURE — 25000003 PHARM REV CODE 250: Performed by: NURSE PRACTITIONER

## 2021-06-16 PROCEDURE — 80069 RENAL FUNCTION PANEL: CPT | Performed by: STUDENT IN AN ORGANIZED HEALTH CARE EDUCATION/TRAINING PROGRAM

## 2021-06-16 PROCEDURE — C9399 UNCLASSIFIED DRUGS OR BIOLOG: HCPCS | Performed by: STUDENT IN AN ORGANIZED HEALTH CARE EDUCATION/TRAINING PROGRAM

## 2021-06-16 PROCEDURE — 84100 ASSAY OF PHOSPHORUS: CPT | Performed by: NURSE PRACTITIONER

## 2021-06-16 PROCEDURE — 83735 ASSAY OF MAGNESIUM: CPT | Performed by: NURSE PRACTITIONER

## 2021-06-16 PROCEDURE — 85025 COMPLETE CBC W/AUTO DIFF WBC: CPT | Performed by: NURSE PRACTITIONER

## 2021-06-16 PROCEDURE — 95700 EEG CONT REC W/VID EEG TECH: CPT

## 2021-06-16 PROCEDURE — 82550 ASSAY OF CK (CPK): CPT | Performed by: NURSE PRACTITIONER

## 2021-06-16 PROCEDURE — 63600175 PHARM REV CODE 636 W HCPCS: Performed by: NURSE PRACTITIONER

## 2021-06-16 PROCEDURE — 36415 COLL VENOUS BLD VENIPUNCTURE: CPT | Performed by: NURSE PRACTITIONER

## 2021-06-16 PROCEDURE — 99291 PR CRITICAL CARE, E/M 30-74 MINUTES: ICD-10-PCS | Mod: ,,, | Performed by: NURSE PRACTITIONER

## 2021-06-16 PROCEDURE — 82436 ASSAY OF URINE CHLORIDE: CPT | Performed by: NURSE PRACTITIONER

## 2021-06-16 PROCEDURE — 86900 BLOOD TYPING SEROLOGIC ABO: CPT | Mod: 91 | Performed by: INTERNAL MEDICINE

## 2021-06-16 PROCEDURE — 82570 ASSAY OF URINE CREATININE: CPT | Performed by: NURSE PRACTITIONER

## 2021-06-16 PROCEDURE — 87040 BLOOD CULTURE FOR BACTERIA: CPT | Mod: 59 | Performed by: NURSE PRACTITIONER

## 2021-06-16 PROCEDURE — 81001 URINALYSIS AUTO W/SCOPE: CPT | Performed by: NURSE PRACTITIONER

## 2021-06-16 PROCEDURE — 80320 DRUG SCREEN QUANTALCOHOLS: CPT | Performed by: NURSE PRACTITIONER

## 2021-06-16 RX ORDER — DEXTROSE MONOHYDRATE 100 MG/ML
INJECTION, SOLUTION INTRAVENOUS
Status: DISCONTINUED | OUTPATIENT
Start: 2021-06-16 | End: 2021-06-25

## 2021-06-16 RX ORDER — SODIUM CHLORIDE 0.9 % (FLUSH) 0.9 %
10 SYRINGE (ML) INJECTION
Status: DISCONTINUED | OUTPATIENT
Start: 2021-06-16 | End: 2021-06-25

## 2021-06-16 RX ORDER — DEXTROSE MONOHYDRATE AND SODIUM CHLORIDE 5; .45 G/100ML; G/100ML
INJECTION, SOLUTION INTRAVENOUS CONTINUOUS
Status: DISCONTINUED | OUTPATIENT
Start: 2021-06-16 | End: 2021-06-16

## 2021-06-16 RX ORDER — GLUCAGON 1 MG
1 KIT INJECTION
Status: DISCONTINUED | OUTPATIENT
Start: 2021-06-16 | End: 2021-06-17

## 2021-06-16 RX ORDER — MUPIROCIN 20 MG/G
OINTMENT TOPICAL 2 TIMES DAILY
Status: DISPENSED | OUTPATIENT
Start: 2021-06-16 | End: 2021-06-21

## 2021-06-16 RX ORDER — SODIUM CHLORIDE, SODIUM LACTATE, POTASSIUM CHLORIDE, CALCIUM CHLORIDE 600; 310; 30; 20 MG/100ML; MG/100ML; MG/100ML; MG/100ML
INJECTION, SOLUTION INTRAVENOUS CONTINUOUS
Status: ACTIVE | OUTPATIENT
Start: 2021-06-16 | End: 2021-06-17

## 2021-06-16 RX ORDER — INSULIN ASPART 100 [IU]/ML
0-5 INJECTION, SOLUTION INTRAVENOUS; SUBCUTANEOUS EVERY 6 HOURS PRN
Status: DISCONTINUED | OUTPATIENT
Start: 2021-06-16 | End: 2021-06-17

## 2021-06-16 RX ORDER — ONDANSETRON 2 MG/ML
4 INJECTION INTRAMUSCULAR; INTRAVENOUS EVERY 8 HOURS PRN
Status: DISCONTINUED | OUTPATIENT
Start: 2021-06-16 | End: 2021-06-25

## 2021-06-16 RX ORDER — HEPARIN SODIUM 5000 [USP'U]/ML
5000 INJECTION, SOLUTION INTRAVENOUS; SUBCUTANEOUS EVERY 8 HOURS
Status: DISCONTINUED | OUTPATIENT
Start: 2021-06-16 | End: 2021-06-18

## 2021-06-16 RX ADMIN — DEXTROSE AND SODIUM CHLORIDE: 5; .45 INJECTION, SOLUTION INTRAVENOUS at 09:06

## 2021-06-16 RX ADMIN — SODIUM CHLORIDE 0.4 UNITS/HR: 9 INJECTION, SOLUTION INTRAVENOUS at 05:06

## 2021-06-16 RX ADMIN — SODIUM CHLORIDE, SODIUM LACTATE, POTASSIUM CHLORIDE, AND CALCIUM CHLORIDE: .6; .31; .03; .02 INJECTION, SOLUTION INTRAVENOUS at 03:06

## 2021-06-16 RX ADMIN — PIPERACILLIN SODIUM AND TAZOBACTAM SODIUM 4.5 G: 4; .5 INJECTION, POWDER, FOR SOLUTION INTRAVENOUS at 05:06

## 2021-06-16 RX ADMIN — MUPIROCIN: 20 OINTMENT TOPICAL at 09:06

## 2021-06-16 RX ADMIN — HEPARIN SODIUM 5000 UNITS: 5000 INJECTION INTRAVENOUS; SUBCUTANEOUS at 09:06

## 2021-06-16 RX ADMIN — PIPERACILLIN SODIUM AND TAZOBACTAM SODIUM 4.5 G: 4; .5 INJECTION, POWDER, FOR SOLUTION INTRAVENOUS at 04:06

## 2021-06-16 RX ADMIN — INSULIN DETEMIR 5 UNITS: 100 INJECTION, SOLUTION SUBCUTANEOUS at 02:06

## 2021-06-16 RX ADMIN — HEPARIN SODIUM 5000 UNITS: 5000 INJECTION INTRAVENOUS; SUBCUTANEOUS at 05:06

## 2021-06-16 RX ADMIN — HEPARIN SODIUM 5000 UNITS: 5000 INJECTION INTRAVENOUS; SUBCUTANEOUS at 02:06

## 2021-06-17 PROBLEM — N17.9 AKI (ACUTE KIDNEY INJURY): Status: ACTIVE | Noted: 2021-06-17

## 2021-06-17 PROBLEM — N17.9 ACUTE RENAL FAILURE: Status: RESOLVED | Noted: 2021-06-16 | Resolved: 2021-06-17

## 2021-06-17 PROBLEM — E87.20 METABOLIC ACIDOSIS: Status: ACTIVE | Noted: 2021-06-16

## 2021-06-17 LAB
ALBUMIN SERPL BCP-MCNC: 2.3 G/DL (ref 3.5–5.2)
ALBUMIN SERPL BCP-MCNC: 2.5 G/DL (ref 3.5–5.2)
ALBUMIN SERPL BCP-MCNC: 2.6 G/DL (ref 3.5–5.2)
ALBUMIN SERPL BCP-MCNC: 2.6 G/DL (ref 3.5–5.2)
ALLENS TEST: ABNORMAL
ALP SERPL-CCNC: 57 U/L (ref 55–135)
ALT SERPL W/O P-5'-P-CCNC: 10 U/L (ref 10–44)
ANION GAP SERPL CALC-SCNC: 13 MMOL/L (ref 8–16)
ANION GAP SERPL CALC-SCNC: 14 MMOL/L (ref 8–16)
ANION GAP SERPL CALC-SCNC: 14 MMOL/L (ref 8–16)
AST SERPL-CCNC: 20 U/L (ref 10–40)
BACTERIA UR CULT: NO GROWTH
BASOPHILS # BLD AUTO: 0.01 K/UL (ref 0–0.2)
BASOPHILS NFR BLD: 0.2 % (ref 0–1.9)
BILIRUB SERPL-MCNC: 0.3 MG/DL (ref 0.1–1)
BUN SERPL-MCNC: 100 MG/DL (ref 8–23)
BUN SERPL-MCNC: 101 MG/DL (ref 8–23)
BUN SERPL-MCNC: 102 MG/DL (ref 8–23)
BUN SERPL-MCNC: 102 MG/DL (ref 8–23)
BUN SERPL-MCNC: 104 MG/DL (ref 8–23)
BUN SERPL-MCNC: 95 MG/DL (ref 8–23)
BUN SERPL-MCNC: 98 MG/DL (ref 8–23)
CALCIUM SERPL-MCNC: 8.1 MG/DL (ref 8.7–10.5)
CALCIUM SERPL-MCNC: 8.4 MG/DL (ref 8.7–10.5)
CALCIUM SERPL-MCNC: 8.5 MG/DL (ref 8.7–10.5)
CALCIUM SERPL-MCNC: 8.5 MG/DL (ref 8.7–10.5)
CALCIUM SERPL-MCNC: 8.6 MG/DL (ref 8.7–10.5)
CHLORIDE SERPL-SCNC: 107 MMOL/L (ref 95–110)
CHLORIDE SERPL-SCNC: 108 MMOL/L (ref 95–110)
CHLORIDE SERPL-SCNC: 108 MMOL/L (ref 95–110)
CHLORIDE SERPL-SCNC: 109 MMOL/L (ref 95–110)
CHLORIDE SERPL-SCNC: 110 MMOL/L (ref 95–110)
CHLORIDE SERPL-SCNC: 110 MMOL/L (ref 95–110)
CHLORIDE SERPL-SCNC: 111 MMOL/L (ref 95–110)
CO2 SERPL-SCNC: 17 MMOL/L (ref 23–29)
CO2 SERPL-SCNC: 18 MMOL/L (ref 23–29)
CO2 SERPL-SCNC: 19 MMOL/L (ref 23–29)
CO2 SERPL-SCNC: 19 MMOL/L (ref 23–29)
CO2 SERPL-SCNC: 20 MMOL/L (ref 23–29)
CREAT SERPL-MCNC: 10 MG/DL (ref 0.5–1.4)
CREAT SERPL-MCNC: 10 MG/DL (ref 0.5–1.4)
CREAT SERPL-MCNC: 8 MG/DL (ref 0.5–1.4)
CREAT SERPL-MCNC: 8.5 MG/DL (ref 0.5–1.4)
CREAT SERPL-MCNC: 9 MG/DL (ref 0.5–1.4)
CREAT SERPL-MCNC: 9.4 MG/DL (ref 0.5–1.4)
CREAT SERPL-MCNC: 9.7 MG/DL (ref 0.5–1.4)
DIFFERENTIAL METHOD: ABNORMAL
EOSINOPHIL # BLD AUTO: 0 K/UL (ref 0–0.5)
EOSINOPHIL NFR BLD: 0.3 % (ref 0–8)
ERYTHROCYTE [DISTWIDTH] IN BLOOD BY AUTOMATED COUNT: 13 % (ref 11.5–14.5)
EST. GFR  (AFRICAN AMERICAN): 5.8 ML/MIN/1.73 M^2
EST. GFR  (AFRICAN AMERICAN): 5.8 ML/MIN/1.73 M^2
EST. GFR  (AFRICAN AMERICAN): 6 ML/MIN/1.73 M^2
EST. GFR  (AFRICAN AMERICAN): 6.2 ML/MIN/1.73 M^2
EST. GFR  (AFRICAN AMERICAN): 6.6 ML/MIN/1.73 M^2
EST. GFR  (AFRICAN AMERICAN): 7 ML/MIN/1.73 M^2
EST. GFR  (AFRICAN AMERICAN): 7.6 ML/MIN/1.73 M^2
EST. GFR  (NON AFRICAN AMERICAN): 5 ML/MIN/1.73 M^2
EST. GFR  (NON AFRICAN AMERICAN): 5 ML/MIN/1.73 M^2
EST. GFR  (NON AFRICAN AMERICAN): 5.2 ML/MIN/1.73 M^2
EST. GFR  (NON AFRICAN AMERICAN): 5.4 ML/MIN/1.73 M^2
EST. GFR  (NON AFRICAN AMERICAN): 5.7 ML/MIN/1.73 M^2
EST. GFR  (NON AFRICAN AMERICAN): 6.1 ML/MIN/1.73 M^2
EST. GFR  (NON AFRICAN AMERICAN): 6.5 ML/MIN/1.73 M^2
GLUCOSE SERPL-MCNC: 125 MG/DL (ref 70–110)
GLUCOSE SERPL-MCNC: 137 MG/DL (ref 70–110)
GLUCOSE SERPL-MCNC: 137 MG/DL (ref 70–110)
GLUCOSE SERPL-MCNC: 140 MG/DL (ref 70–110)
GLUCOSE SERPL-MCNC: 149 MG/DL (ref 70–110)
GLUCOSE SERPL-MCNC: 150 MG/DL (ref 70–110)
GLUCOSE SERPL-MCNC: 152 MG/DL (ref 70–110)
HCO3 UR-SCNC: 21 MMOL/L (ref 24–28)
HCT VFR BLD AUTO: 25.3 % (ref 40–54)
HGB BLD-MCNC: 8.2 G/DL (ref 14–18)
IMM GRANULOCYTES # BLD AUTO: 0.09 K/UL (ref 0–0.04)
IMM GRANULOCYTES NFR BLD AUTO: 1.5 % (ref 0–0.5)
LYMPHOCYTES # BLD AUTO: 0.4 K/UL (ref 1–4.8)
LYMPHOCYTES NFR BLD: 6.4 % (ref 18–48)
MAGNESIUM SERPL-MCNC: 1.7 MG/DL (ref 1.6–2.6)
MCH RBC QN AUTO: 30.5 PG (ref 27–31)
MCHC RBC AUTO-ENTMCNC: 32.4 G/DL (ref 32–36)
MCV RBC AUTO: 94 FL (ref 82–98)
MONOCYTES # BLD AUTO: 0.9 K/UL (ref 0.3–1)
MONOCYTES NFR BLD: 15 % (ref 4–15)
NEUTROPHILS # BLD AUTO: 4.7 K/UL (ref 1.8–7.7)
NEUTROPHILS NFR BLD: 76.6 % (ref 38–73)
NRBC BLD-RTO: 0 /100 WBC
PCO2 BLDA: 45 MMHG (ref 35–45)
PH SMN: 7.28 [PH] (ref 7.35–7.45)
PHOSPHATE SERPL-MCNC: 3.9 MG/DL (ref 2.7–4.5)
PHOSPHATE SERPL-MCNC: 4.1 MG/DL (ref 2.7–4.5)
PHOSPHATE SERPL-MCNC: 4.1 MG/DL (ref 2.7–4.5)
PHOSPHATE SERPL-MCNC: 4.4 MG/DL (ref 2.7–4.5)
PHOSPHATE SERPL-MCNC: 4.5 MG/DL (ref 2.7–4.5)
PHOSPHATE SERPL-MCNC: 4.7 MG/DL (ref 2.7–4.5)
PHOSPHATE SERPL-MCNC: 4.8 MG/DL (ref 2.7–4.5)
PLATELET # BLD AUTO: 109 K/UL (ref 150–450)
PMV BLD AUTO: 9.6 FL (ref 9.2–12.9)
PO2 BLDA: 42 MMHG (ref 40–60)
POC BE: -6 MMOL/L
POC SATURATED O2: 71 % (ref 95–100)
POC TCO2: 22 MMOL/L (ref 24–29)
POCT GLUCOSE: 151 MG/DL (ref 70–110)
POCT GLUCOSE: 153 MG/DL (ref 70–110)
POCT GLUCOSE: 155 MG/DL (ref 70–110)
POTASSIUM SERPL-SCNC: 4.4 MMOL/L (ref 3.5–5.1)
POTASSIUM SERPL-SCNC: 4.6 MMOL/L (ref 3.5–5.1)
POTASSIUM SERPL-SCNC: 4.6 MMOL/L (ref 3.5–5.1)
PROT SERPL-MCNC: 5.5 G/DL (ref 6–8.4)
RBC # BLD AUTO: 2.69 M/UL (ref 4.6–6.2)
SAMPLE: ABNORMAL
SITE: ABNORMAL
SODIUM SERPL-SCNC: 138 MMOL/L (ref 136–145)
SODIUM SERPL-SCNC: 139 MMOL/L (ref 136–145)
SODIUM SERPL-SCNC: 139 MMOL/L (ref 136–145)
SODIUM SERPL-SCNC: 141 MMOL/L (ref 136–145)
SODIUM SERPL-SCNC: 142 MMOL/L (ref 136–145)
VANCOMYCIN SERPL-MCNC: 15.5 UG/ML
WBC # BLD AUTO: 6.14 K/UL (ref 3.9–12.7)

## 2021-06-17 PROCEDURE — 99233 PR SUBSEQUENT HOSPITAL CARE,LEVL III: ICD-10-PCS | Mod: ,,, | Performed by: INTERNAL MEDICINE

## 2021-06-17 PROCEDURE — 80202 ASSAY OF VANCOMYCIN: CPT | Performed by: INTERNAL MEDICINE

## 2021-06-17 PROCEDURE — 97530 THERAPEUTIC ACTIVITIES: CPT

## 2021-06-17 PROCEDURE — 97535 SELF CARE MNGMENT TRAINING: CPT

## 2021-06-17 PROCEDURE — 97162 PT EVAL MOD COMPLEX 30 MIN: CPT

## 2021-06-17 PROCEDURE — 85025 COMPLETE CBC W/AUTO DIFF WBC: CPT | Performed by: NURSE PRACTITIONER

## 2021-06-17 PROCEDURE — 80069 RENAL FUNCTION PANEL: CPT | Performed by: STUDENT IN AN ORGANIZED HEALTH CARE EDUCATION/TRAINING PROGRAM

## 2021-06-17 PROCEDURE — 63600175 PHARM REV CODE 636 W HCPCS: Performed by: NURSE PRACTITIONER

## 2021-06-17 PROCEDURE — 36415 COLL VENOUS BLD VENIPUNCTURE: CPT | Performed by: INTERNAL MEDICINE

## 2021-06-17 PROCEDURE — 80053 COMPREHEN METABOLIC PANEL: CPT | Performed by: NURSE PRACTITIONER

## 2021-06-17 PROCEDURE — 27000221 HC OXYGEN, UP TO 24 HOURS

## 2021-06-17 PROCEDURE — 83735 ASSAY OF MAGNESIUM: CPT | Performed by: NURSE PRACTITIONER

## 2021-06-17 PROCEDURE — 25000003 PHARM REV CODE 250: Performed by: NURSE PRACTITIONER

## 2021-06-17 PROCEDURE — 97165 OT EVAL LOW COMPLEX 30 MIN: CPT

## 2021-06-17 PROCEDURE — 84100 ASSAY OF PHOSPHORUS: CPT | Performed by: NURSE PRACTITIONER

## 2021-06-17 PROCEDURE — 80069 RENAL FUNCTION PANEL: CPT | Mod: 91 | Performed by: STUDENT IN AN ORGANIZED HEALTH CARE EDUCATION/TRAINING PROGRAM

## 2021-06-17 PROCEDURE — 99233 SBSQ HOSP IP/OBS HIGH 50: CPT | Mod: ,,, | Performed by: INTERNAL MEDICINE

## 2021-06-17 PROCEDURE — 99900035 HC TECH TIME PER 15 MIN (STAT)

## 2021-06-17 PROCEDURE — 63600175 PHARM REV CODE 636 W HCPCS: Performed by: STUDENT IN AN ORGANIZED HEALTH CARE EDUCATION/TRAINING PROGRAM

## 2021-06-17 PROCEDURE — 94761 N-INVAS EAR/PLS OXIMETRY MLT: CPT

## 2021-06-17 PROCEDURE — 25000003 PHARM REV CODE 250: Performed by: STUDENT IN AN ORGANIZED HEALTH CARE EDUCATION/TRAINING PROGRAM

## 2021-06-17 PROCEDURE — 97116 GAIT TRAINING THERAPY: CPT

## 2021-06-17 PROCEDURE — 20000000 HC ICU ROOM

## 2021-06-17 RX ORDER — IBUPROFEN 200 MG
24 TABLET ORAL
Status: DISCONTINUED | OUTPATIENT
Start: 2021-06-17 | End: 2021-06-28 | Stop reason: HOSPADM

## 2021-06-17 RX ORDER — IBUPROFEN 200 MG
16 TABLET ORAL
Status: DISCONTINUED | OUTPATIENT
Start: 2021-06-17 | End: 2021-06-28 | Stop reason: HOSPADM

## 2021-06-17 RX ORDER — SODIUM CHLORIDE, SODIUM LACTATE, POTASSIUM CHLORIDE, CALCIUM CHLORIDE 600; 310; 30; 20 MG/100ML; MG/100ML; MG/100ML; MG/100ML
INJECTION, SOLUTION INTRAVENOUS CONTINUOUS
Status: DISCONTINUED | OUTPATIENT
Start: 2021-06-17 | End: 2021-06-18

## 2021-06-17 RX ORDER — INSULIN ASPART 100 [IU]/ML
0-5 INJECTION, SOLUTION INTRAVENOUS; SUBCUTANEOUS
Status: DISCONTINUED | OUTPATIENT
Start: 2021-06-17 | End: 2021-06-28 | Stop reason: HOSPADM

## 2021-06-17 RX ORDER — GLUCAGON 1 MG
1 KIT INJECTION
Status: DISCONTINUED | OUTPATIENT
Start: 2021-06-17 | End: 2021-06-28 | Stop reason: HOSPADM

## 2021-06-17 RX ORDER — BENZTROPINE MESYLATE 0.5 MG/1
0.5 TABLET ORAL 2 TIMES DAILY
Status: DISCONTINUED | OUTPATIENT
Start: 2021-06-17 | End: 2021-06-23

## 2021-06-17 RX ADMIN — BENZTROPINE MESYLATE 0.5 MG: 0.5 TABLET ORAL at 09:06

## 2021-06-17 RX ADMIN — MUPIROCIN: 20 OINTMENT TOPICAL at 09:06

## 2021-06-17 RX ADMIN — PIPERACILLIN SODIUM AND TAZOBACTAM SODIUM 4.5 G: 4; .5 INJECTION, POWDER, FOR SOLUTION INTRAVENOUS at 05:06

## 2021-06-17 RX ADMIN — INSULIN DETEMIR 5 UNITS: 100 INJECTION, SOLUTION SUBCUTANEOUS at 08:06

## 2021-06-17 RX ADMIN — MUPIROCIN: 20 OINTMENT TOPICAL at 08:06

## 2021-06-17 RX ADMIN — CEFTRIAXONE 2 G: 2 INJECTION, SOLUTION INTRAVENOUS at 06:06

## 2021-06-17 RX ADMIN — BENZTROPINE MESYLATE 0.5 MG: 0.5 TABLET ORAL at 01:06

## 2021-06-17 RX ADMIN — SODIUM CHLORIDE, SODIUM LACTATE, POTASSIUM CHLORIDE, AND CALCIUM CHLORIDE: .6; .31; .03; .02 INJECTION, SOLUTION INTRAVENOUS at 12:06

## 2021-06-17 RX ADMIN — HEPARIN SODIUM 5000 UNITS: 5000 INJECTION INTRAVENOUS; SUBCUTANEOUS at 02:06

## 2021-06-17 RX ADMIN — HEPARIN SODIUM 5000 UNITS: 5000 INJECTION INTRAVENOUS; SUBCUTANEOUS at 05:06

## 2021-06-17 RX ADMIN — HEPARIN SODIUM 5000 UNITS: 5000 INJECTION INTRAVENOUS; SUBCUTANEOUS at 09:06

## 2021-06-18 PROBLEM — R79.89 ABNORMAL TSH: Status: RESOLVED | Noted: 2020-03-24 | Resolved: 2021-06-18

## 2021-06-18 PROBLEM — E78.2 MIXED HYPERLIPIDEMIA: Chronic | Status: ACTIVE | Noted: 2017-03-09

## 2021-06-18 PROBLEM — A41.9 SEPSIS: Status: RESOLVED | Noted: 2021-06-16 | Resolved: 2021-06-18

## 2021-06-18 PROBLEM — K92.1 MELENA: Status: ACTIVE | Noted: 2021-06-18

## 2021-06-18 PROBLEM — E11.10 DIABETIC KETOACIDOSIS WITHOUT COMA ASSOCIATED WITH TYPE 2 DIABETES MELLITUS: Status: RESOLVED | Noted: 2021-06-16 | Resolved: 2021-06-18

## 2021-06-18 LAB
ACETONE SERPL-MCNC: 13 MG/DL
ALBUMIN SERPL BCP-MCNC: 2.3 G/DL (ref 3.5–5.2)
ALBUMIN SERPL BCP-MCNC: 2.4 G/DL (ref 3.5–5.2)
ALBUMIN SERPL BCP-MCNC: 2.4 G/DL (ref 3.5–5.2)
ALBUMIN SERPL BCP-MCNC: 2.5 G/DL (ref 3.5–5.2)
ALP SERPL-CCNC: 64 U/L (ref 55–135)
ALT SERPL W/O P-5'-P-CCNC: 61 U/L (ref 10–44)
ANION GAP SERPL CALC-SCNC: 13 MMOL/L (ref 8–16)
ANION GAP SERPL CALC-SCNC: 14 MMOL/L (ref 8–16)
AST SERPL-CCNC: 74 U/L (ref 10–40)
BASOPHILS # BLD AUTO: 0.01 K/UL (ref 0–0.2)
BASOPHILS NFR BLD: 0.2 % (ref 0–1.9)
BILIRUB SERPL-MCNC: 0.2 MG/DL (ref 0.1–1)
BUN SERPL-MCNC: 77 MG/DL (ref 8–23)
BUN SERPL-MCNC: 79 MG/DL (ref 8–23)
BUN SERPL-MCNC: 84 MG/DL (ref 8–23)
BUN SERPL-MCNC: 89 MG/DL (ref 8–23)
CALCIUM SERPL-MCNC: 8.1 MG/DL (ref 8.7–10.5)
CALCIUM SERPL-MCNC: 8.2 MG/DL (ref 8.7–10.5)
CALCIUM SERPL-MCNC: 8.3 MG/DL (ref 8.7–10.5)
CALCIUM SERPL-MCNC: 8.4 MG/DL (ref 8.7–10.5)
CHLORIDE SERPL-SCNC: 106 MMOL/L (ref 95–110)
CHLORIDE SERPL-SCNC: 108 MMOL/L (ref 95–110)
CO2 SERPL-SCNC: 16 MMOL/L (ref 23–29)
CO2 SERPL-SCNC: 17 MMOL/L (ref 23–29)
CO2 SERPL-SCNC: 18 MMOL/L (ref 23–29)
CO2 SERPL-SCNC: 18 MMOL/L (ref 23–29)
CREAT SERPL-MCNC: 5.2 MG/DL (ref 0.5–1.4)
CREAT SERPL-MCNC: 5.7 MG/DL (ref 0.5–1.4)
CREAT SERPL-MCNC: 6.2 MG/DL (ref 0.5–1.4)
CREAT SERPL-MCNC: 7 MG/DL (ref 0.5–1.4)
DIFFERENTIAL METHOD: ABNORMAL
EOSINOPHIL # BLD AUTO: 0 K/UL (ref 0–0.5)
EOSINOPHIL NFR BLD: 0.4 % (ref 0–8)
ERYTHROCYTE [DISTWIDTH] IN BLOOD BY AUTOMATED COUNT: 12.7 % (ref 11.5–14.5)
EST. GFR  (AFRICAN AMERICAN): 10.3 ML/MIN/1.73 M^2
EST. GFR  (AFRICAN AMERICAN): 11.4 ML/MIN/1.73 M^2
EST. GFR  (AFRICAN AMERICAN): 12.7 ML/MIN/1.73 M^2
EST. GFR  (AFRICAN AMERICAN): 8.9 ML/MIN/1.73 M^2
EST. GFR  (NON AFRICAN AMERICAN): 11 ML/MIN/1.73 M^2
EST. GFR  (NON AFRICAN AMERICAN): 7.7 ML/MIN/1.73 M^2
EST. GFR  (NON AFRICAN AMERICAN): 8.9 ML/MIN/1.73 M^2
EST. GFR  (NON AFRICAN AMERICAN): 9.9 ML/MIN/1.73 M^2
ETHANOL SERPL-MCNC: NOT DETECTED MG/DL
GLUCOSE SERPL-MCNC: 110 MG/DL (ref 70–110)
GLUCOSE SERPL-MCNC: 114 MG/DL (ref 70–110)
GLUCOSE SERPL-MCNC: 122 MG/DL (ref 70–110)
GLUCOSE SERPL-MCNC: 131 MG/DL (ref 70–110)
HCT VFR BLD AUTO: 24 % (ref 40–54)
HGB BLD-MCNC: 8 G/DL (ref 14–18)
IMM GRANULOCYTES # BLD AUTO: 0.06 K/UL (ref 0–0.04)
IMM GRANULOCYTES NFR BLD AUTO: 1.1 % (ref 0–0.5)
ISOPROPANOL SERPL-MCNC: NOT DETECTED MG/DL
LYMPHOCYTES # BLD AUTO: 0.4 K/UL (ref 1–4.8)
LYMPHOCYTES NFR BLD: 7.6 % (ref 18–48)
MAGNESIUM SERPL-MCNC: 1.5 MG/DL (ref 1.6–2.6)
MCH RBC QN AUTO: 30.8 PG (ref 27–31)
MCHC RBC AUTO-ENTMCNC: 33.3 G/DL (ref 32–36)
MCV RBC AUTO: 92 FL (ref 82–98)
METHANOL SERPL-MCNC: NOT DETECTED MG/DL
MONOCYTES # BLD AUTO: 0.7 K/UL (ref 0.3–1)
MONOCYTES NFR BLD: 12.4 % (ref 4–15)
NEUTROPHILS # BLD AUTO: 4.4 K/UL (ref 1.8–7.7)
NEUTROPHILS NFR BLD: 78.3 % (ref 38–73)
NRBC BLD-RTO: 0 /100 WBC
PHOSPHATE SERPL-MCNC: 3.2 MG/DL (ref 2.7–4.5)
PHOSPHATE SERPL-MCNC: 3.4 MG/DL (ref 2.7–4.5)
PHOSPHATE SERPL-MCNC: 3.6 MG/DL (ref 2.7–4.5)
PHOSPHATE SERPL-MCNC: 3.8 MG/DL (ref 2.7–4.5)
PLATELET # BLD AUTO: 115 K/UL (ref 150–450)
PMV BLD AUTO: 10.1 FL (ref 9.2–12.9)
POCT GLUCOSE: 127 MG/DL (ref 70–110)
POCT GLUCOSE: 131 MG/DL (ref 70–110)
POCT GLUCOSE: 134 MG/DL (ref 70–110)
POCT GLUCOSE: 227 MG/DL (ref 70–110)
POTASSIUM SERPL-SCNC: 4.2 MMOL/L (ref 3.5–5.1)
POTASSIUM SERPL-SCNC: 4.3 MMOL/L (ref 3.5–5.1)
POTASSIUM SERPL-SCNC: 4.3 MMOL/L (ref 3.5–5.1)
POTASSIUM SERPL-SCNC: 4.4 MMOL/L (ref 3.5–5.1)
PROT SERPL-MCNC: 5.3 G/DL (ref 6–8.4)
RBC # BLD AUTO: 2.6 M/UL (ref 4.6–6.2)
SODIUM SERPL-SCNC: 136 MMOL/L (ref 136–145)
SODIUM SERPL-SCNC: 137 MMOL/L (ref 136–145)
SODIUM SERPL-SCNC: 139 MMOL/L (ref 136–145)
SODIUM SERPL-SCNC: 140 MMOL/L (ref 136–145)
VOLATILE SCREEN SERUM, CHAIN OF CUSTODY: ABNORMAL
VOLATILES SERPL: ABNORMAL
WBC # BLD AUTO: 5.66 K/UL (ref 3.9–12.7)

## 2021-06-18 PROCEDURE — 27000221 HC OXYGEN, UP TO 24 HOURS

## 2021-06-18 PROCEDURE — 99223 PR INITIAL HOSPITAL CARE,LEVL III: ICD-10-PCS | Mod: ,,, | Performed by: PSYCHIATRY & NEUROLOGY

## 2021-06-18 PROCEDURE — 99223 1ST HOSP IP/OBS HIGH 75: CPT | Mod: ,,, | Performed by: PSYCHIATRY & NEUROLOGY

## 2021-06-18 PROCEDURE — 25000003 PHARM REV CODE 250: Performed by: INTERNAL MEDICINE

## 2021-06-18 PROCEDURE — 63600175 PHARM REV CODE 636 W HCPCS: Performed by: NURSE PRACTITIONER

## 2021-06-18 PROCEDURE — 25000003 PHARM REV CODE 250: Performed by: HOSPITALIST

## 2021-06-18 PROCEDURE — 20600001 HC STEP DOWN PRIVATE ROOM

## 2021-06-18 PROCEDURE — 63600175 PHARM REV CODE 636 W HCPCS: Performed by: STUDENT IN AN ORGANIZED HEALTH CARE EDUCATION/TRAINING PROGRAM

## 2021-06-18 PROCEDURE — 25000003 PHARM REV CODE 250: Performed by: STUDENT IN AN ORGANIZED HEALTH CARE EDUCATION/TRAINING PROGRAM

## 2021-06-18 PROCEDURE — 80053 COMPREHEN METABOLIC PANEL: CPT | Performed by: NURSE PRACTITIONER

## 2021-06-18 PROCEDURE — 99233 PR SUBSEQUENT HOSPITAL CARE,LEVL III: ICD-10-PCS | Mod: GC,,, | Performed by: INTERNAL MEDICINE

## 2021-06-18 PROCEDURE — 99900035 HC TECH TIME PER 15 MIN (STAT)

## 2021-06-18 PROCEDURE — 83735 ASSAY OF MAGNESIUM: CPT | Performed by: NURSE PRACTITIONER

## 2021-06-18 PROCEDURE — 99223 1ST HOSP IP/OBS HIGH 75: CPT | Mod: ,,, | Performed by: INTERNAL MEDICINE

## 2021-06-18 PROCEDURE — 36415 COLL VENOUS BLD VENIPUNCTURE: CPT | Performed by: STUDENT IN AN ORGANIZED HEALTH CARE EDUCATION/TRAINING PROGRAM

## 2021-06-18 PROCEDURE — 25000003 PHARM REV CODE 250: Performed by: NURSE PRACTITIONER

## 2021-06-18 PROCEDURE — 99233 PR SUBSEQUENT HOSPITAL CARE,LEVL III: ICD-10-PCS | Mod: ,,, | Performed by: INTERNAL MEDICINE

## 2021-06-18 PROCEDURE — 94761 N-INVAS EAR/PLS OXIMETRY MLT: CPT

## 2021-06-18 PROCEDURE — 99233 SBSQ HOSP IP/OBS HIGH 50: CPT | Mod: GC,,, | Performed by: INTERNAL MEDICINE

## 2021-06-18 PROCEDURE — 99233 SBSQ HOSP IP/OBS HIGH 50: CPT | Mod: ,,, | Performed by: INTERNAL MEDICINE

## 2021-06-18 PROCEDURE — 99223 PR INITIAL HOSPITAL CARE,LEVL III: ICD-10-PCS | Mod: ,,, | Performed by: INTERNAL MEDICINE

## 2021-06-18 PROCEDURE — 85025 COMPLETE CBC W/AUTO DIFF WBC: CPT | Performed by: NURSE PRACTITIONER

## 2021-06-18 PROCEDURE — 84100 ASSAY OF PHOSPHORUS: CPT | Performed by: NURSE PRACTITIONER

## 2021-06-18 PROCEDURE — 80069 RENAL FUNCTION PANEL: CPT | Mod: 91 | Performed by: STUDENT IN AN ORGANIZED HEALTH CARE EDUCATION/TRAINING PROGRAM

## 2021-06-18 RX ORDER — ATORVASTATIN CALCIUM 20 MG/1
20 TABLET, FILM COATED ORAL NIGHTLY
Status: DISCONTINUED | OUTPATIENT
Start: 2021-06-18 | End: 2021-06-18

## 2021-06-18 RX ORDER — PANTOPRAZOLE SODIUM 40 MG/1
40 TABLET, DELAYED RELEASE ORAL
Status: DISCONTINUED | OUTPATIENT
Start: 2021-06-18 | End: 2021-06-28 | Stop reason: HOSPADM

## 2021-06-18 RX ORDER — SODIUM BICARBONATE 650 MG/1
650 TABLET ORAL 3 TIMES DAILY
Status: COMPLETED | OUTPATIENT
Start: 2021-06-18 | End: 2021-06-19

## 2021-06-18 RX ORDER — SODIUM CHLORIDE, SODIUM LACTATE, POTASSIUM CHLORIDE, CALCIUM CHLORIDE 600; 310; 30; 20 MG/100ML; MG/100ML; MG/100ML; MG/100ML
INJECTION, SOLUTION INTRAVENOUS CONTINUOUS
Status: ACTIVE | OUTPATIENT
Start: 2021-06-18 | End: 2021-06-19

## 2021-06-18 RX ORDER — MAGNESIUM SULFATE HEPTAHYDRATE 40 MG/ML
2 INJECTION, SOLUTION INTRAVENOUS ONCE
Status: COMPLETED | OUTPATIENT
Start: 2021-06-18 | End: 2021-06-18

## 2021-06-18 RX ADMIN — BENZTROPINE MESYLATE 0.5 MG: 0.5 TABLET ORAL at 10:06

## 2021-06-18 RX ADMIN — HEPARIN SODIUM 5000 UNITS: 5000 INJECTION INTRAVENOUS; SUBCUTANEOUS at 02:06

## 2021-06-18 RX ADMIN — INSULIN ASPART 1 UNITS: 100 INJECTION, SOLUTION INTRAVENOUS; SUBCUTANEOUS at 09:06

## 2021-06-18 RX ADMIN — SODIUM BICARBONATE 650 MG TABLET 650 MG: at 01:06

## 2021-06-18 RX ADMIN — PANTOPRAZOLE SODIUM 40 MG: 40 TABLET, DELAYED RELEASE ORAL at 05:06

## 2021-06-18 RX ADMIN — MAGNESIUM SULFATE 2 G: 2 INJECTION INTRAVENOUS at 08:06

## 2021-06-18 RX ADMIN — CEFTRIAXONE 2 G: 2 INJECTION, SOLUTION INTRAVENOUS at 05:06

## 2021-06-18 RX ADMIN — SODIUM CHLORIDE, SODIUM LACTATE, POTASSIUM CHLORIDE, AND CALCIUM CHLORIDE: .6; .31; .03; .02 INJECTION, SOLUTION INTRAVENOUS at 02:06

## 2021-06-18 RX ADMIN — MUPIROCIN: 20 OINTMENT TOPICAL at 08:06

## 2021-06-18 RX ADMIN — HEPARIN SODIUM 5000 UNITS: 5000 INJECTION INTRAVENOUS; SUBCUTANEOUS at 06:06

## 2021-06-18 RX ADMIN — BENZTROPINE MESYLATE 0.5 MG: 0.5 TABLET ORAL at 09:06

## 2021-06-18 RX ADMIN — SODIUM BICARBONATE 650 MG TABLET 650 MG: at 02:06

## 2021-06-18 RX ADMIN — MUPIROCIN: 20 OINTMENT TOPICAL at 09:06

## 2021-06-18 RX ADMIN — INSULIN DETEMIR 5 UNITS: 100 INJECTION, SOLUTION SUBCUTANEOUS at 08:06

## 2021-06-18 RX ADMIN — SODIUM BICARBONATE 650 MG TABLET 650 MG: at 09:06

## 2021-06-18 RX ADMIN — SODIUM CHLORIDE, SODIUM LACTATE, POTASSIUM CHLORIDE, AND CALCIUM CHLORIDE: .6; .31; .03; .02 INJECTION, SOLUTION INTRAVENOUS at 12:06

## 2021-06-19 LAB
ALBUMIN SERPL BCP-MCNC: 2.3 G/DL (ref 3.5–5.2)
ALBUMIN SERPL BCP-MCNC: 2.4 G/DL (ref 3.5–5.2)
ALBUMIN SERPL BCP-MCNC: 2.4 G/DL (ref 3.5–5.2)
ALBUMIN SERPL BCP-MCNC: 2.5 G/DL (ref 3.5–5.2)
ALBUMIN SERPL BCP-MCNC: 2.6 G/DL (ref 3.5–5.2)
ALBUMIN SERPL BCP-MCNC: 2.7 G/DL (ref 3.5–5.2)
ALP SERPL-CCNC: 62 U/L (ref 55–135)
ALT SERPL W/O P-5'-P-CCNC: 51 U/L (ref 10–44)
ANION GAP SERPL CALC-SCNC: 11 MMOL/L (ref 8–16)
ANION GAP SERPL CALC-SCNC: 13 MMOL/L (ref 8–16)
ANION GAP SERPL CALC-SCNC: 14 MMOL/L (ref 8–16)
ANION GAP SERPL CALC-SCNC: 15 MMOL/L (ref 8–16)
AST SERPL-CCNC: 34 U/L (ref 10–40)
BACTERIA SPEC AEROBE CULT: ABNORMAL
BACTERIA SPEC AEROBE CULT: ABNORMAL
BASOPHILS # BLD AUTO: 0.01 K/UL (ref 0–0.2)
BASOPHILS NFR BLD: 0.2 % (ref 0–1.9)
BILIRUB SERPL-MCNC: 0.2 MG/DL (ref 0.1–1)
BUN SERPL-MCNC: 44 MG/DL (ref 8–23)
BUN SERPL-MCNC: 48 MG/DL (ref 8–23)
BUN SERPL-MCNC: 53 MG/DL (ref 8–23)
BUN SERPL-MCNC: 57 MG/DL (ref 8–23)
BUN SERPL-MCNC: 61 MG/DL (ref 8–23)
BUN SERPL-MCNC: 70 MG/DL (ref 8–23)
CALCIUM SERPL-MCNC: 8.2 MG/DL (ref 8.7–10.5)
CALCIUM SERPL-MCNC: 8.3 MG/DL (ref 8.7–10.5)
CALCIUM SERPL-MCNC: 8.3 MG/DL (ref 8.7–10.5)
CALCIUM SERPL-MCNC: 8.5 MG/DL (ref 8.7–10.5)
CALCIUM SERPL-MCNC: 8.6 MG/DL (ref 8.7–10.5)
CALCIUM SERPL-MCNC: 8.7 MG/DL (ref 8.7–10.5)
CHLORIDE SERPL-SCNC: 107 MMOL/L (ref 95–110)
CHLORIDE SERPL-SCNC: 107 MMOL/L (ref 95–110)
CHLORIDE SERPL-SCNC: 108 MMOL/L (ref 95–110)
CHLORIDE SERPL-SCNC: 109 MMOL/L (ref 95–110)
CHLORIDE SERPL-SCNC: 109 MMOL/L (ref 95–110)
CHLORIDE SERPL-SCNC: 111 MMOL/L (ref 95–110)
CO2 SERPL-SCNC: 17 MMOL/L (ref 23–29)
CO2 SERPL-SCNC: 18 MMOL/L (ref 23–29)
CO2 SERPL-SCNC: 19 MMOL/L (ref 23–29)
CO2 SERPL-SCNC: 20 MMOL/L (ref 23–29)
CO2 SERPL-SCNC: 21 MMOL/L (ref 23–29)
CO2 SERPL-SCNC: 23 MMOL/L (ref 23–29)
CREAT SERPL-MCNC: 2.6 MG/DL (ref 0.5–1.4)
CREAT SERPL-MCNC: 2.7 MG/DL (ref 0.5–1.4)
CREAT SERPL-MCNC: 3.3 MG/DL (ref 0.5–1.4)
CREAT SERPL-MCNC: 3.4 MG/DL (ref 0.5–1.4)
CREAT SERPL-MCNC: 3.7 MG/DL (ref 0.5–1.4)
CREAT SERPL-MCNC: 4.4 MG/DL (ref 0.5–1.4)
DIFFERENTIAL METHOD: ABNORMAL
EOSINOPHIL # BLD AUTO: 0 K/UL (ref 0–0.5)
EOSINOPHIL NFR BLD: 0.7 % (ref 0–8)
ERYTHROCYTE [DISTWIDTH] IN BLOOD BY AUTOMATED COUNT: 12.4 % (ref 11.5–14.5)
EST. GFR  (AFRICAN AMERICAN): 15.6 ML/MIN/1.73 M^2
EST. GFR  (AFRICAN AMERICAN): 19.2 ML/MIN/1.73 M^2
EST. GFR  (AFRICAN AMERICAN): 21.3 ML/MIN/1.73 M^2
EST. GFR  (AFRICAN AMERICAN): 22.1 ML/MIN/1.73 M^2
EST. GFR  (AFRICAN AMERICAN): 28.1 ML/MIN/1.73 M^2
EST. GFR  (AFRICAN AMERICAN): 29.4 ML/MIN/1.73 M^2
EST. GFR  (NON AFRICAN AMERICAN): 13.5 ML/MIN/1.73 M^2
EST. GFR  (NON AFRICAN AMERICAN): 16.6 ML/MIN/1.73 M^2
EST. GFR  (NON AFRICAN AMERICAN): 18.4 ML/MIN/1.73 M^2
EST. GFR  (NON AFRICAN AMERICAN): 19.1 ML/MIN/1.73 M^2
EST. GFR  (NON AFRICAN AMERICAN): 24.3 ML/MIN/1.73 M^2
EST. GFR  (NON AFRICAN AMERICAN): 25.5 ML/MIN/1.73 M^2
GLUCOSE SERPL-MCNC: 112 MG/DL (ref 70–110)
GLUCOSE SERPL-MCNC: 117 MG/DL (ref 70–110)
GLUCOSE SERPL-MCNC: 132 MG/DL (ref 70–110)
GLUCOSE SERPL-MCNC: 134 MG/DL (ref 70–110)
GRAM STN SPEC: ABNORMAL
HCT VFR BLD AUTO: 23 % (ref 40–54)
HGB BLD-MCNC: 7.9 G/DL (ref 14–18)
IMM GRANULOCYTES # BLD AUTO: 0.07 K/UL (ref 0–0.04)
IMM GRANULOCYTES NFR BLD AUTO: 1.2 % (ref 0–0.5)
LYMPHOCYTES # BLD AUTO: 0.5 K/UL (ref 1–4.8)
LYMPHOCYTES NFR BLD: 8 % (ref 18–48)
MAGNESIUM SERPL-MCNC: 1.4 MG/DL (ref 1.6–2.6)
MCH RBC QN AUTO: 30.9 PG (ref 27–31)
MCHC RBC AUTO-ENTMCNC: 34.3 G/DL (ref 32–36)
MCV RBC AUTO: 90 FL (ref 82–98)
MONOCYTES # BLD AUTO: 0.7 K/UL (ref 0.3–1)
MONOCYTES NFR BLD: 11.8 % (ref 4–15)
NEUTROPHILS # BLD AUTO: 4.7 K/UL (ref 1.8–7.7)
NEUTROPHILS NFR BLD: 78.1 % (ref 38–73)
NRBC BLD-RTO: 0 /100 WBC
PHOSPHATE SERPL-MCNC: 1.9 MG/DL (ref 2.7–4.5)
PHOSPHATE SERPL-MCNC: 2.2 MG/DL (ref 2.7–4.5)
PHOSPHATE SERPL-MCNC: 2.3 MG/DL (ref 2.7–4.5)
PHOSPHATE SERPL-MCNC: 2.3 MG/DL (ref 2.7–4.5)
PHOSPHATE SERPL-MCNC: 2.7 MG/DL (ref 2.7–4.5)
PHOSPHATE SERPL-MCNC: 2.8 MG/DL (ref 2.7–4.5)
PLATELET # BLD AUTO: 128 K/UL (ref 150–450)
PMV BLD AUTO: 9.5 FL (ref 9.2–12.9)
POCT GLUCOSE: 120 MG/DL (ref 70–110)
POCT GLUCOSE: 124 MG/DL (ref 70–110)
POCT GLUCOSE: 141 MG/DL (ref 70–110)
POTASSIUM SERPL-SCNC: 3.6 MMOL/L (ref 3.5–5.1)
POTASSIUM SERPL-SCNC: 3.7 MMOL/L (ref 3.5–5.1)
POTASSIUM SERPL-SCNC: 3.9 MMOL/L (ref 3.5–5.1)
POTASSIUM SERPL-SCNC: 3.9 MMOL/L (ref 3.5–5.1)
POTASSIUM SERPL-SCNC: 4.3 MMOL/L (ref 3.5–5.1)
POTASSIUM SERPL-SCNC: 4.5 MMOL/L (ref 3.5–5.1)
PROT SERPL-MCNC: 5.2 G/DL (ref 6–8.4)
RBC # BLD AUTO: 2.56 M/UL (ref 4.6–6.2)
SODIUM SERPL-SCNC: 140 MMOL/L (ref 136–145)
SODIUM SERPL-SCNC: 141 MMOL/L (ref 136–145)
SODIUM SERPL-SCNC: 141 MMOL/L (ref 136–145)
SODIUM SERPL-SCNC: 142 MMOL/L (ref 136–145)
SODIUM SERPL-SCNC: 142 MMOL/L (ref 136–145)
SODIUM SERPL-SCNC: 144 MMOL/L (ref 136–145)
WBC # BLD AUTO: 6 K/UL (ref 3.9–12.7)

## 2021-06-19 PROCEDURE — 80069 RENAL FUNCTION PANEL: CPT | Mod: 91 | Performed by: STUDENT IN AN ORGANIZED HEALTH CARE EDUCATION/TRAINING PROGRAM

## 2021-06-19 PROCEDURE — 99232 PR SUBSEQUENT HOSPITAL CARE,LEVL II: ICD-10-PCS | Mod: ,,, | Performed by: INTERNAL MEDICINE

## 2021-06-19 PROCEDURE — 25000003 PHARM REV CODE 250: Performed by: STUDENT IN AN ORGANIZED HEALTH CARE EDUCATION/TRAINING PROGRAM

## 2021-06-19 PROCEDURE — 20600001 HC STEP DOWN PRIVATE ROOM

## 2021-06-19 PROCEDURE — 83735 ASSAY OF MAGNESIUM: CPT | Performed by: NURSE PRACTITIONER

## 2021-06-19 PROCEDURE — C9399 UNCLASSIFIED DRUGS OR BIOLOG: HCPCS | Performed by: STUDENT IN AN ORGANIZED HEALTH CARE EDUCATION/TRAINING PROGRAM

## 2021-06-19 PROCEDURE — 80053 COMPREHEN METABOLIC PANEL: CPT | Performed by: NURSE PRACTITIONER

## 2021-06-19 PROCEDURE — 25000003 PHARM REV CODE 250: Performed by: HOSPITALIST

## 2021-06-19 PROCEDURE — 25000003 PHARM REV CODE 250: Performed by: NURSE PRACTITIONER

## 2021-06-19 PROCEDURE — 63600175 PHARM REV CODE 636 W HCPCS: Performed by: STUDENT IN AN ORGANIZED HEALTH CARE EDUCATION/TRAINING PROGRAM

## 2021-06-19 PROCEDURE — 85025 COMPLETE CBC W/AUTO DIFF WBC: CPT | Performed by: NURSE PRACTITIONER

## 2021-06-19 PROCEDURE — 36415 COLL VENOUS BLD VENIPUNCTURE: CPT | Performed by: STUDENT IN AN ORGANIZED HEALTH CARE EDUCATION/TRAINING PROGRAM

## 2021-06-19 PROCEDURE — 84100 ASSAY OF PHOSPHORUS: CPT | Performed by: NURSE PRACTITIONER

## 2021-06-19 PROCEDURE — 94761 N-INVAS EAR/PLS OXIMETRY MLT: CPT

## 2021-06-19 PROCEDURE — 99232 SBSQ HOSP IP/OBS MODERATE 35: CPT | Mod: ,,, | Performed by: INTERNAL MEDICINE

## 2021-06-19 RX ADMIN — SODIUM CHLORIDE, SODIUM LACTATE, POTASSIUM CHLORIDE, AND CALCIUM CHLORIDE: .6; .31; .03; .02 INJECTION, SOLUTION INTRAVENOUS at 05:06

## 2021-06-19 RX ADMIN — SODIUM BICARBONATE 650 MG TABLET 650 MG: at 04:06

## 2021-06-19 RX ADMIN — PANTOPRAZOLE SODIUM 40 MG: 40 TABLET, DELAYED RELEASE ORAL at 04:06

## 2021-06-19 RX ADMIN — BENZTROPINE MESYLATE 0.5 MG: 0.5 TABLET ORAL at 08:06

## 2021-06-19 RX ADMIN — CEFTRIAXONE 2 G: 2 INJECTION, SOLUTION INTRAVENOUS at 04:06

## 2021-06-19 RX ADMIN — INSULIN DETEMIR 5 UNITS: 100 INJECTION, SOLUTION SUBCUTANEOUS at 08:06

## 2021-06-19 RX ADMIN — MUPIROCIN: 20 OINTMENT TOPICAL at 08:06

## 2021-06-19 RX ADMIN — SODIUM BICARBONATE 650 MG TABLET 650 MG: at 08:06

## 2021-06-19 RX ADMIN — PANTOPRAZOLE SODIUM 40 MG: 40 TABLET, DELAYED RELEASE ORAL at 05:06

## 2021-06-20 LAB
ALBUMIN SERPL BCP-MCNC: 2.4 G/DL (ref 3.5–5.2)
ALBUMIN SERPL BCP-MCNC: 2.4 G/DL (ref 3.5–5.2)
ALBUMIN SERPL BCP-MCNC: 2.6 G/DL (ref 3.5–5.2)
ALBUMIN SERPL BCP-MCNC: 2.7 G/DL (ref 3.5–5.2)
ALP SERPL-CCNC: 68 U/L (ref 55–135)
ALT SERPL W/O P-5'-P-CCNC: 42 U/L (ref 10–44)
ANION GAP SERPL CALC-SCNC: 10 MMOL/L (ref 8–16)
ANION GAP SERPL CALC-SCNC: 11 MMOL/L (ref 8–16)
ANION GAP SERPL CALC-SCNC: 12 MMOL/L (ref 8–16)
ANION GAP SERPL CALC-SCNC: 12 MMOL/L (ref 8–16)
ANION GAP SERPL CALC-SCNC: 13 MMOL/L (ref 8–16)
ANION GAP SERPL CALC-SCNC: 14 MMOL/L (ref 8–16)
AST SERPL-CCNC: 26 U/L (ref 10–40)
BASOPHILS # BLD AUTO: 0.02 K/UL (ref 0–0.2)
BASOPHILS NFR BLD: 0.3 % (ref 0–1.9)
BILIRUB SERPL-MCNC: 0.3 MG/DL (ref 0.1–1)
BUN SERPL-MCNC: 25 MG/DL (ref 8–23)
BUN SERPL-MCNC: 27 MG/DL (ref 8–23)
BUN SERPL-MCNC: 33 MG/DL (ref 8–23)
BUN SERPL-MCNC: 36 MG/DL (ref 8–23)
BUN SERPL-MCNC: 36 MG/DL (ref 8–23)
BUN SERPL-MCNC: 43 MG/DL (ref 8–23)
CALCIUM SERPL-MCNC: 7.9 MG/DL (ref 8.7–10.5)
CALCIUM SERPL-MCNC: 7.9 MG/DL (ref 8.7–10.5)
CALCIUM SERPL-MCNC: 8 MG/DL (ref 8.7–10.5)
CALCIUM SERPL-MCNC: 8.2 MG/DL (ref 8.7–10.5)
CHLORIDE SERPL-SCNC: 107 MMOL/L (ref 95–110)
CHLORIDE SERPL-SCNC: 108 MMOL/L (ref 95–110)
CHLORIDE SERPL-SCNC: 109 MMOL/L (ref 95–110)
CHLORIDE SERPL-SCNC: 109 MMOL/L (ref 95–110)
CO2 SERPL-SCNC: 21 MMOL/L (ref 23–29)
CO2 SERPL-SCNC: 21 MMOL/L (ref 23–29)
CO2 SERPL-SCNC: 22 MMOL/L (ref 23–29)
CO2 SERPL-SCNC: 23 MMOL/L (ref 23–29)
CO2 SERPL-SCNC: 23 MMOL/L (ref 23–29)
CO2 SERPL-SCNC: 24 MMOL/L (ref 23–29)
CREAT SERPL-MCNC: 1.7 MG/DL (ref 0.5–1.4)
CREAT SERPL-MCNC: 1.7 MG/DL (ref 0.5–1.4)
CREAT SERPL-MCNC: 2 MG/DL (ref 0.5–1.4)
CREAT SERPL-MCNC: 2.1 MG/DL (ref 0.5–1.4)
CREAT SERPL-MCNC: 2.2 MG/DL (ref 0.5–1.4)
CREAT SERPL-MCNC: 2.4 MG/DL (ref 0.5–1.4)
DIFFERENTIAL METHOD: ABNORMAL
EOSINOPHIL # BLD AUTO: 0.1 K/UL (ref 0–0.5)
EOSINOPHIL NFR BLD: 1.3 % (ref 0–8)
ERYTHROCYTE [DISTWIDTH] IN BLOOD BY AUTOMATED COUNT: 12.4 % (ref 11.5–14.5)
EST. GFR  (AFRICAN AMERICAN): 32.4 ML/MIN/1.73 M^2
EST. GFR  (AFRICAN AMERICAN): 36 ML/MIN/1.73 M^2
EST. GFR  (AFRICAN AMERICAN): 38.1 ML/MIN/1.73 M^2
EST. GFR  (AFRICAN AMERICAN): 40.4 ML/MIN/1.73 M^2
EST. GFR  (AFRICAN AMERICAN): 49.2 ML/MIN/1.73 M^2
EST. GFR  (AFRICAN AMERICAN): 49.2 ML/MIN/1.73 M^2
EST. GFR  (NON AFRICAN AMERICAN): 28.1 ML/MIN/1.73 M^2
EST. GFR  (NON AFRICAN AMERICAN): 31.2 ML/MIN/1.73 M^2
EST. GFR  (NON AFRICAN AMERICAN): 33 ML/MIN/1.73 M^2
EST. GFR  (NON AFRICAN AMERICAN): 35 ML/MIN/1.73 M^2
EST. GFR  (NON AFRICAN AMERICAN): 42.6 ML/MIN/1.73 M^2
EST. GFR  (NON AFRICAN AMERICAN): 42.6 ML/MIN/1.73 M^2
GLUCOSE SERPL-MCNC: 113 MG/DL (ref 70–110)
GLUCOSE SERPL-MCNC: 115 MG/DL (ref 70–110)
GLUCOSE SERPL-MCNC: 126 MG/DL (ref 70–110)
GLUCOSE SERPL-MCNC: 137 MG/DL (ref 70–110)
GLUCOSE SERPL-MCNC: 141 MG/DL (ref 70–110)
GLUCOSE SERPL-MCNC: 166 MG/DL (ref 70–110)
HCT VFR BLD AUTO: 27.7 % (ref 40–54)
HGB BLD-MCNC: 9.4 G/DL (ref 14–18)
IMM GRANULOCYTES # BLD AUTO: 0.12 K/UL (ref 0–0.04)
IMM GRANULOCYTES NFR BLD AUTO: 1.7 % (ref 0–0.5)
LYMPHOCYTES # BLD AUTO: 0.5 K/UL (ref 1–4.8)
LYMPHOCYTES NFR BLD: 7.7 % (ref 18–48)
MAGNESIUM SERPL-MCNC: 1.1 MG/DL (ref 1.6–2.6)
MCH RBC QN AUTO: 31.1 PG (ref 27–31)
MCHC RBC AUTO-ENTMCNC: 33.9 G/DL (ref 32–36)
MCV RBC AUTO: 92 FL (ref 82–98)
MONOCYTES # BLD AUTO: 0.9 K/UL (ref 0.3–1)
MONOCYTES NFR BLD: 12.2 % (ref 4–15)
NEUTROPHILS # BLD AUTO: 5.4 K/UL (ref 1.8–7.7)
NEUTROPHILS NFR BLD: 76.8 % (ref 38–73)
NRBC BLD-RTO: 0 /100 WBC
PHOSPHATE SERPL-MCNC: 1.9 MG/DL (ref 2.7–4.5)
PHOSPHATE SERPL-MCNC: 2 MG/DL (ref 2.7–4.5)
PHOSPHATE SERPL-MCNC: 2.1 MG/DL (ref 2.7–4.5)
PHOSPHATE SERPL-MCNC: 2.2 MG/DL (ref 2.7–4.5)
PHOSPHATE SERPL-MCNC: 2.2 MG/DL (ref 2.7–4.5)
PHOSPHATE SERPL-MCNC: 2.3 MG/DL (ref 2.7–4.5)
PLATELET # BLD AUTO: 142 K/UL (ref 150–450)
PMV BLD AUTO: 9.6 FL (ref 9.2–12.9)
POCT GLUCOSE: 109 MG/DL (ref 70–110)
POCT GLUCOSE: 169 MG/DL (ref 70–110)
POCT GLUCOSE: 182 MG/DL (ref 70–110)
POCT GLUCOSE: 247 MG/DL (ref 70–110)
POTASSIUM SERPL-SCNC: 3.5 MMOL/L (ref 3.5–5.1)
POTASSIUM SERPL-SCNC: 3.5 MMOL/L (ref 3.5–5.1)
POTASSIUM SERPL-SCNC: 3.6 MMOL/L (ref 3.5–5.1)
POTASSIUM SERPL-SCNC: 3.7 MMOL/L (ref 3.5–5.1)
PROT SERPL-MCNC: 5.8 G/DL (ref 6–8.4)
RBC # BLD AUTO: 3.02 M/UL (ref 4.6–6.2)
SODIUM SERPL-SCNC: 141 MMOL/L (ref 136–145)
SODIUM SERPL-SCNC: 142 MMOL/L (ref 136–145)
SODIUM SERPL-SCNC: 142 MMOL/L (ref 136–145)
SODIUM SERPL-SCNC: 143 MMOL/L (ref 136–145)
SODIUM SERPL-SCNC: 143 MMOL/L (ref 136–145)
SODIUM SERPL-SCNC: 144 MMOL/L (ref 136–145)
WBC # BLD AUTO: 7.03 K/UL (ref 3.9–12.7)

## 2021-06-20 PROCEDURE — 80053 COMPREHEN METABOLIC PANEL: CPT | Performed by: NURSE PRACTITIONER

## 2021-06-20 PROCEDURE — 99232 SBSQ HOSP IP/OBS MODERATE 35: CPT | Mod: ,,, | Performed by: INTERNAL MEDICINE

## 2021-06-20 PROCEDURE — 80069 RENAL FUNCTION PANEL: CPT | Mod: 91 | Performed by: STUDENT IN AN ORGANIZED HEALTH CARE EDUCATION/TRAINING PROGRAM

## 2021-06-20 PROCEDURE — 99232 PR SUBSEQUENT HOSPITAL CARE,LEVL II: ICD-10-PCS | Mod: GC,,, | Performed by: PSYCHIATRY & NEUROLOGY

## 2021-06-20 PROCEDURE — 20600001 HC STEP DOWN PRIVATE ROOM

## 2021-06-20 PROCEDURE — 85025 COMPLETE CBC W/AUTO DIFF WBC: CPT | Performed by: NURSE PRACTITIONER

## 2021-06-20 PROCEDURE — 25000003 PHARM REV CODE 250: Performed by: STUDENT IN AN ORGANIZED HEALTH CARE EDUCATION/TRAINING PROGRAM

## 2021-06-20 PROCEDURE — 99232 SBSQ HOSP IP/OBS MODERATE 35: CPT | Mod: GC,,, | Performed by: PSYCHIATRY & NEUROLOGY

## 2021-06-20 PROCEDURE — 25000003 PHARM REV CODE 250: Performed by: HOSPITALIST

## 2021-06-20 PROCEDURE — 36415 COLL VENOUS BLD VENIPUNCTURE: CPT | Performed by: STUDENT IN AN ORGANIZED HEALTH CARE EDUCATION/TRAINING PROGRAM

## 2021-06-20 PROCEDURE — 84100 ASSAY OF PHOSPHORUS: CPT | Performed by: NURSE PRACTITIONER

## 2021-06-20 PROCEDURE — 99232 PR SUBSEQUENT HOSPITAL CARE,LEVL II: ICD-10-PCS | Mod: ,,, | Performed by: INTERNAL MEDICINE

## 2021-06-20 PROCEDURE — 83735 ASSAY OF MAGNESIUM: CPT | Performed by: NURSE PRACTITIONER

## 2021-06-20 PROCEDURE — 25000003 PHARM REV CODE 250: Performed by: INTERNAL MEDICINE

## 2021-06-20 RX ADMIN — PANTOPRAZOLE SODIUM 40 MG: 40 TABLET, DELAYED RELEASE ORAL at 04:06

## 2021-06-20 RX ADMIN — BENZTROPINE MESYLATE 0.5 MG: 0.5 TABLET ORAL at 08:06

## 2021-06-20 RX ADMIN — PANTOPRAZOLE SODIUM 40 MG: 40 TABLET, DELAYED RELEASE ORAL at 06:06

## 2021-06-20 RX ADMIN — INSULIN ASPART 2 UNITS: 100 INJECTION, SOLUTION INTRAVENOUS; SUBCUTANEOUS at 04:06

## 2021-06-20 RX ADMIN — MUPIROCIN: 20 OINTMENT TOPICAL at 08:06

## 2021-06-20 RX ADMIN — INSULIN DETEMIR 5 UNITS: 100 INJECTION, SOLUTION SUBCUTANEOUS at 08:06

## 2021-06-21 ENCOUNTER — ANESTHESIA EVENT (OUTPATIENT)
Dept: ENDOSCOPY | Facility: HOSPITAL | Age: 61
DRG: 871 | End: 2021-06-21
Payer: MEDICARE

## 2021-06-21 ENCOUNTER — ANESTHESIA (OUTPATIENT)
Dept: ENDOSCOPY | Facility: HOSPITAL | Age: 61
DRG: 871 | End: 2021-06-21
Payer: MEDICARE

## 2021-06-21 DIAGNOSIS — K26.9 DUODENAL ULCER: Primary | ICD-10-CM

## 2021-06-21 DIAGNOSIS — K22.10 EROSIVE ESOPHAGITIS: ICD-10-CM

## 2021-06-21 PROBLEM — A41.9 SEPSIS: Status: ACTIVE | Noted: 2021-06-21

## 2021-06-21 LAB
ALBUMIN SERPL BCP-MCNC: 2.6 G/DL (ref 3.5–5.2)
ALBUMIN SERPL BCP-MCNC: 2.7 G/DL (ref 3.5–5.2)
ALBUMIN SERPL BCP-MCNC: 2.8 G/DL (ref 3.5–5.2)
ALBUMIN SERPL BCP-MCNC: 2.8 G/DL (ref 3.5–5.2)
ALBUMIN SERPL BCP-MCNC: 2.9 G/DL (ref 3.5–5.2)
ALP SERPL-CCNC: 61 U/L (ref 55–135)
ALT SERPL W/O P-5'-P-CCNC: 36 U/L (ref 10–44)
ANION GAP SERPL CALC-SCNC: 10 MMOL/L (ref 8–16)
ANION GAP SERPL CALC-SCNC: 11 MMOL/L (ref 8–16)
ANION GAP SERPL CALC-SCNC: 12 MMOL/L (ref 8–16)
ANION GAP SERPL CALC-SCNC: 14 MMOL/L (ref 8–16)
AST SERPL-CCNC: 24 U/L (ref 10–40)
BACTERIA BLD CULT: NORMAL
BACTERIA BLD CULT: NORMAL
BASOPHILS # BLD AUTO: 0.01 K/UL (ref 0–0.2)
BASOPHILS NFR BLD: 0.2 % (ref 0–1.9)
BILIRUB SERPL-MCNC: 0.3 MG/DL (ref 0.1–1)
BUN SERPL-MCNC: 13 MG/DL (ref 8–23)
BUN SERPL-MCNC: 14 MG/DL (ref 8–23)
BUN SERPL-MCNC: 16 MG/DL (ref 8–23)
BUN SERPL-MCNC: 18 MG/DL (ref 8–23)
BUN SERPL-MCNC: 19 MG/DL (ref 8–23)
BUN SERPL-MCNC: 19 MG/DL (ref 8–23)
BUN SERPL-MCNC: 21 MG/DL (ref 8–23)
CALCIUM SERPL-MCNC: 7.7 MG/DL (ref 8.7–10.5)
CALCIUM SERPL-MCNC: 7.7 MG/DL (ref 8.7–10.5)
CALCIUM SERPL-MCNC: 7.9 MG/DL (ref 8.7–10.5)
CALCIUM SERPL-MCNC: 7.9 MG/DL (ref 8.7–10.5)
CALCIUM SERPL-MCNC: 8 MG/DL (ref 8.7–10.5)
CALCIUM SERPL-MCNC: 8 MG/DL (ref 8.7–10.5)
CALCIUM SERPL-MCNC: 8.1 MG/DL (ref 8.7–10.5)
CHLORIDE SERPL-SCNC: 106 MMOL/L (ref 95–110)
CHLORIDE SERPL-SCNC: 106 MMOL/L (ref 95–110)
CHLORIDE SERPL-SCNC: 107 MMOL/L (ref 95–110)
CHLORIDE SERPL-SCNC: 109 MMOL/L (ref 95–110)
CO2 SERPL-SCNC: 21 MMOL/L (ref 23–29)
CO2 SERPL-SCNC: 22 MMOL/L (ref 23–29)
CO2 SERPL-SCNC: 24 MMOL/L (ref 23–29)
CREAT SERPL-MCNC: 1.4 MG/DL (ref 0.5–1.4)
CREAT SERPL-MCNC: 1.4 MG/DL (ref 0.5–1.4)
CREAT SERPL-MCNC: 1.5 MG/DL (ref 0.5–1.4)
CREAT SERPL-MCNC: 1.6 MG/DL (ref 0.5–1.4)
DIFFERENTIAL METHOD: ABNORMAL
EOSINOPHIL # BLD AUTO: 0.1 K/UL (ref 0–0.5)
EOSINOPHIL NFR BLD: 2 % (ref 0–8)
ERYTHROCYTE [DISTWIDTH] IN BLOOD BY AUTOMATED COUNT: 12.5 % (ref 11.5–14.5)
EST. GFR  (AFRICAN AMERICAN): 53 ML/MIN/1.73 M^2
EST. GFR  (AFRICAN AMERICAN): 57.3 ML/MIN/1.73 M^2
EST. GFR  (AFRICAN AMERICAN): >60 ML/MIN/1.73 M^2
EST. GFR  (AFRICAN AMERICAN): >60 ML/MIN/1.73 M^2
EST. GFR  (NON AFRICAN AMERICAN): 45.8 ML/MIN/1.73 M^2
EST. GFR  (NON AFRICAN AMERICAN): 49.5 ML/MIN/1.73 M^2
EST. GFR  (NON AFRICAN AMERICAN): 53.8 ML/MIN/1.73 M^2
EST. GFR  (NON AFRICAN AMERICAN): 53.8 ML/MIN/1.73 M^2
GLUCOSE SERPL-MCNC: 107 MG/DL (ref 70–110)
GLUCOSE SERPL-MCNC: 110 MG/DL (ref 70–110)
GLUCOSE SERPL-MCNC: 117 MG/DL (ref 70–110)
GLUCOSE SERPL-MCNC: 123 MG/DL (ref 70–110)
GLUCOSE SERPL-MCNC: 130 MG/DL (ref 70–110)
GLUCOSE SERPL-MCNC: 148 MG/DL (ref 70–110)
GLUCOSE SERPL-MCNC: 165 MG/DL (ref 70–110)
HCT VFR BLD AUTO: 24.8 % (ref 40–54)
HGB BLD-MCNC: 8.2 G/DL (ref 14–18)
IMM GRANULOCYTES # BLD AUTO: 0.11 K/UL (ref 0–0.04)
IMM GRANULOCYTES NFR BLD AUTO: 1.8 % (ref 0–0.5)
LYMPHOCYTES # BLD AUTO: 0.7 K/UL (ref 1–4.8)
LYMPHOCYTES NFR BLD: 11.2 % (ref 18–48)
MAGNESIUM SERPL-MCNC: 1.1 MG/DL (ref 1.6–2.6)
MCH RBC QN AUTO: 30.5 PG (ref 27–31)
MCHC RBC AUTO-ENTMCNC: 33.1 G/DL (ref 32–36)
MCV RBC AUTO: 92 FL (ref 82–98)
MONOCYTES # BLD AUTO: 0.7 K/UL (ref 0.3–1)
MONOCYTES NFR BLD: 11.1 % (ref 4–15)
NEUTROPHILS # BLD AUTO: 4.4 K/UL (ref 1.8–7.7)
NEUTROPHILS NFR BLD: 73.7 % (ref 38–73)
NRBC BLD-RTO: 0 /100 WBC
PHOSPHATE SERPL-MCNC: 2.4 MG/DL (ref 2.7–4.5)
PHOSPHATE SERPL-MCNC: 2.6 MG/DL (ref 2.7–4.5)
PHOSPHATE SERPL-MCNC: 2.8 MG/DL (ref 2.7–4.5)
PHOSPHATE SERPL-MCNC: 3 MG/DL (ref 2.7–4.5)
PLATELET # BLD AUTO: 129 K/UL (ref 150–450)
PMV BLD AUTO: 9.5 FL (ref 9.2–12.9)
POCT GLUCOSE: 117 MG/DL (ref 70–110)
POCT GLUCOSE: 149 MG/DL (ref 70–110)
POCT GLUCOSE: 152 MG/DL (ref 70–110)
POCT GLUCOSE: 178 MG/DL (ref 70–110)
POTASSIUM SERPL-SCNC: 3.4 MMOL/L (ref 3.5–5.1)
POTASSIUM SERPL-SCNC: 3.5 MMOL/L (ref 3.5–5.1)
POTASSIUM SERPL-SCNC: 3.5 MMOL/L (ref 3.5–5.1)
POTASSIUM SERPL-SCNC: 3.6 MMOL/L (ref 3.5–5.1)
POTASSIUM SERPL-SCNC: 3.7 MMOL/L (ref 3.5–5.1)
PROT SERPL-MCNC: 5.8 G/DL (ref 6–8.4)
RBC # BLD AUTO: 2.69 M/UL (ref 4.6–6.2)
SARS-COV-2 RDRP RESP QL NAA+PROBE: NEGATIVE
SODIUM SERPL-SCNC: 140 MMOL/L (ref 136–145)
SODIUM SERPL-SCNC: 142 MMOL/L (ref 136–145)
SODIUM SERPL-SCNC: 143 MMOL/L (ref 136–145)
WBC # BLD AUTO: 5.97 K/UL (ref 3.9–12.7)

## 2021-06-21 PROCEDURE — 88341 PR IHC OR ICC EACH ADD'L SINGLE ANTIBODY  STAINPR: ICD-10-PCS | Mod: 26,,, | Performed by: PATHOLOGY

## 2021-06-21 PROCEDURE — 88305 TISSUE EXAM BY PATHOLOGIST: ICD-10-PCS | Mod: 26,,, | Performed by: PATHOLOGY

## 2021-06-21 PROCEDURE — 27201012 HC FORCEPS, HOT/COLD, DISP: Performed by: INTERNAL MEDICINE

## 2021-06-21 PROCEDURE — 99233 SBSQ HOSP IP/OBS HIGH 50: CPT | Mod: ,,, | Performed by: PSYCHIATRY & NEUROLOGY

## 2021-06-21 PROCEDURE — 25000003 PHARM REV CODE 250: Performed by: HOSPITALIST

## 2021-06-21 PROCEDURE — 88342 CHG IMMUNOCYTOCHEMISTRY: ICD-10-PCS | Mod: 26,,, | Performed by: PATHOLOGY

## 2021-06-21 PROCEDURE — 97535 SELF CARE MNGMENT TRAINING: CPT

## 2021-06-21 PROCEDURE — 85025 COMPLETE CBC W/AUTO DIFF WBC: CPT | Performed by: NURSE PRACTITIONER

## 2021-06-21 PROCEDURE — 88342 IMHCHEM/IMCYTCHM 1ST ANTB: CPT | Performed by: PATHOLOGY

## 2021-06-21 PROCEDURE — D9220A PRA ANESTHESIA: ICD-10-PCS | Mod: ANES,,, | Performed by: ANESTHESIOLOGY

## 2021-06-21 PROCEDURE — 80069 RENAL FUNCTION PANEL: CPT | Mod: 91 | Performed by: STUDENT IN AN ORGANIZED HEALTH CARE EDUCATION/TRAINING PROGRAM

## 2021-06-21 PROCEDURE — 84100 ASSAY OF PHOSPHORUS: CPT | Performed by: NURSE PRACTITIONER

## 2021-06-21 PROCEDURE — 88312 SPECIAL STAINS GROUP 1: CPT | Mod: 26,,, | Performed by: PATHOLOGY

## 2021-06-21 PROCEDURE — 99232 PR SUBSEQUENT HOSPITAL CARE,LEVL II: ICD-10-PCS | Mod: ,,, | Performed by: INTERNAL MEDICINE

## 2021-06-21 PROCEDURE — 63600175 PHARM REV CODE 636 W HCPCS: Performed by: NURSE ANESTHETIST, CERTIFIED REGISTERED

## 2021-06-21 PROCEDURE — 25000003 PHARM REV CODE 250: Performed by: NURSE ANESTHETIST, CERTIFIED REGISTERED

## 2021-06-21 PROCEDURE — 88312 SPECIAL STAINS GROUP 1: CPT | Performed by: PATHOLOGY

## 2021-06-21 PROCEDURE — 88341 IMHCHEM/IMCYTCHM EA ADD ANTB: CPT | Mod: 26,,, | Performed by: PATHOLOGY

## 2021-06-21 PROCEDURE — 88313 PR  SPECIAL STAINS,GROUP II: ICD-10-PCS | Mod: 26,,, | Performed by: PATHOLOGY

## 2021-06-21 PROCEDURE — 37000009 HC ANESTHESIA EA ADD 15 MINS: Performed by: INTERNAL MEDICINE

## 2021-06-21 PROCEDURE — 80069 RENAL FUNCTION PANEL: CPT | Performed by: STUDENT IN AN ORGANIZED HEALTH CARE EDUCATION/TRAINING PROGRAM

## 2021-06-21 PROCEDURE — 25000003 PHARM REV CODE 250: Performed by: STUDENT IN AN ORGANIZED HEALTH CARE EDUCATION/TRAINING PROGRAM

## 2021-06-21 PROCEDURE — 88312 PR  SPECIAL STAINS,GROUP I: ICD-10-PCS | Mod: 26,,, | Performed by: PATHOLOGY

## 2021-06-21 PROCEDURE — 99233 PR SUBSEQUENT HOSPITAL CARE,LEVL III: ICD-10-PCS | Mod: ,,, | Performed by: PSYCHIATRY & NEUROLOGY

## 2021-06-21 PROCEDURE — 99231 SBSQ HOSP IP/OBS SF/LOW 25: CPT | Mod: ,,, | Performed by: INTERNAL MEDICINE

## 2021-06-21 PROCEDURE — 37000008 HC ANESTHESIA 1ST 15 MINUTES: Performed by: INTERNAL MEDICINE

## 2021-06-21 PROCEDURE — 43239 EGD BIOPSY SINGLE/MULTIPLE: CPT | Mod: ,,, | Performed by: INTERNAL MEDICINE

## 2021-06-21 PROCEDURE — 36415 COLL VENOUS BLD VENIPUNCTURE: CPT | Performed by: STUDENT IN AN ORGANIZED HEALTH CARE EDUCATION/TRAINING PROGRAM

## 2021-06-21 PROCEDURE — 20600001 HC STEP DOWN PRIVATE ROOM

## 2021-06-21 PROCEDURE — 88313 SPECIAL STAINS GROUP 2: CPT | Performed by: PATHOLOGY

## 2021-06-21 PROCEDURE — 36415 COLL VENOUS BLD VENIPUNCTURE: CPT | Performed by: INTERNAL MEDICINE

## 2021-06-21 PROCEDURE — 88305 TISSUE EXAM BY PATHOLOGIST: CPT | Performed by: PATHOLOGY

## 2021-06-21 PROCEDURE — 80069 RENAL FUNCTION PANEL: CPT | Mod: 91 | Performed by: INTERNAL MEDICINE

## 2021-06-21 PROCEDURE — D9220A PRA ANESTHESIA: Mod: CRNA,,, | Performed by: NURSE ANESTHETIST, CERTIFIED REGISTERED

## 2021-06-21 PROCEDURE — 88342 IMHCHEM/IMCYTCHM 1ST ANTB: CPT | Mod: 26,,, | Performed by: PATHOLOGY

## 2021-06-21 PROCEDURE — 25000003 PHARM REV CODE 250: Performed by: PSYCHIATRY & NEUROLOGY

## 2021-06-21 PROCEDURE — 83735 ASSAY OF MAGNESIUM: CPT | Performed by: NURSE PRACTITIONER

## 2021-06-21 PROCEDURE — 88342 IMHCHEM/IMCYTCHM 1ST ANTB: CPT | Mod: 91 | Performed by: PATHOLOGY

## 2021-06-21 PROCEDURE — 80053 COMPREHEN METABOLIC PANEL: CPT | Performed by: NURSE PRACTITIONER

## 2021-06-21 PROCEDURE — U0002 COVID-19 LAB TEST NON-CDC: HCPCS | Performed by: INTERNAL MEDICINE

## 2021-06-21 PROCEDURE — 43239 PR EGD, FLEX, W/BIOPSY, SGL/MULTI: ICD-10-PCS | Mod: ,,, | Performed by: INTERNAL MEDICINE

## 2021-06-21 PROCEDURE — 88305 TISSUE EXAM BY PATHOLOGIST: CPT | Mod: 26,,, | Performed by: PATHOLOGY

## 2021-06-21 PROCEDURE — 88313 SPECIAL STAINS GROUP 2: CPT | Mod: 26,,, | Performed by: PATHOLOGY

## 2021-06-21 PROCEDURE — 99232 SBSQ HOSP IP/OBS MODERATE 35: CPT | Mod: ,,, | Performed by: INTERNAL MEDICINE

## 2021-06-21 PROCEDURE — D9220A PRA ANESTHESIA: Mod: ANES,,, | Performed by: ANESTHESIOLOGY

## 2021-06-21 PROCEDURE — 43239 EGD BIOPSY SINGLE/MULTIPLE: CPT | Performed by: INTERNAL MEDICINE

## 2021-06-21 PROCEDURE — 99231 PR SUBSEQUENT HOSPITAL CARE,LEVL I: ICD-10-PCS | Mod: ,,, | Performed by: INTERNAL MEDICINE

## 2021-06-21 PROCEDURE — 82962 GLUCOSE BLOOD TEST: CPT | Performed by: INTERNAL MEDICINE

## 2021-06-21 PROCEDURE — D9220A PRA ANESTHESIA: ICD-10-PCS | Mod: CRNA,,, | Performed by: NURSE ANESTHETIST, CERTIFIED REGISTERED

## 2021-06-21 RX ORDER — PROPOFOL 10 MG/ML
VIAL (ML) INTRAVENOUS
Status: DISCONTINUED | OUTPATIENT
Start: 2021-06-21 | End: 2021-06-21

## 2021-06-21 RX ORDER — RISPERIDONE 1 MG/1
1 TABLET ORAL 2 TIMES DAILY
Status: DISCONTINUED | OUTPATIENT
Start: 2021-06-21 | End: 2021-06-23

## 2021-06-21 RX ORDER — LIDOCAINE HYDROCHLORIDE 20 MG/ML
INJECTION INTRAVENOUS
Status: DISCONTINUED | OUTPATIENT
Start: 2021-06-21 | End: 2021-06-21

## 2021-06-21 RX ORDER — PANTOPRAZOLE SODIUM 40 MG/1
40 TABLET, DELAYED RELEASE ORAL EVERY 12 HOURS
Qty: 180 TABLET | Refills: 0 | Status: SHIPPED | OUTPATIENT
Start: 2021-06-21 | End: 2021-06-28 | Stop reason: SDUPTHER

## 2021-06-21 RX ORDER — SODIUM CHLORIDE 0.9 % (FLUSH) 0.9 %
10 SYRINGE (ML) INJECTION
Status: DISCONTINUED | OUTPATIENT
Start: 2021-06-21 | End: 2021-06-21 | Stop reason: HOSPADM

## 2021-06-21 RX ORDER — OXCARBAZEPINE 150 MG/1
150 TABLET, FILM COATED ORAL 2 TIMES DAILY
Status: DISCONTINUED | OUTPATIENT
Start: 2021-06-21 | End: 2021-06-23

## 2021-06-21 RX ORDER — PROPOFOL 10 MG/ML
VIAL (ML) INTRAVENOUS CONTINUOUS PRN
Status: DISCONTINUED | OUTPATIENT
Start: 2021-06-21 | End: 2021-06-21

## 2021-06-21 RX ADMIN — PANTOPRAZOLE SODIUM 40 MG: 40 TABLET, DELAYED RELEASE ORAL at 04:06

## 2021-06-21 RX ADMIN — GLYCOPYRROLATE 0.2 MG: 0.2 INJECTION, SOLUTION INTRAMUSCULAR; INTRAVITREAL at 11:06

## 2021-06-21 RX ADMIN — Medication 300 MCG/KG/MIN: at 11:06

## 2021-06-21 RX ADMIN — RISPERIDONE 1 MG: 1 TABLET ORAL at 09:06

## 2021-06-21 RX ADMIN — INSULIN DETEMIR 5 UNITS: 100 INJECTION, SOLUTION SUBCUTANEOUS at 08:06

## 2021-06-21 RX ADMIN — SODIUM CHLORIDE: 0.9 INJECTION, SOLUTION INTRAVENOUS at 11:06

## 2021-06-21 RX ADMIN — PROPOFOL 50 MG: 10 INJECTION, EMULSION INTRAVENOUS at 11:06

## 2021-06-21 RX ADMIN — OXCARBAZEPINE 150 MG: 150 TABLET, FILM COATED ORAL at 09:06

## 2021-06-21 RX ADMIN — BENZTROPINE MESYLATE 0.5 MG: 0.5 TABLET ORAL at 08:06

## 2021-06-21 RX ADMIN — BENZTROPINE MESYLATE 0.5 MG: 0.5 TABLET ORAL at 09:06

## 2021-06-21 RX ADMIN — LIDOCAINE HYDROCHLORIDE 50 MG: 20 INJECTION, SOLUTION INTRAVENOUS at 11:06

## 2021-06-21 RX ADMIN — PROPOFOL 30 MG: 10 INJECTION, EMULSION INTRAVENOUS at 11:06

## 2021-06-22 LAB
ALBUMIN SERPL BCP-MCNC: 2.5 G/DL (ref 3.5–5.2)
ALBUMIN SERPL BCP-MCNC: 2.6 G/DL (ref 3.5–5.2)
ALP SERPL-CCNC: 60 U/L (ref 55–135)
ALT SERPL W/O P-5'-P-CCNC: 31 U/L (ref 10–44)
ANION GAP SERPL CALC-SCNC: 11 MMOL/L (ref 8–16)
ANION GAP SERPL CALC-SCNC: 12 MMOL/L (ref 8–16)
ANION GAP SERPL CALC-SCNC: 14 MMOL/L (ref 8–16)
AST SERPL-CCNC: 21 U/L (ref 10–40)
BASOPHILS # BLD AUTO: 0.01 K/UL (ref 0–0.2)
BASOPHILS NFR BLD: 0.2 % (ref 0–1.9)
BILIRUB SERPL-MCNC: 0.4 MG/DL (ref 0.1–1)
BUN SERPL-MCNC: 11 MG/DL (ref 8–23)
BUN SERPL-MCNC: 12 MG/DL (ref 8–23)
BUN SERPL-MCNC: 13 MG/DL (ref 8–23)
CALCIUM SERPL-MCNC: 7.4 MG/DL (ref 8.7–10.5)
CALCIUM SERPL-MCNC: 7.5 MG/DL (ref 8.7–10.5)
CALCIUM SERPL-MCNC: 7.5 MG/DL (ref 8.7–10.5)
CALCIUM SERPL-MCNC: 7.6 MG/DL (ref 8.7–10.5)
CALCIUM SERPL-MCNC: 7.7 MG/DL (ref 8.7–10.5)
CHLORIDE SERPL-SCNC: 106 MMOL/L (ref 95–110)
CHLORIDE SERPL-SCNC: 107 MMOL/L (ref 95–110)
CHLORIDE SERPL-SCNC: 108 MMOL/L (ref 95–110)
CHLORIDE SERPL-SCNC: 108 MMOL/L (ref 95–110)
CHLORIDE SERPL-SCNC: 109 MMOL/L (ref 95–110)
CO2 SERPL-SCNC: 21 MMOL/L (ref 23–29)
CO2 SERPL-SCNC: 21 MMOL/L (ref 23–29)
CO2 SERPL-SCNC: 22 MMOL/L (ref 23–29)
CO2 SERPL-SCNC: 24 MMOL/L (ref 23–29)
CO2 SERPL-SCNC: 24 MMOL/L (ref 23–29)
CREAT SERPL-MCNC: 1.3 MG/DL (ref 0.5–1.4)
CREAT SERPL-MCNC: 1.4 MG/DL (ref 0.5–1.4)
CREAT SERPL-MCNC: 1.5 MG/DL (ref 0.5–1.4)
DIFFERENTIAL METHOD: ABNORMAL
EOSINOPHIL # BLD AUTO: 0.1 K/UL (ref 0–0.5)
EOSINOPHIL NFR BLD: 2.4 % (ref 0–8)
ERYTHROCYTE [DISTWIDTH] IN BLOOD BY AUTOMATED COUNT: 12.5 % (ref 11.5–14.5)
EST. GFR  (AFRICAN AMERICAN): 57.3 ML/MIN/1.73 M^2
EST. GFR  (AFRICAN AMERICAN): >60 ML/MIN/1.73 M^2
EST. GFR  (NON AFRICAN AMERICAN): 49.5 ML/MIN/1.73 M^2
EST. GFR  (NON AFRICAN AMERICAN): 53.8 ML/MIN/1.73 M^2
EST. GFR  (NON AFRICAN AMERICAN): 58.9 ML/MIN/1.73 M^2
GLUCOSE SERPL-MCNC: 124 MG/DL (ref 70–110)
GLUCOSE SERPL-MCNC: 126 MG/DL (ref 70–110)
GLUCOSE SERPL-MCNC: 139 MG/DL (ref 70–110)
GLUCOSE SERPL-MCNC: 153 MG/DL (ref 70–110)
GLUCOSE SERPL-MCNC: 154 MG/DL (ref 70–110)
GLUCOSE SERPL-MCNC: 175 MG/DL (ref 70–110)
GLUCOSE SERPL-MCNC: 177 MG/DL (ref 70–110)
HCT VFR BLD AUTO: 25.1 % (ref 40–54)
HGB BLD-MCNC: 8.1 G/DL (ref 14–18)
IMM GRANULOCYTES # BLD AUTO: 0.09 K/UL (ref 0–0.04)
IMM GRANULOCYTES NFR BLD AUTO: 1.7 % (ref 0–0.5)
LYMPHOCYTES # BLD AUTO: 0.7 K/UL (ref 1–4.8)
LYMPHOCYTES NFR BLD: 13.3 % (ref 18–48)
MAGNESIUM SERPL-MCNC: 1 MG/DL (ref 1.6–2.6)
MCH RBC QN AUTO: 30.2 PG (ref 27–31)
MCHC RBC AUTO-ENTMCNC: 32.3 G/DL (ref 32–36)
MCV RBC AUTO: 94 FL (ref 82–98)
MONOCYTES # BLD AUTO: 0.6 K/UL (ref 0.3–1)
MONOCYTES NFR BLD: 10.9 % (ref 4–15)
NEUTROPHILS # BLD AUTO: 3.9 K/UL (ref 1.8–7.7)
NEUTROPHILS NFR BLD: 71.5 % (ref 38–73)
NRBC BLD-RTO: 0 /100 WBC
PHOSPHATE SERPL-MCNC: 2.9 MG/DL (ref 2.7–4.5)
PHOSPHATE SERPL-MCNC: 3 MG/DL (ref 2.7–4.5)
PHOSPHATE SERPL-MCNC: 3.1 MG/DL (ref 2.7–4.5)
PHOSPHATE SERPL-MCNC: 3.2 MG/DL (ref 2.7–4.5)
PHOSPHATE SERPL-MCNC: 3.5 MG/DL (ref 2.7–4.5)
PHOSPHATIDYLETHANOL (PETH): NEGATIVE NG/ML
PLATELET # BLD AUTO: 149 K/UL (ref 150–450)
PMV BLD AUTO: 9.3 FL (ref 9.2–12.9)
POCT GLUCOSE: 129 MG/DL (ref 70–110)
POCT GLUCOSE: 134 MG/DL (ref 70–110)
POCT GLUCOSE: 184 MG/DL (ref 70–110)
POTASSIUM SERPL-SCNC: 3.5 MMOL/L (ref 3.5–5.1)
POTASSIUM SERPL-SCNC: 3.7 MMOL/L (ref 3.5–5.1)
POTASSIUM SERPL-SCNC: 3.8 MMOL/L (ref 3.5–5.1)
PROT SERPL-MCNC: 5.7 G/DL (ref 6–8.4)
RBC # BLD AUTO: 2.68 M/UL (ref 4.6–6.2)
SODIUM SERPL-SCNC: 140 MMOL/L (ref 136–145)
SODIUM SERPL-SCNC: 141 MMOL/L (ref 136–145)
SODIUM SERPL-SCNC: 141 MMOL/L (ref 136–145)
SODIUM SERPL-SCNC: 142 MMOL/L (ref 136–145)
SODIUM SERPL-SCNC: 143 MMOL/L (ref 136–145)
WBC # BLD AUTO: 5.4 K/UL (ref 3.9–12.7)

## 2021-06-22 PROCEDURE — 25000003 PHARM REV CODE 250: Performed by: HOSPITALIST

## 2021-06-22 PROCEDURE — 99232 PR SUBSEQUENT HOSPITAL CARE,LEVL II: ICD-10-PCS | Mod: ,,, | Performed by: INTERNAL MEDICINE

## 2021-06-22 PROCEDURE — 80053 COMPREHEN METABOLIC PANEL: CPT | Performed by: NURSE PRACTITIONER

## 2021-06-22 PROCEDURE — 85025 COMPLETE CBC W/AUTO DIFF WBC: CPT | Performed by: NURSE PRACTITIONER

## 2021-06-22 PROCEDURE — 99233 SBSQ HOSP IP/OBS HIGH 50: CPT | Mod: ,,, | Performed by: PSYCHIATRY & NEUROLOGY

## 2021-06-22 PROCEDURE — 84100 ASSAY OF PHOSPHORUS: CPT | Performed by: NURSE PRACTITIONER

## 2021-06-22 PROCEDURE — 83735 ASSAY OF MAGNESIUM: CPT | Performed by: NURSE PRACTITIONER

## 2021-06-22 PROCEDURE — 99233 PR SUBSEQUENT HOSPITAL CARE,LEVL III: ICD-10-PCS | Mod: ,,, | Performed by: PSYCHIATRY & NEUROLOGY

## 2021-06-22 PROCEDURE — 20600001 HC STEP DOWN PRIVATE ROOM

## 2021-06-22 PROCEDURE — 97530 THERAPEUTIC ACTIVITIES: CPT

## 2021-06-22 PROCEDURE — 80069 RENAL FUNCTION PANEL: CPT | Mod: 91 | Performed by: INTERNAL MEDICINE

## 2021-06-22 PROCEDURE — 25000003 PHARM REV CODE 250: Performed by: STUDENT IN AN ORGANIZED HEALTH CARE EDUCATION/TRAINING PROGRAM

## 2021-06-22 PROCEDURE — 25000003 PHARM REV CODE 250: Performed by: PSYCHIATRY & NEUROLOGY

## 2021-06-22 PROCEDURE — 99232 SBSQ HOSP IP/OBS MODERATE 35: CPT | Mod: ,,, | Performed by: INTERNAL MEDICINE

## 2021-06-22 PROCEDURE — 36415 COLL VENOUS BLD VENIPUNCTURE: CPT | Performed by: INTERNAL MEDICINE

## 2021-06-22 RX ADMIN — BENZTROPINE MESYLATE 0.5 MG: 0.5 TABLET ORAL at 09:06

## 2021-06-22 RX ADMIN — OXCARBAZEPINE 150 MG: 150 TABLET, FILM COATED ORAL at 09:06

## 2021-06-22 RX ADMIN — PANTOPRAZOLE SODIUM 40 MG: 40 TABLET, DELAYED RELEASE ORAL at 05:06

## 2021-06-22 RX ADMIN — PANTOPRAZOLE SODIUM 40 MG: 40 TABLET, DELAYED RELEASE ORAL at 06:06

## 2021-06-22 RX ADMIN — INSULIN DETEMIR 5 UNITS: 100 INJECTION, SOLUTION SUBCUTANEOUS at 09:06

## 2021-06-22 RX ADMIN — RISPERIDONE 1 MG: 1 TABLET ORAL at 09:06

## 2021-06-23 PROBLEM — A41.9 SEPSIS: Status: RESOLVED | Noted: 2021-06-21 | Resolved: 2021-06-23

## 2021-06-23 PROBLEM — E87.20 METABOLIC ACIDOSIS: Status: RESOLVED | Noted: 2021-06-16 | Resolved: 2021-06-23

## 2021-06-23 LAB
ALBUMIN SERPL BCP-MCNC: 2.4 G/DL (ref 3.5–5.2)
ALBUMIN SERPL BCP-MCNC: 2.8 G/DL (ref 3.5–5.2)
ALBUMIN SERPL BCP-MCNC: 2.8 G/DL (ref 3.5–5.2)
ALBUMIN SERPL BCP-MCNC: 2.9 G/DL (ref 3.5–5.2)
ALBUMIN SERPL BCP-MCNC: 3 G/DL (ref 3.5–5.2)
ALP SERPL-CCNC: 75 U/L (ref 55–135)
ALT SERPL W/O P-5'-P-CCNC: 35 U/L (ref 10–44)
ANION GAP SERPL CALC-SCNC: 10 MMOL/L (ref 8–16)
ANION GAP SERPL CALC-SCNC: 10 MMOL/L (ref 8–16)
ANION GAP SERPL CALC-SCNC: 11 MMOL/L (ref 8–16)
ANION GAP SERPL CALC-SCNC: 11 MMOL/L (ref 8–16)
ANION GAP SERPL CALC-SCNC: 12 MMOL/L (ref 8–16)
ANION GAP SERPL CALC-SCNC: 12 MMOL/L (ref 8–16)
ANION GAP SERPL CALC-SCNC: 14 MMOL/L (ref 8–16)
AST SERPL-CCNC: 25 U/L (ref 10–40)
BASOPHILS # BLD AUTO: 0.02 K/UL (ref 0–0.2)
BASOPHILS NFR BLD: 0.4 % (ref 0–1.9)
BILIRUB SERPL-MCNC: 0.3 MG/DL (ref 0.1–1)
BUN SERPL-MCNC: 10 MG/DL (ref 8–23)
BUN SERPL-MCNC: 10 MG/DL (ref 8–23)
BUN SERPL-MCNC: 11 MG/DL (ref 8–23)
BUN SERPL-MCNC: 9 MG/DL (ref 8–23)
BUN SERPL-MCNC: 9 MG/DL (ref 8–23)
CALCIUM SERPL-MCNC: 7.3 MG/DL (ref 8.7–10.5)
CALCIUM SERPL-MCNC: 7.6 MG/DL (ref 8.7–10.5)
CALCIUM SERPL-MCNC: 7.7 MG/DL (ref 8.7–10.5)
CALCIUM SERPL-MCNC: 7.7 MG/DL (ref 8.7–10.5)
CHLORIDE SERPL-SCNC: 105 MMOL/L (ref 95–110)
CHLORIDE SERPL-SCNC: 106 MMOL/L (ref 95–110)
CHLORIDE SERPL-SCNC: 106 MMOL/L (ref 95–110)
CHLORIDE SERPL-SCNC: 107 MMOL/L (ref 95–110)
CHLORIDE SERPL-SCNC: 108 MMOL/L (ref 95–110)
CO2 SERPL-SCNC: 22 MMOL/L (ref 23–29)
CO2 SERPL-SCNC: 23 MMOL/L (ref 23–29)
CO2 SERPL-SCNC: 25 MMOL/L (ref 23–29)
CO2 SERPL-SCNC: 26 MMOL/L (ref 23–29)
CO2 SERPL-SCNC: 27 MMOL/L (ref 23–29)
CREAT SERPL-MCNC: 1.3 MG/DL (ref 0.5–1.4)
CREAT SERPL-MCNC: 1.4 MG/DL (ref 0.5–1.4)
DIFFERENTIAL METHOD: ABNORMAL
EOSINOPHIL # BLD AUTO: 0.1 K/UL (ref 0–0.5)
EOSINOPHIL NFR BLD: 2.1 % (ref 0–8)
ERYTHROCYTE [DISTWIDTH] IN BLOOD BY AUTOMATED COUNT: 12.6 % (ref 11.5–14.5)
EST. GFR  (AFRICAN AMERICAN): >60 ML/MIN/1.73 M^2
EST. GFR  (NON AFRICAN AMERICAN): 53.8 ML/MIN/1.73 M^2
EST. GFR  (NON AFRICAN AMERICAN): 58.9 ML/MIN/1.73 M^2
GLUCOSE SERPL-MCNC: 121 MG/DL (ref 70–110)
GLUCOSE SERPL-MCNC: 122 MG/DL (ref 70–110)
GLUCOSE SERPL-MCNC: 123 MG/DL (ref 70–110)
GLUCOSE SERPL-MCNC: 130 MG/DL (ref 70–110)
GLUCOSE SERPL-MCNC: 140 MG/DL (ref 70–110)
GLUCOSE SERPL-MCNC: 144 MG/DL (ref 70–110)
GLUCOSE SERPL-MCNC: 203 MG/DL (ref 70–110)
HCT VFR BLD AUTO: 25.4 % (ref 40–54)
HGB BLD-MCNC: 8 G/DL (ref 14–18)
IMM GRANULOCYTES # BLD AUTO: 0.07 K/UL (ref 0–0.04)
IMM GRANULOCYTES NFR BLD AUTO: 1.5 % (ref 0–0.5)
LYMPHOCYTES # BLD AUTO: 0.8 K/UL (ref 1–4.8)
LYMPHOCYTES NFR BLD: 16.2 % (ref 18–48)
MAGNESIUM SERPL-MCNC: 1.1 MG/DL (ref 1.6–2.6)
MCH RBC QN AUTO: 29.7 PG (ref 27–31)
MCHC RBC AUTO-ENTMCNC: 31.5 G/DL (ref 32–36)
MCV RBC AUTO: 94 FL (ref 82–98)
MONOCYTES # BLD AUTO: 0.5 K/UL (ref 0.3–1)
MONOCYTES NFR BLD: 11.3 % (ref 4–15)
NEUTROPHILS # BLD AUTO: 3.3 K/UL (ref 1.8–7.7)
NEUTROPHILS NFR BLD: 68.5 % (ref 38–73)
NRBC BLD-RTO: 0 /100 WBC
PHOSPHATE SERPL-MCNC: 2.9 MG/DL (ref 2.7–4.5)
PHOSPHATE SERPL-MCNC: 3.1 MG/DL (ref 2.7–4.5)
PHOSPHATE SERPL-MCNC: 3.2 MG/DL (ref 2.7–4.5)
PHOSPHATE SERPL-MCNC: 3.2 MG/DL (ref 2.7–4.5)
PHOSPHATE SERPL-MCNC: 3.5 MG/DL (ref 2.7–4.5)
PHOSPHATE SERPL-MCNC: 3.6 MG/DL (ref 2.7–4.5)
PHOSPHATE SERPL-MCNC: 3.7 MG/DL (ref 2.7–4.5)
PLATELET # BLD AUTO: 151 K/UL (ref 150–450)
PMV BLD AUTO: 9.8 FL (ref 9.2–12.9)
POCT GLUCOSE: 120 MG/DL (ref 70–110)
POCT GLUCOSE: 134 MG/DL (ref 70–110)
POCT GLUCOSE: 165 MG/DL (ref 70–110)
POTASSIUM SERPL-SCNC: 3.4 MMOL/L (ref 3.5–5.1)
POTASSIUM SERPL-SCNC: 3.5 MMOL/L (ref 3.5–5.1)
POTASSIUM SERPL-SCNC: 3.7 MMOL/L (ref 3.5–5.1)
PROT SERPL-MCNC: 6.3 G/DL (ref 6–8.4)
RBC # BLD AUTO: 2.69 M/UL (ref 4.6–6.2)
SODIUM SERPL-SCNC: 141 MMOL/L (ref 136–145)
SODIUM SERPL-SCNC: 142 MMOL/L (ref 136–145)
SODIUM SERPL-SCNC: 144 MMOL/L (ref 136–145)
SODIUM SERPL-SCNC: 144 MMOL/L (ref 136–145)
WBC # BLD AUTO: 4.76 K/UL (ref 3.9–12.7)

## 2021-06-23 PROCEDURE — 80069 RENAL FUNCTION PANEL: CPT | Mod: 91 | Performed by: INTERNAL MEDICINE

## 2021-06-23 PROCEDURE — 25000003 PHARM REV CODE 250: Performed by: HOSPITALIST

## 2021-06-23 PROCEDURE — 85025 COMPLETE CBC W/AUTO DIFF WBC: CPT | Performed by: NURSE PRACTITIONER

## 2021-06-23 PROCEDURE — 83735 ASSAY OF MAGNESIUM: CPT | Performed by: NURSE PRACTITIONER

## 2021-06-23 PROCEDURE — 99232 PR SUBSEQUENT HOSPITAL CARE,LEVL II: ICD-10-PCS | Mod: ,,, | Performed by: INTERNAL MEDICINE

## 2021-06-23 PROCEDURE — 80069 RENAL FUNCTION PANEL: CPT | Performed by: INTERNAL MEDICINE

## 2021-06-23 PROCEDURE — 20600001 HC STEP DOWN PRIVATE ROOM

## 2021-06-23 PROCEDURE — 25000003 PHARM REV CODE 250: Performed by: PSYCHIATRY & NEUROLOGY

## 2021-06-23 PROCEDURE — 97535 SELF CARE MNGMENT TRAINING: CPT

## 2021-06-23 PROCEDURE — 80053 COMPREHEN METABOLIC PANEL: CPT | Performed by: NURSE PRACTITIONER

## 2021-06-23 PROCEDURE — 99232 SBSQ HOSP IP/OBS MODERATE 35: CPT | Mod: ,,, | Performed by: INTERNAL MEDICINE

## 2021-06-23 PROCEDURE — 84100 ASSAY OF PHOSPHORUS: CPT | Performed by: NURSE PRACTITIONER

## 2021-06-23 PROCEDURE — 36415 COLL VENOUS BLD VENIPUNCTURE: CPT | Performed by: INTERNAL MEDICINE

## 2021-06-23 RX ORDER — BENZTROPINE MESYLATE 0.5 MG/1
1 TABLET ORAL 2 TIMES DAILY
Status: DISCONTINUED | OUTPATIENT
Start: 2021-06-23 | End: 2021-06-28 | Stop reason: HOSPADM

## 2021-06-23 RX ORDER — OXCARBAZEPINE 150 MG/1
300 TABLET, FILM COATED ORAL 2 TIMES DAILY
Status: DISCONTINUED | OUTPATIENT
Start: 2021-06-23 | End: 2021-06-28 | Stop reason: HOSPADM

## 2021-06-23 RX ORDER — RISPERIDONE 1 MG/1
2 TABLET ORAL 2 TIMES DAILY
Status: DISCONTINUED | OUTPATIENT
Start: 2021-06-23 | End: 2021-06-28 | Stop reason: HOSPADM

## 2021-06-23 RX ADMIN — PANTOPRAZOLE SODIUM 40 MG: 40 TABLET, DELAYED RELEASE ORAL at 06:06

## 2021-06-23 RX ADMIN — OXCARBAZEPINE 300 MG: 150 TABLET, FILM COATED ORAL at 08:06

## 2021-06-23 RX ADMIN — BENZTROPINE MESYLATE 1 MG: 0.5 TABLET ORAL at 08:06

## 2021-06-23 RX ADMIN — RISPERIDONE 2 MG: 1 TABLET ORAL at 08:06

## 2021-06-23 RX ADMIN — PANTOPRAZOLE SODIUM 40 MG: 40 TABLET, DELAYED RELEASE ORAL at 05:06

## 2021-06-23 RX ADMIN — INSULIN DETEMIR 5 UNITS: 100 INJECTION, SOLUTION SUBCUTANEOUS at 08:06

## 2021-06-24 PROBLEM — K26.9 DUODENAL ULCER DISEASE: Status: ACTIVE | Noted: 2021-06-18

## 2021-06-24 PROBLEM — N18.31 STAGE 3A CHRONIC KIDNEY DISEASE: Chronic | Status: ACTIVE | Noted: 2021-06-17

## 2021-06-24 PROBLEM — G93.41 ACUTE METABOLIC ENCEPHALOPATHY: Status: RESOLVED | Noted: 2021-06-16 | Resolved: 2021-06-24

## 2021-06-24 LAB
ALBUMIN SERPL BCP-MCNC: 2.8 G/DL (ref 3.5–5.2)
ALBUMIN SERPL BCP-MCNC: 2.9 G/DL (ref 3.5–5.2)
ALBUMIN SERPL BCP-MCNC: 2.9 G/DL (ref 3.5–5.2)
ALP SERPL-CCNC: 69 U/L (ref 55–135)
ALT SERPL W/O P-5'-P-CCNC: 29 U/L (ref 10–44)
ANION GAP SERPL CALC-SCNC: 12 MMOL/L (ref 8–16)
ANION GAP SERPL CALC-SCNC: 12 MMOL/L (ref 8–16)
ANION GAP SERPL CALC-SCNC: 13 MMOL/L (ref 8–16)
AST SERPL-CCNC: 20 U/L (ref 10–40)
BASOPHILS # BLD AUTO: 0.01 K/UL (ref 0–0.2)
BASOPHILS NFR BLD: 0.2 % (ref 0–1.9)
BILIRUB SERPL-MCNC: 0.3 MG/DL (ref 0.1–1)
BUN SERPL-MCNC: 10 MG/DL (ref 8–23)
BUN SERPL-MCNC: 11 MG/DL (ref 8–23)
BUN SERPL-MCNC: 11 MG/DL (ref 8–23)
CALCIUM SERPL-MCNC: 7.5 MG/DL (ref 8.7–10.5)
CALCIUM SERPL-MCNC: 7.6 MG/DL (ref 8.7–10.5)
CALCIUM SERPL-MCNC: 7.6 MG/DL (ref 8.7–10.5)
CHLORIDE SERPL-SCNC: 104 MMOL/L (ref 95–110)
CHLORIDE SERPL-SCNC: 105 MMOL/L (ref 95–110)
CHLORIDE SERPL-SCNC: 106 MMOL/L (ref 95–110)
CO2 SERPL-SCNC: 22 MMOL/L (ref 23–29)
CO2 SERPL-SCNC: 25 MMOL/L (ref 23–29)
CO2 SERPL-SCNC: 27 MMOL/L (ref 23–29)
CREAT SERPL-MCNC: 1.3 MG/DL (ref 0.5–1.4)
DIFFERENTIAL METHOD: ABNORMAL
EOSINOPHIL # BLD AUTO: 0.1 K/UL (ref 0–0.5)
EOSINOPHIL NFR BLD: 1.7 % (ref 0–8)
ERYTHROCYTE [DISTWIDTH] IN BLOOD BY AUTOMATED COUNT: 12.8 % (ref 11.5–14.5)
EST. GFR  (AFRICAN AMERICAN): >60 ML/MIN/1.73 M^2
EST. GFR  (NON AFRICAN AMERICAN): 58.9 ML/MIN/1.73 M^2
GLUCOSE SERPL-MCNC: 122 MG/DL (ref 70–110)
GLUCOSE SERPL-MCNC: 141 MG/DL (ref 70–110)
GLUCOSE SERPL-MCNC: 141 MG/DL (ref 70–110)
HCT VFR BLD AUTO: 24.6 % (ref 40–54)
HGB BLD-MCNC: 7.6 G/DL (ref 14–18)
IMM GRANULOCYTES # BLD AUTO: 0.04 K/UL (ref 0–0.04)
IMM GRANULOCYTES NFR BLD AUTO: 0.8 % (ref 0–0.5)
LYMPHOCYTES # BLD AUTO: 0.7 K/UL (ref 1–4.8)
LYMPHOCYTES NFR BLD: 15 % (ref 18–48)
MAGNESIUM SERPL-MCNC: 1.1 MG/DL (ref 1.6–2.6)
MCH RBC QN AUTO: 29.8 PG (ref 27–31)
MCHC RBC AUTO-ENTMCNC: 30.9 G/DL (ref 32–36)
MCV RBC AUTO: 97 FL (ref 82–98)
MONOCYTES # BLD AUTO: 0.5 K/UL (ref 0.3–1)
MONOCYTES NFR BLD: 10.8 % (ref 4–15)
NEUTROPHILS # BLD AUTO: 3.4 K/UL (ref 1.8–7.7)
NEUTROPHILS NFR BLD: 71.5 % (ref 38–73)
NRBC BLD-RTO: 0 /100 WBC
PHOSPHATE SERPL-MCNC: 3.9 MG/DL (ref 2.7–4.5)
PHOSPHATE SERPL-MCNC: 4 MG/DL (ref 2.7–4.5)
PHOSPHATE SERPL-MCNC: 4.3 MG/DL (ref 2.7–4.5)
PLATELET # BLD AUTO: 158 K/UL (ref 150–450)
PMV BLD AUTO: 9.7 FL (ref 9.2–12.9)
POCT GLUCOSE: 138 MG/DL (ref 70–110)
POCT GLUCOSE: 181 MG/DL (ref 70–110)
POCT GLUCOSE: 184 MG/DL (ref 70–110)
POCT GLUCOSE: 237 MG/DL (ref 70–110)
POTASSIUM SERPL-SCNC: 3.7 MMOL/L (ref 3.5–5.1)
PROT SERPL-MCNC: 6.3 G/DL (ref 6–8.4)
RBC # BLD AUTO: 2.55 M/UL (ref 4.6–6.2)
SODIUM SERPL-SCNC: 141 MMOL/L (ref 136–145)
SODIUM SERPL-SCNC: 142 MMOL/L (ref 136–145)
SODIUM SERPL-SCNC: 143 MMOL/L (ref 136–145)
WBC # BLD AUTO: 4.81 K/UL (ref 3.9–12.7)

## 2021-06-24 PROCEDURE — 97116 GAIT TRAINING THERAPY: CPT

## 2021-06-24 PROCEDURE — 85025 COMPLETE CBC W/AUTO DIFF WBC: CPT | Performed by: NURSE PRACTITIONER

## 2021-06-24 PROCEDURE — 84100 ASSAY OF PHOSPHORUS: CPT | Performed by: NURSE PRACTITIONER

## 2021-06-24 PROCEDURE — 99232 SBSQ HOSP IP/OBS MODERATE 35: CPT | Mod: ,,, | Performed by: HOSPITALIST

## 2021-06-24 PROCEDURE — 80069 RENAL FUNCTION PANEL: CPT | Performed by: INTERNAL MEDICINE

## 2021-06-24 PROCEDURE — 83735 ASSAY OF MAGNESIUM: CPT | Performed by: NURSE PRACTITIONER

## 2021-06-24 PROCEDURE — 25000003 PHARM REV CODE 250: Performed by: PSYCHIATRY & NEUROLOGY

## 2021-06-24 PROCEDURE — 80069 RENAL FUNCTION PANEL: CPT | Mod: 91 | Performed by: INTERNAL MEDICINE

## 2021-06-24 PROCEDURE — 99232 PR SUBSEQUENT HOSPITAL CARE,LEVL II: ICD-10-PCS | Mod: ,,, | Performed by: HOSPITALIST

## 2021-06-24 PROCEDURE — 80053 COMPREHEN METABOLIC PANEL: CPT | Performed by: NURSE PRACTITIONER

## 2021-06-24 PROCEDURE — 36415 COLL VENOUS BLD VENIPUNCTURE: CPT | Performed by: INTERNAL MEDICINE

## 2021-06-24 PROCEDURE — 20600001 HC STEP DOWN PRIVATE ROOM

## 2021-06-24 PROCEDURE — 25000003 PHARM REV CODE 250: Performed by: HOSPITALIST

## 2021-06-24 RX ADMIN — PANTOPRAZOLE SODIUM 40 MG: 40 TABLET, DELAYED RELEASE ORAL at 05:06

## 2021-06-24 RX ADMIN — OXCARBAZEPINE 300 MG: 150 TABLET, FILM COATED ORAL at 09:06

## 2021-06-24 RX ADMIN — PANTOPRAZOLE SODIUM 40 MG: 40 TABLET, DELAYED RELEASE ORAL at 03:06

## 2021-06-24 RX ADMIN — RISPERIDONE 2 MG: 1 TABLET ORAL at 09:06

## 2021-06-24 RX ADMIN — BENZTROPINE MESYLATE 1 MG: 0.5 TABLET ORAL at 10:06

## 2021-06-24 RX ADMIN — INSULIN ASPART 5 UNITS: 100 INJECTION, SOLUTION INTRAVENOUS; SUBCUTANEOUS at 09:06

## 2021-06-24 RX ADMIN — INSULIN ASPART 2 UNITS: 100 INJECTION, SOLUTION INTRAVENOUS; SUBCUTANEOUS at 07:06

## 2021-06-24 RX ADMIN — INSULIN DETEMIR 5 UNITS: 100 INJECTION, SOLUTION SUBCUTANEOUS at 09:06

## 2021-06-24 RX ADMIN — BENZTROPINE MESYLATE 1 MG: 0.5 TABLET ORAL at 09:06

## 2021-06-24 RX ADMIN — OXCARBAZEPINE 300 MG: 150 TABLET, FILM COATED ORAL at 10:06

## 2021-06-24 RX ADMIN — RISPERIDONE 2 MG: 1 TABLET ORAL at 10:06

## 2021-06-25 PROBLEM — E44.0 MODERATE MALNUTRITION: Status: ACTIVE | Noted: 2021-06-25

## 2021-06-25 LAB
POCT GLUCOSE: 119 MG/DL (ref 70–110)
POCT GLUCOSE: 183 MG/DL (ref 70–110)
POCT GLUCOSE: 189 MG/DL (ref 70–110)
POCT GLUCOSE: 203 MG/DL (ref 70–110)

## 2021-06-25 PROCEDURE — 25000003 PHARM REV CODE 250: Performed by: PSYCHIATRY & NEUROLOGY

## 2021-06-25 PROCEDURE — 99232 SBSQ HOSP IP/OBS MODERATE 35: CPT | Mod: ,,, | Performed by: HOSPITALIST

## 2021-06-25 PROCEDURE — C9399 UNCLASSIFIED DRUGS OR BIOLOG: HCPCS | Performed by: STUDENT IN AN ORGANIZED HEALTH CARE EDUCATION/TRAINING PROGRAM

## 2021-06-25 PROCEDURE — 20600001 HC STEP DOWN PRIVATE ROOM

## 2021-06-25 PROCEDURE — 25000003 PHARM REV CODE 250: Performed by: STUDENT IN AN ORGANIZED HEALTH CARE EDUCATION/TRAINING PROGRAM

## 2021-06-25 PROCEDURE — 99232 PR SUBSEQUENT HOSPITAL CARE,LEVL II: ICD-10-PCS | Mod: ,,, | Performed by: HOSPITALIST

## 2021-06-25 PROCEDURE — 25000003 PHARM REV CODE 250: Performed by: HOSPITALIST

## 2021-06-25 RX ORDER — PIOGLITAZONEHYDROCHLORIDE 15 MG/1
45 TABLET ORAL DAILY
Status: DISCONTINUED | OUTPATIENT
Start: 2021-06-26 | End: 2021-06-28 | Stop reason: HOSPADM

## 2021-06-25 RX ORDER — ONDANSETRON 4 MG/1
4 TABLET, FILM COATED ORAL EVERY 6 HOURS PRN
Status: DISCONTINUED | OUTPATIENT
Start: 2021-06-25 | End: 2021-06-28 | Stop reason: HOSPADM

## 2021-06-25 RX ORDER — ACETAMINOPHEN 325 MG/1
650 TABLET ORAL EVERY 6 HOURS PRN
Status: DISCONTINUED | OUTPATIENT
Start: 2021-06-25 | End: 2021-06-28 | Stop reason: HOSPADM

## 2021-06-25 RX ORDER — ATORVASTATIN CALCIUM 20 MG/1
20 TABLET, FILM COATED ORAL NIGHTLY
Status: DISCONTINUED | OUTPATIENT
Start: 2021-06-26 | End: 2021-06-28 | Stop reason: HOSPADM

## 2021-06-25 RX ORDER — CALCIUM CARBONATE 200(500)MG
500 TABLET,CHEWABLE ORAL 3 TIMES DAILY PRN
Status: DISCONTINUED | OUTPATIENT
Start: 2021-06-25 | End: 2021-06-28 | Stop reason: HOSPADM

## 2021-06-25 RX ORDER — METFORMIN HYDROCHLORIDE 500 MG/1
1000 TABLET ORAL 2 TIMES DAILY WITH MEALS
Status: DISCONTINUED | OUTPATIENT
Start: 2021-06-26 | End: 2021-06-28 | Stop reason: HOSPADM

## 2021-06-25 RX ORDER — BISACODYL 5 MG
5 TABLET, DELAYED RELEASE (ENTERIC COATED) ORAL DAILY PRN
Status: DISCONTINUED | OUTPATIENT
Start: 2021-06-25 | End: 2021-06-28 | Stop reason: HOSPADM

## 2021-06-25 RX ADMIN — RISPERIDONE 2 MG: 1 TABLET ORAL at 08:06

## 2021-06-25 RX ADMIN — OXCARBAZEPINE 300 MG: 150 TABLET, FILM COATED ORAL at 08:06

## 2021-06-25 RX ADMIN — PANTOPRAZOLE SODIUM 40 MG: 40 TABLET, DELAYED RELEASE ORAL at 05:06

## 2021-06-25 RX ADMIN — BENZTROPINE MESYLATE 1 MG: 0.5 TABLET ORAL at 10:06

## 2021-06-25 RX ADMIN — INSULIN ASPART 2 UNITS: 100 INJECTION, SOLUTION INTRAVENOUS; SUBCUTANEOUS at 05:06

## 2021-06-25 RX ADMIN — BENZTROPINE MESYLATE 1 MG: 0.5 TABLET ORAL at 08:06

## 2021-06-25 RX ADMIN — INSULIN DETEMIR 5 UNITS: 100 INJECTION, SOLUTION SUBCUTANEOUS at 08:06

## 2021-06-25 RX ADMIN — RISPERIDONE 2 MG: 1 TABLET ORAL at 10:06

## 2021-06-25 RX ADMIN — PANTOPRAZOLE SODIUM 40 MG: 40 TABLET, DELAYED RELEASE ORAL at 04:06

## 2021-06-25 RX ADMIN — OXCARBAZEPINE 300 MG: 150 TABLET, FILM COATED ORAL at 10:06

## 2021-06-26 LAB
POCT GLUCOSE: 115 MG/DL (ref 70–110)
POCT GLUCOSE: 142 MG/DL (ref 70–110)
POCT GLUCOSE: 178 MG/DL (ref 70–110)
POCT GLUCOSE: 228 MG/DL (ref 70–110)

## 2021-06-26 PROCEDURE — 20600001 HC STEP DOWN PRIVATE ROOM

## 2021-06-26 PROCEDURE — 25000003 PHARM REV CODE 250: Performed by: PSYCHIATRY & NEUROLOGY

## 2021-06-26 PROCEDURE — 99233 SBSQ HOSP IP/OBS HIGH 50: CPT | Mod: 95,,, | Performed by: INTERNAL MEDICINE

## 2021-06-26 PROCEDURE — 25000003 PHARM REV CODE 250: Performed by: INTERNAL MEDICINE

## 2021-06-26 PROCEDURE — 99233 PR SUBSEQUENT HOSPITAL CARE,LEVL III: ICD-10-PCS | Mod: 95,,, | Performed by: INTERNAL MEDICINE

## 2021-06-26 PROCEDURE — 25000003 PHARM REV CODE 250: Performed by: HOSPITALIST

## 2021-06-26 RX ORDER — BENZONATATE 100 MG/1
100 CAPSULE ORAL 3 TIMES DAILY PRN
Status: DISCONTINUED | OUTPATIENT
Start: 2021-06-26 | End: 2021-06-28 | Stop reason: HOSPADM

## 2021-06-26 RX ADMIN — PIOGLITAZONE 45 MG: 15 TABLET ORAL at 09:06

## 2021-06-26 RX ADMIN — METFORMIN HYDROCHLORIDE 1000 MG: 500 TABLET ORAL at 08:06

## 2021-06-26 RX ADMIN — GUAIFENESIN AND DEXTROMETHORPHAN HYDROBROMIDE 2 TABLET: 600; 30 TABLET, EXTENDED RELEASE ORAL at 02:06

## 2021-06-26 RX ADMIN — RISPERIDONE 2 MG: 1 TABLET ORAL at 08:06

## 2021-06-26 RX ADMIN — GUAIFENESIN AND DEXTROMETHORPHAN HYDROBROMIDE 2 TABLET: 600; 30 TABLET, EXTENDED RELEASE ORAL at 08:06

## 2021-06-26 RX ADMIN — INSULIN ASPART 2 UNITS: 100 INJECTION, SOLUTION INTRAVENOUS; SUBCUTANEOUS at 08:06

## 2021-06-26 RX ADMIN — OXCARBAZEPINE 300 MG: 150 TABLET, FILM COATED ORAL at 08:06

## 2021-06-26 RX ADMIN — BENZTROPINE MESYLATE 1 MG: 0.5 TABLET ORAL at 08:06

## 2021-06-26 RX ADMIN — METFORMIN HYDROCHLORIDE 1000 MG: 500 TABLET ORAL at 04:06

## 2021-06-26 RX ADMIN — PANTOPRAZOLE SODIUM 40 MG: 40 TABLET, DELAYED RELEASE ORAL at 06:06

## 2021-06-26 RX ADMIN — PANTOPRAZOLE SODIUM 40 MG: 40 TABLET, DELAYED RELEASE ORAL at 04:06

## 2021-06-26 RX ADMIN — SITAGLIPTIN 100 MG: 50 TABLET, FILM COATED ORAL at 08:06

## 2021-06-26 RX ADMIN — ATORVASTATIN CALCIUM 20 MG: 20 TABLET, FILM COATED ORAL at 08:06

## 2021-06-27 PROBLEM — Z86.16 HISTORY OF 2019 NOVEL CORONAVIRUS DISEASE (COVID-19): Status: ACTIVE | Noted: 2021-06-27

## 2021-06-27 LAB
POCT GLUCOSE: 117 MG/DL (ref 70–110)
POCT GLUCOSE: 164 MG/DL (ref 70–110)
POCT GLUCOSE: 171 MG/DL (ref 70–110)
POCT GLUCOSE: 221 MG/DL (ref 70–110)

## 2021-06-27 PROCEDURE — 25000003 PHARM REV CODE 250: Performed by: PSYCHIATRY & NEUROLOGY

## 2021-06-27 PROCEDURE — 25000003 PHARM REV CODE 250: Performed by: HOSPITALIST

## 2021-06-27 PROCEDURE — 99233 PR SUBSEQUENT HOSPITAL CARE,LEVL III: ICD-10-PCS | Mod: 95,,, | Performed by: INTERNAL MEDICINE

## 2021-06-27 PROCEDURE — 20600001 HC STEP DOWN PRIVATE ROOM

## 2021-06-27 PROCEDURE — 99233 SBSQ HOSP IP/OBS HIGH 50: CPT | Mod: 95,,, | Performed by: INTERNAL MEDICINE

## 2021-06-27 PROCEDURE — 25000003 PHARM REV CODE 250: Performed by: INTERNAL MEDICINE

## 2021-06-27 RX ORDER — CHOLECALCIFEROL (VITAMIN D3) 25 MCG
1000 TABLET ORAL DAILY
Status: DISCONTINUED | OUTPATIENT
Start: 2021-06-28 | End: 2021-06-28 | Stop reason: HOSPADM

## 2021-06-27 RX ADMIN — BENZTROPINE MESYLATE 1 MG: 0.5 TABLET ORAL at 08:06

## 2021-06-27 RX ADMIN — INSULIN ASPART 2 UNITS: 100 INJECTION, SOLUTION INTRAVENOUS; SUBCUTANEOUS at 08:06

## 2021-06-27 RX ADMIN — RISPERIDONE 2 MG: 1 TABLET ORAL at 08:06

## 2021-06-27 RX ADMIN — METFORMIN HYDROCHLORIDE 1000 MG: 500 TABLET ORAL at 04:06

## 2021-06-27 RX ADMIN — METFORMIN HYDROCHLORIDE 1000 MG: 500 TABLET ORAL at 08:06

## 2021-06-27 RX ADMIN — OXCARBAZEPINE 300 MG: 150 TABLET, FILM COATED ORAL at 08:06

## 2021-06-27 RX ADMIN — PIOGLITAZONE 45 MG: 15 TABLET ORAL at 10:06

## 2021-06-27 RX ADMIN — ATORVASTATIN CALCIUM 20 MG: 20 TABLET, FILM COATED ORAL at 08:06

## 2021-06-27 RX ADMIN — SITAGLIPTIN 100 MG: 50 TABLET, FILM COATED ORAL at 08:06

## 2021-06-27 RX ADMIN — GUAIFENESIN AND DEXTROMETHORPHAN HYDROBROMIDE 2 TABLET: 600; 30 TABLET, EXTENDED RELEASE ORAL at 08:06

## 2021-06-27 RX ADMIN — PANTOPRAZOLE SODIUM 40 MG: 40 TABLET, DELAYED RELEASE ORAL at 04:06

## 2021-06-27 RX ADMIN — PANTOPRAZOLE SODIUM 40 MG: 40 TABLET, DELAYED RELEASE ORAL at 05:06

## 2021-06-27 RX ADMIN — ACETAMINOPHEN 650 MG: 325 TABLET ORAL at 08:06

## 2021-06-28 VITALS
BODY MASS INDEX: 28.72 KG/M2 | OXYGEN SATURATION: 96 % | TEMPERATURE: 98 F | WEIGHT: 200.63 LBS | HEIGHT: 70 IN | SYSTOLIC BLOOD PRESSURE: 125 MMHG | RESPIRATION RATE: 16 BRPM | HEART RATE: 88 BPM | DIASTOLIC BLOOD PRESSURE: 56 MMHG

## 2021-06-28 LAB
ALBUMIN SERPL BCP-MCNC: 3.1 G/DL (ref 3.5–5.2)
ANION GAP SERPL CALC-SCNC: 11 MMOL/L (ref 8–16)
BASOPHILS # BLD AUTO: 0.01 K/UL (ref 0–0.2)
BASOPHILS NFR BLD: 0.2 % (ref 0–1.9)
BUN SERPL-MCNC: 9 MG/DL (ref 8–23)
CALCIUM SERPL-MCNC: 8.1 MG/DL (ref 8.7–10.5)
CHLORIDE SERPL-SCNC: 106 MMOL/L (ref 95–110)
CO2 SERPL-SCNC: 19 MMOL/L (ref 23–29)
CREAT SERPL-MCNC: 1.2 MG/DL (ref 0.5–1.4)
DIFFERENTIAL METHOD: ABNORMAL
EOSINOPHIL # BLD AUTO: 0 K/UL (ref 0–0.5)
EOSINOPHIL NFR BLD: 0.7 % (ref 0–8)
ERYTHROCYTE [DISTWIDTH] IN BLOOD BY AUTOMATED COUNT: 12.8 % (ref 11.5–14.5)
EST. GFR  (AFRICAN AMERICAN): >60 ML/MIN/1.73 M^2
EST. GFR  (NON AFRICAN AMERICAN): >60 ML/MIN/1.73 M^2
FINAL PATHOLOGIC DIAGNOSIS: NORMAL
GLUCOSE SERPL-MCNC: 126 MG/DL (ref 70–110)
GROSS: NORMAL
HCT VFR BLD AUTO: 26.6 % (ref 40–54)
HGB BLD-MCNC: 8.2 G/DL (ref 14–18)
IMM GRANULOCYTES # BLD AUTO: 0.04 K/UL (ref 0–0.04)
IMM GRANULOCYTES NFR BLD AUTO: 0.7 % (ref 0–0.5)
LYMPHOCYTES # BLD AUTO: 0.7 K/UL (ref 1–4.8)
LYMPHOCYTES NFR BLD: 12.9 % (ref 18–48)
Lab: NORMAL
MAGNESIUM SERPL-MCNC: 1.2 MG/DL (ref 1.6–2.6)
MCH RBC QN AUTO: 30.9 PG (ref 27–31)
MCHC RBC AUTO-ENTMCNC: 30.8 G/DL (ref 32–36)
MCV RBC AUTO: 100 FL (ref 82–98)
MONOCYTES # BLD AUTO: 0.5 K/UL (ref 0.3–1)
MONOCYTES NFR BLD: 8.3 % (ref 4–15)
NEUTROPHILS # BLD AUTO: 4.4 K/UL (ref 1.8–7.7)
NEUTROPHILS NFR BLD: 77.2 % (ref 38–73)
NRBC BLD-RTO: 0 /100 WBC
PHOSPHATE SERPL-MCNC: 3.5 MG/DL (ref 2.7–4.5)
PLATELET # BLD AUTO: 243 K/UL (ref 150–450)
PMV BLD AUTO: 10.1 FL (ref 9.2–12.9)
POCT GLUCOSE: 135 MG/DL (ref 70–110)
POCT GLUCOSE: 183 MG/DL (ref 70–110)
POCT GLUCOSE: 196 MG/DL (ref 70–110)
POTASSIUM SERPL-SCNC: 5.2 MMOL/L (ref 3.5–5.1)
RBC # BLD AUTO: 2.65 M/UL (ref 4.6–6.2)
SODIUM SERPL-SCNC: 136 MMOL/L (ref 136–145)
SUPPLEMENTAL DIAGNOSIS: NORMAL
WBC # BLD AUTO: 5.66 K/UL (ref 3.9–12.7)

## 2021-06-28 PROCEDURE — 99239 HOSP IP/OBS DSCHRG MGMT >30: CPT | Mod: 95,,, | Performed by: INTERNAL MEDICINE

## 2021-06-28 PROCEDURE — 83735 ASSAY OF MAGNESIUM: CPT | Performed by: INTERNAL MEDICINE

## 2021-06-28 PROCEDURE — 86580 TB INTRADERMAL TEST: CPT | Performed by: INTERNAL MEDICINE

## 2021-06-28 PROCEDURE — 30200315 PPD INTRADERMAL TEST REV CODE 302: Performed by: INTERNAL MEDICINE

## 2021-06-28 PROCEDURE — 25000003 PHARM REV CODE 250: Performed by: INTERNAL MEDICINE

## 2021-06-28 PROCEDURE — 85025 COMPLETE CBC W/AUTO DIFF WBC: CPT | Performed by: INTERNAL MEDICINE

## 2021-06-28 PROCEDURE — 99239 PR HOSPITAL DISCHARGE DAY,>30 MIN: ICD-10-PCS | Mod: 95,,, | Performed by: INTERNAL MEDICINE

## 2021-06-28 PROCEDURE — 25000003 PHARM REV CODE 250: Performed by: HOSPITALIST

## 2021-06-28 PROCEDURE — 63600175 PHARM REV CODE 636 W HCPCS: Performed by: INTERNAL MEDICINE

## 2021-06-28 PROCEDURE — 25000003 PHARM REV CODE 250: Performed by: PSYCHIATRY & NEUROLOGY

## 2021-06-28 PROCEDURE — 36415 COLL VENOUS BLD VENIPUNCTURE: CPT | Performed by: INTERNAL MEDICINE

## 2021-06-28 PROCEDURE — 80069 RENAL FUNCTION PANEL: CPT | Performed by: INTERNAL MEDICINE

## 2021-06-28 RX ORDER — RISPERIDONE 2 MG/1
2 TABLET ORAL 2 TIMES DAILY
Start: 2021-06-28

## 2021-06-28 RX ORDER — LISINOPRIL 5 MG/1
5 TABLET ORAL DAILY
Start: 2021-06-28

## 2021-06-28 RX ORDER — MAGNESIUM SULFATE HEPTAHYDRATE 40 MG/ML
2 INJECTION, SOLUTION INTRAVENOUS
Status: COMPLETED | OUTPATIENT
Start: 2021-06-28 | End: 2021-06-28

## 2021-06-28 RX ORDER — OXCARBAZEPINE 300 MG/1
300 TABLET, FILM COATED ORAL 2 TIMES DAILY
Start: 2021-06-28

## 2021-06-28 RX ORDER — LANOLIN ALCOHOL/MO/W.PET/CERES
400 CREAM (GRAM) TOPICAL 2 TIMES DAILY
Status: DISCONTINUED | OUTPATIENT
Start: 2021-06-28 | End: 2021-06-28 | Stop reason: HOSPADM

## 2021-06-28 RX ORDER — LANOLIN ALCOHOL/MO/W.PET/CERES
400 CREAM (GRAM) TOPICAL DAILY
Refills: 0
Start: 2021-06-28

## 2021-06-28 RX ORDER — CHOLECALCIFEROL (VITAMIN D3) 25 MCG
1000 TABLET ORAL DAILY
Start: 2021-06-29

## 2021-06-28 RX ORDER — PANTOPRAZOLE SODIUM 40 MG/1
40 TABLET, DELAYED RELEASE ORAL 2 TIMES DAILY
Qty: 180 TABLET | Refills: 0
Start: 2021-06-28 | End: 2021-09-26

## 2021-06-28 RX ADMIN — Medication 400 MG: at 11:06

## 2021-06-28 RX ADMIN — PANTOPRAZOLE SODIUM 40 MG: 40 TABLET, DELAYED RELEASE ORAL at 04:06

## 2021-06-28 RX ADMIN — MAGNESIUM SULFATE HEPTAHYDRATE 2 G: 40 INJECTION, SOLUTION INTRAVENOUS at 11:06

## 2021-06-28 RX ADMIN — TUBERCULIN PURIFIED PROTEIN DERIVATIVE 5 UNITS: 5 INJECTION, SOLUTION INTRADERMAL at 03:06

## 2021-06-28 RX ADMIN — PIOGLITAZONE 45 MG: 15 TABLET ORAL at 09:06

## 2021-06-28 RX ADMIN — SITAGLIPTIN 100 MG: 50 TABLET, FILM COATED ORAL at 09:06

## 2021-06-28 RX ADMIN — THERA TABS 1 TABLET: TAB at 09:06

## 2021-06-28 RX ADMIN — PANTOPRAZOLE SODIUM 40 MG: 40 TABLET, DELAYED RELEASE ORAL at 06:06

## 2021-06-28 RX ADMIN — Medication 1000 UNITS: at 09:06

## 2021-06-28 RX ADMIN — GUAIFENESIN AND DEXTROMETHORPHAN HYDROBROMIDE 2 TABLET: 600; 30 TABLET, EXTENDED RELEASE ORAL at 09:06

## 2021-06-28 RX ADMIN — OXCARBAZEPINE 300 MG: 150 TABLET, FILM COATED ORAL at 09:06

## 2021-06-28 RX ADMIN — METFORMIN HYDROCHLORIDE 1000 MG: 500 TABLET ORAL at 04:06

## 2021-06-28 RX ADMIN — MAGNESIUM SULFATE HEPTAHYDRATE 2 G: 40 INJECTION, SOLUTION INTRAVENOUS at 02:06

## 2021-06-28 RX ADMIN — BENZTROPINE MESYLATE 1 MG: 0.5 TABLET ORAL at 09:06

## 2021-06-28 RX ADMIN — RISPERIDONE 2 MG: 1 TABLET ORAL at 09:06

## 2021-06-28 RX ADMIN — METFORMIN HYDROCHLORIDE 1000 MG: 500 TABLET ORAL at 09:06

## 2021-06-30 ENCOUNTER — TELEPHONE (OUTPATIENT)
Dept: ENDOSCOPY | Facility: HOSPITAL | Age: 61
End: 2021-06-30

## 2021-07-01 ENCOUNTER — PATIENT MESSAGE (OUTPATIENT)
Dept: ADMINISTRATIVE | Facility: OTHER | Age: 61
End: 2021-07-01

## 2021-07-01 DIAGNOSIS — A09 GASTROINTESTINAL INFECTION: Primary | ICD-10-CM

## 2021-07-02 ENCOUNTER — TELEPHONE (OUTPATIENT)
Dept: INFECTIOUS DISEASES | Facility: CLINIC | Age: 61
End: 2021-07-02

## 2021-07-03 DIAGNOSIS — B49 FUNGAL INFECTION: Primary | ICD-10-CM

## 2021-07-11 ENCOUNTER — PATIENT MESSAGE (OUTPATIENT)
Dept: GASTROENTEROLOGY | Facility: CLINIC | Age: 61
End: 2021-07-11

## 2021-07-11 DIAGNOSIS — B49 FUNGAL INFECTION: Primary | ICD-10-CM

## 2021-07-14 ENCOUNTER — PATIENT MESSAGE (OUTPATIENT)
Dept: GASTROENTEROLOGY | Facility: CLINIC | Age: 61
End: 2021-07-14

## 2021-07-14 ENCOUNTER — TELEPHONE (OUTPATIENT)
Dept: GASTROENTEROLOGY | Facility: CLINIC | Age: 61
End: 2021-07-14

## 2021-07-26 ENCOUNTER — PATIENT MESSAGE (OUTPATIENT)
Dept: INFECTIOUS DISEASES | Facility: CLINIC | Age: 61
End: 2021-07-26

## 2021-09-27 ENCOUNTER — TELEPHONE (OUTPATIENT)
Dept: ENDOSCOPY | Facility: HOSPITAL | Age: 61
End: 2021-09-27

## 2021-12-11 ENCOUNTER — TELEPHONE (OUTPATIENT)
Dept: ENDOSCOPY | Facility: HOSPITAL | Age: 61
End: 2021-12-11
Payer: MEDICARE

## 2021-12-18 DIAGNOSIS — Z12.11 ENCOUNTER FOR SCREENING COLONOSCOPY: ICD-10-CM

## 2021-12-18 DIAGNOSIS — K27.9 PEPTIC ULCER DISEASE: Primary | ICD-10-CM

## 2021-12-18 DIAGNOSIS — K22.10 EROSIVE ESOPHAGITIS: ICD-10-CM

## 2021-12-18 DIAGNOSIS — Z87.19 HISTORY OF GI BLEED: ICD-10-CM

## 2023-06-22 NOTE — PROGRESS NOTES
"   04/06/20 1030   [facility] Group Therapy   Group Name Leisure Education   Specific Interventions Skilled Activity Stress Management  (Saving for Stress Worksheet)   Participation Level Appropriate;Attentive;Sharing   Participation Quality Cooperative;Social   Insight/Motivation Applies New Skills;Good   Affect/Mood Display Appropriate   Cognition Alert   Psychomotor WNL   Patient identified 3 areas he want to improve in to decrease stress level "drinking less caffeine, speak openly about his feelings when he is angry or worried and budget his finances. Patient identified 3 things he likes about himself "my confidence, my self-esteem, my [employer] bible."  " actual